# Patient Record
Sex: FEMALE | Race: WHITE | NOT HISPANIC OR LATINO | Employment: OTHER | URBAN - METROPOLITAN AREA
[De-identification: names, ages, dates, MRNs, and addresses within clinical notes are randomized per-mention and may not be internally consistent; named-entity substitution may affect disease eponyms.]

---

## 2023-03-14 LAB
BASOPHILS (10*3/UL) IN BLOOD BY AUTOMATED COUNT: 0.04 X10E9/L (ref 0–0.1)
BASOPHILS/100 LEUKOCYTES IN BLOOD BY AUTOMATED COUNT: 0.7 % (ref 0–2)
EOSINOPHILS (10*3/UL) IN BLOOD BY AUTOMATED COUNT: 0.36 X10E9/L (ref 0–0.7)
EOSINOPHILS/100 LEUKOCYTES IN BLOOD BY AUTOMATED COUNT: 5.9 % (ref 0–6)
ERYTHROCYTE DISTRIBUTION WIDTH (RATIO) BY AUTOMATED COUNT: 13.2 % (ref 11.5–14.5)
ERYTHROCYTE MEAN CORPUSCULAR HEMOGLOBIN CONCENTRATION (G/DL) BY AUTOMATED: 30.3 G/DL (ref 32–36)
ERYTHROCYTE MEAN CORPUSCULAR VOLUME (FL) BY AUTOMATED COUNT: 94 FL (ref 80–100)
ERYTHROCYTES (10*6/UL) IN BLOOD BY AUTOMATED COUNT: 4.37 X10E12/L (ref 4–5.2)
HEMATOCRIT (%) IN BLOOD BY AUTOMATED COUNT: 40.9 % (ref 36–46)
HEMOGLOBIN (G/DL) IN BLOOD: 12.4 G/DL (ref 12–16)
IGA (MG/DL) IN SER/PLAS: 66 MG/DL (ref 70–400)
IGG (MG/DL) IN SER/PLAS: 576 MG/DL (ref 700–1600)
IGM (MG/DL) IN SER/PLAS: <10 MG/DL (ref 40–230)
IMMATURE GRANULOCYTES/100 LEUKOCYTES IN BLOOD BY AUTOMATED COUNT: 0.2 % (ref 0–0.9)
LEUKOCYTES (10*3/UL) IN BLOOD BY AUTOMATED COUNT: 6.1 X10E9/L (ref 4.4–11.3)
LYMPHOCYTES (10*3/UL) IN BLOOD BY AUTOMATED COUNT: 1.56 X10E9/L (ref 1.2–4.8)
LYMPHOCYTES/100 LEUKOCYTES IN BLOOD BY AUTOMATED COUNT: 25.7 % (ref 13–44)
MONOCYTES (10*3/UL) IN BLOOD BY AUTOMATED COUNT: 0.7 X10E9/L (ref 0.1–1)
MONOCYTES/100 LEUKOCYTES IN BLOOD BY AUTOMATED COUNT: 11.5 % (ref 2–10)
NEUTROPHILS (10*3/UL) IN BLOOD BY AUTOMATED COUNT: 3.41 X10E9/L (ref 1.2–7.7)
NEUTROPHILS/100 LEUKOCYTES IN BLOOD BY AUTOMATED COUNT: 56 % (ref 40–80)
NRBC (PER 100 WBCS) BY AUTOMATED COUNT: 0 /100 WBC (ref 0–0)
PLATELETS (10*3/UL) IN BLOOD AUTOMATED COUNT: 371 X10E9/L (ref 150–450)

## 2023-03-15 LAB
CD19%: ABNORMAL % (ref 6–19)
CD45%: 100 %
FMETH: ABNORMAL
FSIT1: ABNORMAL
MARKER INTERPRETATION: ABNORMAL
PV191: NORMAL

## 2023-04-06 LAB
ALANINE AMINOTRANSFERASE (SGPT) (U/L) IN SER/PLAS: 9 U/L (ref 7–45)
ALBUMIN (G/DL) IN SER/PLAS: 3.9 G/DL (ref 3.4–5)
ALKALINE PHOSPHATASE (U/L) IN SER/PLAS: 51 U/L (ref 33–136)
ANION GAP IN SER/PLAS: 13 MMOL/L (ref 10–20)
ASPARTATE AMINOTRANSFERASE (SGOT) (U/L) IN SER/PLAS: 12 U/L (ref 9–39)
BILIRUBIN TOTAL (MG/DL) IN SER/PLAS: 0.5 MG/DL (ref 0–1.2)
CALCIUM (MG/DL) IN SER/PLAS: 9.9 MG/DL (ref 8.6–10.3)
CARBON DIOXIDE, TOTAL (MMOL/L) IN SER/PLAS: 27 MMOL/L (ref 21–32)
CHLORIDE (MMOL/L) IN SER/PLAS: 103 MMOL/L (ref 98–107)
CREATININE (MG/DL) IN SER/PLAS: 0.61 MG/DL (ref 0.5–1.05)
GFR FEMALE: >90 ML/MIN/1.73M2
GLUCOSE (MG/DL) IN SER/PLAS: 105 MG/DL (ref 74–99)
POTASSIUM (MMOL/L) IN SER/PLAS: 4.4 MMOL/L (ref 3.5–5.3)
PROTEIN TOTAL: 6.9 G/DL (ref 6.4–8.2)
SODIUM (MMOL/L) IN SER/PLAS: 139 MMOL/L (ref 136–145)
THYROTROPIN (MIU/L) IN SER/PLAS BY DETECTION LIMIT <= 0.05 MIU/L: 0.11 MIU/L (ref 0.44–3.98)
THYROXINE (T4) FREE (NG/DL) IN SER/PLAS: 1.75 NG/DL (ref 0.61–1.12)
UREA NITROGEN (MG/DL) IN SER/PLAS: 16 MG/DL (ref 6–23)

## 2023-06-19 LAB
ACTIVATED PARTIAL THROMBOPLASTIN TIME IN PPP BY COAGULATION ASSAY: 35 SEC (ref 26–39)
ANION GAP IN SER/PLAS: 14 MMOL/L (ref 10–20)
CALCIUM (MG/DL) IN SER/PLAS: 9.7 MG/DL (ref 8.6–10.3)
CARBON DIOXIDE, TOTAL (MMOL/L) IN SER/PLAS: 25 MMOL/L (ref 21–32)
CHLORIDE (MMOL/L) IN SER/PLAS: 103 MMOL/L (ref 98–107)
CREATININE (MG/DL) IN SER/PLAS: 0.64 MG/DL (ref 0.5–1.05)
ERYTHROCYTE DISTRIBUTION WIDTH (RATIO) BY AUTOMATED COUNT: 13.2 % (ref 11.5–14.5)
ERYTHROCYTE MEAN CORPUSCULAR HEMOGLOBIN CONCENTRATION (G/DL) BY AUTOMATED: 33 G/DL (ref 32–36)
ERYTHROCYTE MEAN CORPUSCULAR VOLUME (FL) BY AUTOMATED COUNT: 88 FL (ref 80–100)
ERYTHROCYTES (10*6/UL) IN BLOOD BY AUTOMATED COUNT: 4.83 X10E12/L (ref 4–5.2)
GFR FEMALE: >90 ML/MIN/1.73M2
GLUCOSE (MG/DL) IN SER/PLAS: 98 MG/DL (ref 74–99)
HEMATOCRIT (%) IN BLOOD BY AUTOMATED COUNT: 42.4 % (ref 36–46)
HEMOGLOBIN (G/DL) IN BLOOD: 14 G/DL (ref 12–16)
INR IN PPP BY COAGULATION ASSAY: 1.1 (ref 0.9–1.1)
LEUKOCYTES (10*3/UL) IN BLOOD BY AUTOMATED COUNT: 5.8 X10E9/L (ref 4.4–11.3)
PLATELETS (10*3/UL) IN BLOOD AUTOMATED COUNT: 289 X10E9/L (ref 150–450)
POTASSIUM (MMOL/L) IN SER/PLAS: 4.2 MMOL/L (ref 3.5–5.3)
PROTHROMBIN TIME (PT) IN PPP BY COAGULATION ASSAY: 12.2 SEC (ref 9.8–13.4)
SODIUM (MMOL/L) IN SER/PLAS: 138 MMOL/L (ref 136–145)
THYROTROPIN (MIU/L) IN SER/PLAS BY DETECTION LIMIT <= 0.05 MIU/L: 0.79 MIU/L (ref 0.44–3.98)
THYROXINE (T4) FREE (NG/DL) IN SER/PLAS: 1.36 NG/DL (ref 0.61–1.12)
UREA NITROGEN (MG/DL) IN SER/PLAS: 15 MG/DL (ref 6–23)

## 2023-06-20 LAB — URINE CULTURE: NORMAL

## 2023-06-28 ENCOUNTER — HOSPITAL ENCOUNTER (OUTPATIENT)
Dept: DATA CONVERSION | Facility: HOSPITAL | Age: 73
End: 2023-06-28
Attending: UROLOGY | Admitting: UROLOGY
Payer: MEDICARE

## 2023-06-28 DIAGNOSIS — Z79.82 LONG TERM (CURRENT) USE OF ASPIRIN: ICD-10-CM

## 2023-06-28 DIAGNOSIS — N32.9 BLADDER DISORDER, UNSPECIFIED: ICD-10-CM

## 2023-06-28 DIAGNOSIS — J45.909 UNSPECIFIED ASTHMA, UNCOMPLICATED (HHS-HCC): ICD-10-CM

## 2023-06-28 DIAGNOSIS — I10 ESSENTIAL (PRIMARY) HYPERTENSION: ICD-10-CM

## 2023-06-28 DIAGNOSIS — E03.9 HYPOTHYROIDISM, UNSPECIFIED: ICD-10-CM

## 2023-07-03 LAB
COMPLETE PATHOLOGY REPORT: NORMAL
CONVERTED CLINICAL DIAGNOSIS-HISTORY: NORMAL
CONVERTED FINAL DIAGNOSIS: NORMAL
CONVERTED FINAL REPORT PDF LINK TO COPY AND PASTE: NORMAL
CONVERTED GROSS DESCRIPTION: NORMAL

## 2023-08-19 PROBLEM — H04.123 BILATERAL DRY EYES: Status: ACTIVE | Noted: 2023-08-19

## 2023-08-19 PROBLEM — I10 BENIGN ESSENTIAL HYPERTENSION: Status: ACTIVE | Noted: 2023-08-19
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
## 2023-08-19 PROBLEM — H25.11 AGE-RELATED NUCLEAR CATARACT OF RIGHT EYE: Status: ACTIVE | Noted: 2023-08-19

## 2023-08-19 PROBLEM — R76.8 HEPATITIS B CORE ANTIBODY POSITIVE: Status: ACTIVE | Noted: 2023-08-19

## 2023-08-19 PROBLEM — B18.2 CHRONIC HEPATITIS C WITHOUT HEPATIC COMA (MULTI): Status: ACTIVE | Noted: 2023-08-19

## 2023-08-19 PROBLEM — H16.9 BILATERAL KERATITIS: Status: ACTIVE | Noted: 2023-08-19

## 2023-08-19 PROBLEM — G36.0 NEUROMYELITIS OPTICA (MULTI): Status: ACTIVE | Noted: 2023-08-19

## 2023-08-19 PROBLEM — M47.812 CERVICAL SPONDYLOSIS: Status: ACTIVE | Noted: 2023-08-19

## 2023-08-19 PROBLEM — Z86.010 HISTORY OF ADENOMATOUS POLYP OF COLON: Status: ACTIVE | Noted: 2023-08-19

## 2023-08-19 PROBLEM — Z86.0101 HISTORY OF ADENOMATOUS POLYP OF COLON: Status: ACTIVE | Noted: 2023-08-19

## 2023-08-19 PROBLEM — K63.5 COLON POLYP: Status: ACTIVE | Noted: 2023-08-19

## 2023-08-19 PROBLEM — H40.043 STEROID RESPONDER, BILATERAL: Status: ACTIVE | Noted: 2023-08-19

## 2023-08-19 PROBLEM — B02.9 SHINGLES: Status: ACTIVE | Noted: 2023-08-19

## 2023-08-19 PROBLEM — H53.411 CENTRAL SCOTOMA, RIGHT EYE: Status: ACTIVE | Noted: 2023-08-19

## 2023-08-19 PROBLEM — M54.9 PAIN, UPPER BACK: Status: ACTIVE | Noted: 2023-08-19

## 2023-08-19 PROBLEM — L30.9 DERMATITIS OF FACE: Status: ACTIVE | Noted: 2023-08-19

## 2023-08-19 PROBLEM — H53.19 PHOTOPSIA OF RIGHT EYE: Status: ACTIVE | Noted: 2023-08-19

## 2023-08-19 PROBLEM — H11.121 CONJUNCTIVAL CONCRETIONS OF RIGHT EYE: Status: ACTIVE | Noted: 2023-08-19

## 2023-08-19 PROBLEM — H40.059 ELEVATED IOP: Status: ACTIVE | Noted: 2023-08-19

## 2023-08-19 PROBLEM — H01.00B BLEPHARITIS OF BOTH UPPER AND LOWER EYELID OF LEFT EYE: Status: ACTIVE | Noted: 2023-08-19

## 2023-08-19 PROBLEM — E05.00 GRAVES DISEASE: Status: ACTIVE | Noted: 2023-08-19

## 2023-08-19 PROBLEM — H43.811 PVD (POSTERIOR VITREOUS DETACHMENT), RIGHT EYE: Status: ACTIVE | Noted: 2023-08-19

## 2023-08-19 PROBLEM — E03.9 HYPOTHYROIDISM: Status: ACTIVE | Noted: 2023-08-19

## 2023-08-19 PROBLEM — Z98.890 STATUS POST BILATERAL BREAST REDUCTION: Status: ACTIVE | Noted: 2023-08-19

## 2023-08-19 PROBLEM — Z86.19 HEPATITIS C VIRUS INFECTION CURED AFTER ANTIVIRAL DRUG THERAPY: Status: ACTIVE | Noted: 2023-08-19

## 2023-08-19 PROBLEM — M25.551 HIP PAIN, RIGHT: Status: ACTIVE | Noted: 2023-08-19

## 2023-08-19 PROBLEM — E55.9 VITAMIN D DEFICIENCY: Status: ACTIVE | Noted: 2023-08-19

## 2023-08-19 PROBLEM — Z79.620 ON RITUXIMAB THERAPY: Status: ACTIVE | Noted: 2023-08-19

## 2023-08-19 RX ORDER — ASPIRIN 325 MG
1 TABLET, DELAYED RELEASE (ENTERIC COATED) ORAL
COMMUNITY
End: 2024-04-22

## 2023-08-19 RX ORDER — NIRMATRELVIR AND RITONAVIR 300-100 MG
KIT ORAL
COMMUNITY
Start: 2022-12-26 | End: 2023-10-10 | Stop reason: ALTCHOICE

## 2023-08-19 RX ORDER — ARTIFICIAL TEARS 1; 2; 3 MG/ML; MG/ML; MG/ML
2 SOLUTION/ DROPS OPHTHALMIC EVERY 4 HOURS
COMMUNITY
Start: 2017-10-14

## 2023-08-19 RX ORDER — KETOCONAZOLE 20 MG/ML
SHAMPOO, SUSPENSION TOPICAL
COMMUNITY
Start: 2023-06-19

## 2023-08-19 RX ORDER — METHOCARBAMOL 500 MG/1
2 TABLET, FILM COATED ORAL 4 TIMES DAILY
COMMUNITY
End: 2023-10-10 | Stop reason: ALTCHOICE

## 2023-08-19 RX ORDER — LEVOTHYROXINE SODIUM 100 UG/1
TABLET ORAL
COMMUNITY
End: 2024-01-10

## 2023-08-19 RX ORDER — ONDANSETRON 4 MG/1
4 TABLET, FILM COATED ORAL DAILY
COMMUNITY
Start: 2023-03-02 | End: 2023-10-10 | Stop reason: ALTCHOICE

## 2023-08-19 RX ORDER — SULFAMETHOXAZOLE AND TRIMETHOPRIM 400; 80 MG/1; MG/1
1 TABLET ORAL EVERY OTHER DAY
COMMUNITY
Start: 2023-01-25 | End: 2023-10-10 | Stop reason: ALTCHOICE

## 2023-08-19 RX ORDER — CARBOXYMETHYLCELLULOSE SODIUM 10 MG/ML
2 GEL OPHTHALMIC 4 TIMES DAILY
COMMUNITY
Start: 2017-10-14 | End: 2024-01-12 | Stop reason: WASHOUT

## 2023-08-19 RX ORDER — ASPIRIN 325 MG
1 TABLET ORAL DAILY PRN
COMMUNITY

## 2023-08-19 RX ORDER — ENTECAVIR 0.5 MG/1
1 TABLET, FILM COATED ORAL
COMMUNITY
Start: 2017-12-20 | End: 2023-10-10 | Stop reason: SDUPTHER

## 2023-08-19 RX ORDER — FLUCONAZOLE 150 MG/1
1 TABLET ORAL DAILY
COMMUNITY
Start: 2023-02-02 | End: 2023-10-10 | Stop reason: ALTCHOICE

## 2023-08-19 RX ORDER — FLUTICASONE FUROATE, UMECLIDINIUM BROMIDE AND VILANTEROL TRIFENATATE 200; 62.5; 25 UG/1; UG/1; UG/1
1 POWDER RESPIRATORY (INHALATION) DAILY
COMMUNITY
Start: 2023-02-02 | End: 2023-10-10 | Stop reason: ALTCHOICE

## 2023-08-19 RX ORDER — BENZONATATE 100 MG/1
1 CAPSULE ORAL 3 TIMES DAILY
COMMUNITY
Start: 2023-03-02 | End: 2023-10-10 | Stop reason: ALTCHOICE

## 2023-08-19 RX ORDER — ALBUTEROL SULFATE 0.83 MG/ML
SOLUTION RESPIRATORY (INHALATION) 4 TIMES DAILY
COMMUNITY
Start: 2023-03-15 | End: 2024-02-09

## 2023-08-19 RX ORDER — VALSARTAN 40 MG/1
0.5 TABLET ORAL DAILY
COMMUNITY
End: 2023-11-03

## 2023-08-19 RX ORDER — PREDNISONE 10 MG/1
TABLET ORAL
COMMUNITY
Start: 2023-01-28 | End: 2023-10-10 | Stop reason: ALTCHOICE

## 2023-08-19 RX ORDER — ALBUTEROL SULFATE 90 UG/1
2 AEROSOL, METERED RESPIRATORY (INHALATION) EVERY 6 HOURS PRN
COMMUNITY

## 2023-09-13 DIAGNOSIS — Z79.899 DRUG-INDUCED IMMUNODEFICIENCY (MULTI): Primary | ICD-10-CM

## 2023-09-13 DIAGNOSIS — D84.821 DRUG-INDUCED IMMUNODEFICIENCY (MULTI): Primary | ICD-10-CM

## 2023-09-13 DIAGNOSIS — G36.0 NEUROMYELITIS OPTICA (MULTI): ICD-10-CM

## 2023-09-13 RX ORDER — DIPHENHYDRAMINE HYDROCHLORIDE 50 MG/ML
50 INJECTION INTRAMUSCULAR; INTRAVENOUS ONCE
Status: CANCELLED | OUTPATIENT
Start: 2023-10-06 | End: 2023-10-06

## 2023-09-13 RX ORDER — ACETAMINOPHEN 325 MG/1
650 TABLET ORAL ONCE
Status: CANCELLED | OUTPATIENT
Start: 2023-10-06 | End: 2023-10-06

## 2023-09-13 RX ORDER — DIPHENHYDRAMINE HYDROCHLORIDE 50 MG/ML
50 INJECTION INTRAMUSCULAR; INTRAVENOUS AS NEEDED
Status: CANCELLED | OUTPATIENT
Start: 2023-10-06

## 2023-09-13 RX ORDER — METHYLPREDNISOLONE SODIUM SUCCINATE 40 MG/ML
40 INJECTION INTRAMUSCULAR; INTRAVENOUS AS NEEDED
Status: CANCELLED | OUTPATIENT
Start: 2023-10-06

## 2023-09-13 RX ORDER — EPINEPHRINE 0.3 MG/.3ML
0.3 INJECTION SUBCUTANEOUS EVERY 5 MIN PRN
Status: CANCELLED | OUTPATIENT
Start: 2023-10-06

## 2023-09-13 RX ORDER — FAMOTIDINE 10 MG/ML
20 INJECTION INTRAVENOUS ONCE AS NEEDED
Status: CANCELLED | OUTPATIENT
Start: 2023-10-06

## 2023-09-13 RX ORDER — METHYLPREDNISOLONE SODIUM SUCCINATE 125 MG/2ML
100 INJECTION INTRAMUSCULAR; INTRAVENOUS ONCE
Status: CANCELLED | OUTPATIENT
Start: 2023-10-06 | End: 2023-10-06

## 2023-09-13 RX ORDER — ALBUTEROL SULFATE 0.83 MG/ML
3 SOLUTION RESPIRATORY (INHALATION) AS NEEDED
Status: CANCELLED | OUTPATIENT
Start: 2023-10-06

## 2023-09-13 NOTE — PROGRESS NOTES
Kristina Lundberg's disease modifying therapy (DMT) orders transcribed from [facility] into EPIC therapy plan for patient's upcoming dose.    Medication: Rituximab (Rituxan) 1000 mg IVPB once every 6 months    Prescriber: Reji Ryan MD    Infusion Location: [facility]    Last Infusion: 3/27/2023    Next Infusion Due:  9/24/2023    Next Infusion Scheduled: 10/6/2023    Insurance Status: Approved through 9/23/2024    Other: Patient had adverse side effects to Ruxience product. Rituxan product should be used.

## 2023-09-16 ENCOUNTER — HOSPITAL ENCOUNTER (OUTPATIENT)
Facility: HOSPITAL | Age: 73
Setting detail: OUTPATIENT SURGERY
End: 2023-09-16
Attending: UROLOGY | Admitting: UROLOGY
Payer: MEDICARE

## 2023-09-19 LAB
ALANINE AMINOTRANSFERASE (SGPT) (U/L) IN SER/PLAS: 10 U/L (ref 7–45)
ALBUMIN (G/DL) IN SER/PLAS: 4.5 G/DL (ref 3.4–5)
ALKALINE PHOSPHATASE (U/L) IN SER/PLAS: 78 U/L (ref 33–136)
ASPARTATE AMINOTRANSFERASE (SGOT) (U/L) IN SER/PLAS: 15 U/L (ref 9–39)
BASOPHILS (10*3/UL) IN BLOOD BY AUTOMATED COUNT: 0.04 X10E9/L (ref 0–0.1)
BASOPHILS/100 LEUKOCYTES IN BLOOD BY AUTOMATED COUNT: 0.8 % (ref 0–2)
BILIRUBIN DIRECT (MG/DL) IN SER/PLAS: 0.1 MG/DL (ref 0–0.3)
BILIRUBIN TOTAL (MG/DL) IN SER/PLAS: 0.4 MG/DL (ref 0–1.2)
EOSINOPHILS (10*3/UL) IN BLOOD BY AUTOMATED COUNT: 0.15 X10E9/L (ref 0–0.7)
EOSINOPHILS/100 LEUKOCYTES IN BLOOD BY AUTOMATED COUNT: 3 % (ref 0–6)
ERYTHROCYTE DISTRIBUTION WIDTH (RATIO) BY AUTOMATED COUNT: 14.2 % (ref 11.5–14.5)
ERYTHROCYTE MEAN CORPUSCULAR HEMOGLOBIN CONCENTRATION (G/DL) BY AUTOMATED: 32.8 G/DL (ref 32–36)
ERYTHROCYTE MEAN CORPUSCULAR VOLUME (FL) BY AUTOMATED COUNT: 92 FL (ref 80–100)
ERYTHROCYTES (10*6/UL) IN BLOOD BY AUTOMATED COUNT: 4.83 X10E12/L (ref 4–5.2)
HEMATOCRIT (%) IN BLOOD BY AUTOMATED COUNT: 44.2 % (ref 36–46)
HEMOGLOBIN (G/DL) IN BLOOD: 14.5 G/DL (ref 12–16)
IMMATURE GRANULOCYTES/100 LEUKOCYTES IN BLOOD BY AUTOMATED COUNT: 0.4 % (ref 0–0.9)
LEUKOCYTES (10*3/UL) IN BLOOD BY AUTOMATED COUNT: 5 X10E9/L (ref 4.4–11.3)
LYMPHOCYTES (10*3/UL) IN BLOOD BY AUTOMATED COUNT: 0.94 X10E9/L (ref 1.2–4.8)
LYMPHOCYTES/100 LEUKOCYTES IN BLOOD BY AUTOMATED COUNT: 18.8 % (ref 13–44)
MONOCYTES (10*3/UL) IN BLOOD BY AUTOMATED COUNT: 0.52 X10E9/L (ref 0.1–1)
MONOCYTES/100 LEUKOCYTES IN BLOOD BY AUTOMATED COUNT: 10.4 % (ref 2–10)
NEUTROPHILS (10*3/UL) IN BLOOD BY AUTOMATED COUNT: 3.32 X10E9/L (ref 1.2–7.7)
NEUTROPHILS/100 LEUKOCYTES IN BLOOD BY AUTOMATED COUNT: 66.6 % (ref 40–80)
PLATELETS (10*3/UL) IN BLOOD AUTOMATED COUNT: 252 X10E9/L (ref 150–450)
PROTEIN TOTAL: 7.1 G/DL (ref 6.4–8.2)
THYROTROPIN (MIU/L) IN SER/PLAS BY DETECTION LIMIT <= 0.05 MIU/L: 2.35 MIU/L (ref 0.44–3.98)
THYROXINE (T4) FREE (NG/DL) IN SER/PLAS: 0.98 NG/DL (ref 0.61–1.12)

## 2023-09-20 LAB
HEPATITIS B VIRUS SURFACE AB (MIU/ML) IN SERUM: 285.3 MIU/ML
HEPATITIS B VIRUS SURFACE AG PRESENCE IN SERUM: NONREACTIVE
IGA (MG/DL) IN SER/PLAS: 61 MG/DL (ref 70–400)
IGG (MG/DL) IN SER/PLAS: 732 MG/DL (ref 700–1600)
IGM (MG/DL) IN SER/PLAS: <10 MG/DL (ref 40–230)
PARATHYRIN INTACT (PG/ML) IN SER/PLAS: 50.2 PG/ML (ref 18.5–88)
TRIIODOTHYRONINE (T3) FREE (PG/ML) IN SER/PLAS: 2.9 PG/ML (ref 2.3–4.2)

## 2023-09-21 LAB
HBV DNA PCR COMMENT: NORMAL
HBV DNA QUANT PCR IU/ML: NOT DETECTED IU/ML
HBV DNA QUANT PCR LOG: NORMAL LOG IU/ML

## 2023-09-30 NOTE — H&P
History & Physical Reviewed:   I have reviewed the History and Physical dated:  08-Jun-2023   History and Physical reviewed and relevant findings noted. Patient examined to review pertinent physical  findings.: No significant changes   Home Medications Reviewed: no changes noted   Allergies Reviewed: no changes noted       ERAS (Enhanced Recovery After Surgery):  ·  ERAS Patient: no     Consent:   COVID-19 Consent:  ·  COVID-19 Risk Consent Surgeon has reviewed key risks related to the risk of todd COVID-19 and if they contract COVID-19 what the risks are.       Electronic Signatures:  Ward Wells)  (Signed 28-Jun-2023 11:47)   Authored: History & Physical Reviewed, ERAS, Consent,  Note Completion      Last Updated: 28-Jun-2023 11:47 by Ward Wells)

## 2023-10-02 ENCOUNTER — OFFICE VISIT (OUTPATIENT)
Dept: NEUROLOGY | Facility: HOSPITAL | Age: 73
End: 2023-10-02
Payer: MEDICARE

## 2023-10-02 ENCOUNTER — DOCUMENTATION (OUTPATIENT)
Dept: RESEARCH | Age: 73
End: 2023-10-02

## 2023-10-02 VITALS
BODY MASS INDEX: 31.89 KG/M2 | DIASTOLIC BLOOD PRESSURE: 81 MMHG | WEIGHT: 180 LBS | HEIGHT: 63 IN | SYSTOLIC BLOOD PRESSURE: 131 MMHG | RESPIRATION RATE: 18 BRPM | TEMPERATURE: 96.6 F | HEART RATE: 93 BPM

## 2023-10-02 DIAGNOSIS — G36.0 NEUROMYELITIS OPTICA (MULTI): Primary | ICD-10-CM

## 2023-10-02 DIAGNOSIS — Z79.899 DRUG-INDUCED IMMUNODEFICIENCY (MULTI): ICD-10-CM

## 2023-10-02 DIAGNOSIS — D84.821 DRUG-INDUCED IMMUNODEFICIENCY (MULTI): ICD-10-CM

## 2023-10-02 PROCEDURE — 1159F MED LIST DOCD IN RCRD: CPT | Performed by: PSYCHIATRY & NEUROLOGY

## 2023-10-02 PROCEDURE — 3079F DIAST BP 80-89 MM HG: CPT | Performed by: PSYCHIATRY & NEUROLOGY

## 2023-10-02 PROCEDURE — 99215 OFFICE O/P EST HI 40 MIN: CPT | Performed by: PSYCHIATRY & NEUROLOGY

## 2023-10-02 PROCEDURE — 1125F AMNT PAIN NOTED PAIN PRSNT: CPT | Performed by: PSYCHIATRY & NEUROLOGY

## 2023-10-02 PROCEDURE — 1036F TOBACCO NON-USER: CPT | Performed by: PSYCHIATRY & NEUROLOGY

## 2023-10-02 PROCEDURE — 3075F SYST BP GE 130 - 139MM HG: CPT | Performed by: PSYCHIATRY & NEUROLOGY

## 2023-10-02 RX ORDER — BUPROPION HYDROCHLORIDE 75 MG/1
75 TABLET ORAL DAILY
Qty: 30 TABLET | Refills: 11 | Status: SHIPPED | OUTPATIENT
Start: 2023-10-02 | End: 2024-10-01

## 2023-10-02 ASSESSMENT — PAIN SCALES - GENERAL: PAINLEVEL: 5

## 2023-10-02 NOTE — PATIENT INSTRUCTIONS
Your immunoglobulin level improved and is now in the normal range, which is great. This is likely because of the reduced rituximab dose. Luckily, your B cells remained fully depleted with that dose.     You only have mild reduction of lymphocyte count, which is not clinically significant.     Please continue the same dose of rituximab and vitamin D.     Please take wellubtrin 75 mg daily for fatigue.     I will refer you to dermatology for hives.     Follow up after six months.     Thank you very much for coming to see me today.     Reji Ryan MD

## 2023-10-02 NOTE — PROGRESS NOTES
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)   · Pneumonitis (486) (J18.9)   · COVID-19 (079.89) (U07.1)     Orders  COVID-19, Neuromyelitis optica, Pneumonitis    · Pulmonary Medicine Referral Evaluation and Treatment  Evaluate & Treat  Status: Hold For  - Scheduling  Requested for: 14Mar2023  Neuromyelitis optica    · B Cell Phenotyping, Extended; Status:Active; Requested for:14Mar2023;    · Complete Blood Count + Differential; Status:Active; Requested for:14Mar2023;    · Immunoglobulins (G,A,M); Status:Active; Requested for:14Mar2023;      Provider Impressions  Assessment:  This is a 67 year old right handed White female patient, Jahova's witness, with PMH significant for 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 improved partially with PLEX. The initial neurological exam was positive for central scotoma and red desaturation in the right eye. I have personally reviewed the MRIs which showed a longitudinally-extensive enhancing lesion in the posterior right optic nerve with chiasmal involvement without demyelinating lesions in the brain. cervical MRI showed a non-enhancing short segment demyelinating plaque at C5 unchanged from the initial spine scan from 2006. CSF showed increased lymphocytes, protein, and IGG index but negative OCBs. OCT/VEP consistent with right optic neuritis. She tested negative for Aqp4 (serum and CSF) and anti MOG antibodies. 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 After consulting with hepatology, she was started on rituximab in January 2018 along with treatment for Hep B and Hep C. She's doing well apart from some fluctuation of residual neurological symptoms.      11/14/2018: stable. some mild spasticity in the legs and new headaches. No relapses. tolerating  rituximab well.  06/05/2019: stable. mild fluctuation of residual symptoms. Right hip pain so we need to r/o avascular necrosis of the right hip.   12/04/2019: some nighttime headache and diffuse body pains. Will check ESR and CRP. no new relapses.   06/03/2020: Worsening fatigue and mood but declined wellbutrin, amantadine, and sleep study. Says she won't want to take the SARS-COV-2 vaccine when it becomes available. Will check Ig, ALC, and B cell phenotypes today.     11/19/2020: No new relapses. she was recently diagnosed with early breast cancer based on biopsy and is planed for lumpectomy with sentinel LN dissection on 12/2 followed by hormonal and radiotherapy. Her labs from June showed normal IGG level and ALC, and zero B cells. She continues to have morning occipital headaches that improve when she stands up and walks but not worsening than before and she has had those for years. I offered her a brain MRI to r/o brain or meningeal mets but she declined given stability and chronicity of her headache which is reasonable. The new diagnosis of breast cancer raises a question about the possibility of paraneoplastic NMOSD so I will send the serum autoimmune encephalopathy panel on her especially to look for anti-CV2. This also raises a question about the relation to (and safety of) rituximab. Studies of rituximab therapy in RA and MS showed no increased risk of breast or other cancers but MS studies with the related ocrelizumab (humanized rituximab) showed some increase in breast cancer cases in patients with PPMS. I will check with her breast oncologist regarding their view of rituximab. The patient feels strongly towards staying on rituximab and is more worried about getting an NMOSD attack if she is to come off of it. Of course, if her CV2 antibody comes back positive then we are clearly dealing with a paraneoplastic NMOSD picture and in that case cancer treatment may lead to remission and there will be no need  for long-term therapy with rituximab any more.      02/04/2021: No new relapses. she underwent partial mastectomy in December and is planned for radiation and hormonal therapy soon. Her ENS1 panel came back negative including negative CV2 antibody. Her ALC and IgG levels remain normal. Her breast oncologist had no objection to rituximab. she was wondering if she should proceed with radiation therapy and covid vaccination. I explained that she should proceed with radiation and hormonal therapy as recommended by the oncologist as these do not have any known implications on her NMOSD or rituximab therapy. I discussed COVID19 vaccine implications.      08/09/2021: After radiation for breast cancer, she started having multiple symptoms including blurring of vision in the right eye, right eye pain, increased headaches, dizziness (leading one time to a fall and inability to recover), and worsening body spasms and bladder symptoms. she is concerned that radiation might have triggered an NMOSD relapse. Her last rituximab dose was in January of 2021 and she competed her second dose of the pfizer covid vaccine exactly four weeks ago. She is scheduled for her next rituximab infusion this Friday. OCT shows stable RNFL thickness OD compared to the last value documented by Dr. Jaime (68 compared to 66). Unlikely to be having optic neuritis but will get a brain MRi to evaluate her dizzy episode given her hx of breast cancer and NMOSD. will check spike antibody.      02/07/2022: MRI brain was unremarkable. No new symptoms but has ongoing fatigue. still thinking about going on hormonal therapy for her breast cancer as recommended by her oncologist. She is worried about her IgG trending down althought it is still in the normal range but because she cannot take blood products including IVIG if she is to develop hypogammglobulinemia, she is wondering if we should consider half dose or extended interval between rituximab doses. all are  reasonable options for her but only if she develops recurrent infection or critical IgG levels. For now will keep the same regimen. she is interested in joining the new NMOSD registry. She received her third dose of the Pfizer COVID vaccine and wants to check the antibody level again to decide whether or not she'll take the booster (fourth dose for her).      09/13/2022: last blood work showed decrease of IGG to below the lower limit of normal range (680). she also had a prolonged shingles infection in the her left breast and back area. this happened in June four months after her rituximab dose requiring prolonged valtrex course. This has since cleared but she is now interested in decreasing rituximab dose, which is reasonable. reports new left lateral thigh numbness (likely meralgia paraesthetica). wants hilaryeld, which is also reasonable.      03/14/2023: she was admitted to the hospital in January with complicated COVID19 requiring oxygen therapy. she was found to have COVID19 PNA and was also found to have pneumonitis thought to be related to her radiation therapy or rituximab. she is much better now but is thought to have long COVID19 with persistent cough. Her repeat Ig level in February went down to 440 but her ALC was normal at 2.2. We held her last rituximab dose in February (originally planned to be half dose). she reports some worsening of pre-existing visual and sensory symptoms during all this especially when taking hot showers. will repeat Ig level and if above 500, will resume rituximab at half dose. If still low, we will have to consider IVIG replacement if she allows it from the Shinto standpoint. she enrolled in [employer].    10/2/2023: IgG levels improved to normal after decreasing rituximab dose without early B cell repletion. No more serious infections (only minor cold). Complains of fatigue and spontaneous hives. Had many other questions that I answered to the best of my ability. Will continue  reduced dose rituximab and will add wellbutrin for fatigue. Will also refer her to dermatology.         Plan:  - Acute relapse management: not indicated      - DMT: continue rituximab at half dose.      - Will check CBC diff, IgG,and B cell phenotypes with each infusion.      - Symptomatic management: wellbutrin to address fatigue and mood.      - Will refer her to dermatology for evaluation     - Vitamin D: Continue 53436 units a week.      - Smoking: not smoking currently      - PT/OT: no needs      - Instructions: keep an active life style and develop exercise routine as tolerated. Recommend a balanced healthy diet. Avoid excessive amounts of salt, carbs, fat, and red meat. Increase intake of fruits, vegetables, and white meat.      - Follow up: in 6 months    Reji Ryan MD       10/2/2023                      Chief Complaint  Double seronegative NMOSD.      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 Jaime right RNFL thinning   CSF: done in 10/2017: W 14, P 162, IGG index high, OCBs negative.   JCV: positive in 11/2017, index 3.35  VZV: positive in 11/2017  HEP panel: Hep C PCR positive, Hep B core antibody positive , Hep B S antibody positive, Hep B surface antigen negative (concurrent hep C and B treatment with rituximab).   NMO: negative in serum and CSF 10/2017 and again negative in 2018  MOG: negative 10/2017 and again negative in 2018        This is a 67 year old right handed White female patient, Jahova's witness, with PMH significant for left eye blindness 2/2 congenital cataract,  transverse myelitis (3/2006), untreated HepC, HTN, Grave's dx/hypothyroidism, and obesity, who presents for follow up regarding double seronegative NMOSD.      Interval hx:  IgG levels improved to normal after decreasing rituximab dose without early B cell repletion. No more serious infections (only minor cold). Complains of fatigue and spontaneous hives.      NMO symptom review:     weakness: yes in 2006, resolved   sensory changes: yes in 2006, persistent in the arms  incoordination: no  falling: no  gait change: yes in 2006, resolved   painful vision loss: yes right posterior optic neuritis in 10/2017. Also reports several episodes of pain and changed light perception in her congenitally blind left eye since 2006.   double vision: no  vertigo: not currently   facial/bulbar weakness: never   Lhermitte's: yes since 2006  bladder/bowel dysfunction: mild urinary urgency since 2006     spasticity: yes in the legs   tonic spasms: yes rare extensor spasms of the RLE  tremors: no  RLS: not asked   dystonia: yes paroxysmal in the right hand.   other movement disorders: no     heat sensitivity: yes severe  fatigue: yes and excessive daytime sleepiness.   depression: no  anxiety: yes  cognitive changes: no     DMT: rituximab since 1/2018  medication side effects: fatigue  last blood work: 4/30/2018     Vitamin D: taking 22930 units weekly   symptomatic meds: has baclofen for spasms but she rarely takes it.                  Review of Systems  All systems reviewed and are negative except as mentioned in the HPI.        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)   · Cervical spondylosis (721.0) (M47.812)   · Chronic hepatitis C without hepatic coma (070.54) (B18.2)   · Class 1 obesity with body mass index (BMI) of 31.0 to 31.9 in adult (278.00,V85.31)  (E66.9,Z68.31)   · Colon polyp (211.3) (K63.5)   · COVID-19 (079.89) (U07.1)   · Elevated IOP (365.00) (H40.059)   · Exposure to medical therapeutic radiation (E873.3) (Y63.3)   · Former smoker (V15.82) (Z87.891)   · Graves disease (242.00) (E05.00)   · Hepatitis B core antibody positive (795.79) (R76.8)   · Hepatitis C virus infection cured after antiviral drug therapy (V12.09) (Z86.19)   · Hip pain, right (719.45) (M25.551)   · History of adenomatous polyp of colon (V12.72) (Z86.010)   · Hypothyroidism (244.9) (E03.9)   · Low back pain (724.2) (M54.50)   · Malignant neoplasm of central portion of left breast in female, estrogen receptor positive  (174.1,V86.0) (C50.112,Z17.0)   · Nausea in adult (787.02) (R11.0)   · 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)   · On rituximab therapy (V58.69) (Z79.620)   · Optic neuritis, right (377.30) (H46.9)   · Other visual distortions and entoptic phenomena (368.15) (H53.19)   · Pain of left lower extremity (729.5) (M79.605)   · Pain, upper back (724.5) (M54.9)   · Photopsia of right eye (368.15) (H53.19)   · PVD (posterior vitreous detachment), right eye (379.21) (H43.811)   · Shingles (053.9) (B02.9)   · Status post bilateral breast reduction (V45.89) (Z98.890)   · Steroid responder, bilateral (365.03) (H40.043)   · Thyroid nodule (241.0) (E04.1)   · Transverse myelitis (323.82) (G37.3)   · Vitamin D deficiency (268.9) (E55.9)   · Vitreous degeneration of right eye (379.21) (H43.811)     Past Medical History  Problems    · History of Abdominal pain (789.00) (R10.9)   · History of Abdominal pain (789.00) (R10.9)   · History of Abdominal pain (789.00) (R10.9)   · History of Abnormal finding on breast imaging (793.89) (R92.8)   · Resolved Date:  21 Mar 2022   · History of Abnormal liver function test (790.6) (R79.89)   · History of Abnormal liver function test (790.6) (R79.89)   · Resolved Date: 21 Mar 2022   · Age-related nuclear cataract of right eye (366.16) (H25.11)   · History of Allergic conjunctivitis of both eyes (372.14) (H10.13)   · History of Cervical spondylosis (721.0) (M47.812)   · History of Cervical spondylosis (721.0) (M47.812)   · Resolved Date: 11 Nov 2021   · History of Chronic hepatitis C without hepatic coma (070.54) (B18.2)   · History of Chronic hepatitis C without hepatic coma (070.54) (B18.2)   · History of Chronic hepatitis C without hepatic coma (070.54) (B18.2)   · History of Chronic hepatitis C without hepatic coma (070.54) (B18.2)   · History of Congenital cataract (743.30) (Q12.0)   · Removed age 5 - Legally blind left eye - eye exam scheduled   · History of Eye pain (379.91) (H57.10)   · Resolved Date: 03 Jul 2014   · History of Hepatitis B core antibody positive (795.79) (R76.8)   · History of Hepatitis B core antibody positive (795.79) (R76.8)   · History of Hepatitis B core antibody positive (795.79) (R76.8)   · History of Hepatitis B core antibody positive (795.79) (R76.8)   · History of abdominal pain (V13.89) (Z87.898)   · Resolved Date: 11 Nov 2021   · History of acute conjunctivitis (V12.49) (Z86.69)   · History of acute conjunctivitis (V12.49) (Z86.69)   · Resolved Date: 11 Nov 2021   · History of cataract (V12.49) (Z86.69)   · Resolved Date: 10 Mar 2014   · h/o congenital cataract left eye, removed age 5. 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)   · Optic neuritis, right (377.30) (H46.9)   · Photopsia of right eye (368.15) (H53.19)   · History of Pupillary abnormality of left eye (364.75) (H21.562)   · History of Relative afferent pupillary defect of right eye (379.49) (H57.09)   · History of Rheumatoid arthritis (714.0) (M06.9)   · History of Transverse myelitis (323.82) (G37.3)   · Resolved Date: 20 Nov 2017   · 4/06 - (C3-4, C4-5)   · History of Vitamin D insufficiency (268.9) (E55.9)   · Resolved Date: 11 Nov 2021     Surgical History  Problems    · History of Ankle Arthrodesis Left   · Triple arthrodesis of the left ankle   · History of Bunion Correction By Garcia Procedure   · Bunionectomy of the left foot   · History of Cataract Surgery   · History of Complete Colonoscopy   · 2/07: 5yr f/u - Dr. Cody   · History of Dilation And Curettage   · History of Endometrial Biopsy By Suction   · 1/07 Dr. Haq   · History of Exploratory Laparoscopy   · History of Knee Replacement   · Left Knee replaced      Right knee replaced 2000   · History of Mastectomy partial   · History of Rotator Cuff Repair   · Right rotator cuff repair 6/11   · History of Total Knee Arthroplasty     Family History  Mother    · Family history of Breast Cancer (V16.3)  Father    · Family history of Acute Myocardial Infarction (V17.3)   · Family history of cardiac disorder (V17.49) (Z82.49)  Maternal Grandmother    · Family history of diabetes mellitus (V18.0) (Z83.3)   · Family history of malignant neoplasm (V16.9) (Z80.9)   · Family history of Type 2 diabetes mellitus with other circulatory complication     Social History  Problems    · Caffeine use (V49.89) (Z78.9)   · 2 cups daily   · Former smoker (V15.82) (Z87.891)   · No illicit drug use   · Spiritism Affiliation Latter day   · Social alcohol use (V49.89) (Z78.9)   ·  (V61.07) (Z63.4)     Allergies  Medication    · Motrin SUSP   Adverse Reaction; Lips and Face  swelling; Swelling; Recorded By: Manny Russell; 12/7/2012 8:11:46 AM   · ibuprofen   Recorded By: Avani Alaniz; 3/26/2021 2:16:50 PM   · Lisinopril TABS   Recorded By: Cate Fuller; 7/3/2014 2:54:07 PM   · Lyrica CAPS   Recorded By: Cate Fuller; 7/3/2014 2:54:07 PM  Denied    · Losartan Potassium TABS   Updated By: Cate Fuller; 8/28/2014 12:40:37 PM

## 2023-10-02 NOTE — OP NOTE
Post Operative Note:     PreOp Diagnosis: Bladder lesion   Post-Procedure Diagnosis: Same   Procedure: 1.  Cystoscopy with bladder biopsy/fulguration   Surgeon: Ward Wells MD   Resident/Fellow/Other Assistant: None   Anesthesia: General   Estimated Blood Loss (mL): Minimal   Specimen: yes   Findings: Small erythematous area posterior wall     Operative Report Dictated:  Dictation: not applicable - note contains Operative  Report   Operative Report:    Indications: 72-year-old white female presented to my partner for pelvic organ prolapse.  CT scan incidentally noted asymmetry of the bladder wall and there was concern  for a possible malignancy.  I recommended cystoscopy and possible biopsy.  Follow-up CT urogram did demonstrate resolution of this area of abnormality.  I still recommended cystoscopy for completion of her evaluation.  Risk, benefits, and alternatives  were discussed with the patient.  Informed consent was obtained.    Procedure: Patient is brought to the operating room and placed in the supine position. General anesthesia is induced. Once she is adequately anesthetized, her legs are placed in the dorsal lithotomy position. Her genitalia prepped and draped in the usual  sterile fashion.  A 22 Maori cystoscope was passed atraumatically per the urethra.  The bladder was inspected with both a 30 and 70 degree lens.  The right and left ureteral orifice were identified and were effluxing clear urine.  The bladder was unremarkable  with the exception of a small erythematous area along the posterior wall.  This area was biopsied with a rigid biopsy forcep.  It was then fulgurated.  The specimen was sent to pathology.  The bladder was reinspected and no other abnormalities were identified.   The bladder was drained and the cystoscope was removed.  Patient was awakened and taken to the PACU in stable condition.      Electronic Signatures:  Ward Wells)  (Signed 28-Jun-2023 13:28)   Authored:  Post Operative Note, Note Completion      Last Updated: 28-Jun-2023 13:28 by Ward Wells)

## 2023-10-05 ENCOUNTER — SPECIALTY PHARMACY (OUTPATIENT)
Dept: PHARMACY | Facility: CLINIC | Age: 73
End: 2023-10-05

## 2023-10-06 ENCOUNTER — INFUSION (OUTPATIENT)
Dept: HEMATOLOGY/ONCOLOGY | Facility: CLINIC | Age: 73
End: 2023-10-06
Payer: MEDICARE

## 2023-10-06 VITALS
WEIGHT: 179.45 LBS | BODY MASS INDEX: 31.79 KG/M2 | OXYGEN SATURATION: 94 % | TEMPERATURE: 97 F | SYSTOLIC BLOOD PRESSURE: 133 MMHG | HEART RATE: 89 BPM | RESPIRATION RATE: 18 BRPM | DIASTOLIC BLOOD PRESSURE: 64 MMHG

## 2023-10-06 DIAGNOSIS — D84.821 DRUG-INDUCED IMMUNODEFICIENCY (MULTI): ICD-10-CM

## 2023-10-06 DIAGNOSIS — Z79.899 DRUG-INDUCED IMMUNODEFICIENCY (MULTI): ICD-10-CM

## 2023-10-06 DIAGNOSIS — G36.0 NEUROMYELITIS OPTICA (MULTI): ICD-10-CM

## 2023-10-06 PROCEDURE — 2500000004 HC RX 250 GENERAL PHARMACY W/ HCPCS (ALT 636 FOR OP/ED): Mod: JZ | Performed by: PSYCHIATRY & NEUROLOGY

## 2023-10-06 PROCEDURE — 2500000001 HC RX 250 WO HCPCS SELF ADMINISTERED DRUGS (ALT 637 FOR MEDICARE OP): Performed by: PSYCHIATRY & NEUROLOGY

## 2023-10-06 PROCEDURE — 96402 CHEMO HORMON ANTINEOPL SQ/IM: CPT

## 2023-10-06 RX ORDER — EPINEPHRINE 0.3 MG/.3ML
0.3 INJECTION SUBCUTANEOUS EVERY 5 MIN PRN
Status: DISCONTINUED | OUTPATIENT
Start: 2023-10-06 | End: 2023-10-06 | Stop reason: HOSPADM

## 2023-10-06 RX ORDER — DIPHENHYDRAMINE HYDROCHLORIDE 50 MG/ML
50 INJECTION INTRAMUSCULAR; INTRAVENOUS ONCE
Status: COMPLETED | OUTPATIENT
Start: 2023-10-06 | End: 2023-10-06

## 2023-10-06 RX ORDER — DIPHENHYDRAMINE HYDROCHLORIDE 50 MG/ML
50 INJECTION INTRAMUSCULAR; INTRAVENOUS AS NEEDED
Status: DISCONTINUED | OUTPATIENT
Start: 2023-10-06 | End: 2023-10-06 | Stop reason: HOSPADM

## 2023-10-06 RX ORDER — FAMOTIDINE 10 MG/ML
20 INJECTION INTRAVENOUS ONCE AS NEEDED
Status: CANCELLED | OUTPATIENT
Start: 2024-04-03

## 2023-10-06 RX ORDER — EPINEPHRINE 0.3 MG/.3ML
0.3 INJECTION SUBCUTANEOUS EVERY 5 MIN PRN
Status: CANCELLED | OUTPATIENT
Start: 2024-04-03

## 2023-10-06 RX ORDER — ALBUTEROL SULFATE 0.83 MG/ML
3 SOLUTION RESPIRATORY (INHALATION) AS NEEDED
Status: CANCELLED | OUTPATIENT
Start: 2024-04-03

## 2023-10-06 RX ORDER — ACETAMINOPHEN 325 MG/1
650 TABLET ORAL ONCE
Status: CANCELLED | OUTPATIENT
Start: 2024-04-03 | End: 2024-04-03

## 2023-10-06 RX ORDER — ALBUTEROL SULFATE 0.83 MG/ML
3 SOLUTION RESPIRATORY (INHALATION) AS NEEDED
Status: DISCONTINUED | OUTPATIENT
Start: 2023-10-06 | End: 2023-10-06 | Stop reason: HOSPADM

## 2023-10-06 RX ORDER — DIPHENHYDRAMINE HYDROCHLORIDE 50 MG/ML
50 INJECTION INTRAMUSCULAR; INTRAVENOUS AS NEEDED
Status: CANCELLED | OUTPATIENT
Start: 2024-04-03

## 2023-10-06 RX ORDER — FAMOTIDINE 10 MG/ML
20 INJECTION INTRAVENOUS ONCE AS NEEDED
Status: DISCONTINUED | OUTPATIENT
Start: 2023-10-06 | End: 2023-10-06 | Stop reason: HOSPADM

## 2023-10-06 RX ORDER — ACETAMINOPHEN 325 MG/1
650 TABLET ORAL ONCE
Status: COMPLETED | OUTPATIENT
Start: 2023-10-06 | End: 2023-10-06

## 2023-10-06 RX ORDER — DIPHENHYDRAMINE HYDROCHLORIDE 50 MG/ML
50 INJECTION INTRAMUSCULAR; INTRAVENOUS ONCE
Status: CANCELLED | OUTPATIENT
Start: 2024-04-03 | End: 2024-04-03

## 2023-10-06 RX ADMIN — RITUXIMAB 1000 MG: 10 INJECTION, SOLUTION INTRAVENOUS at 10:44

## 2023-10-06 RX ADMIN — METHYLPREDNISOLONE SODIUM SUCCINATE 100 MG: 125 INJECTION, POWDER, FOR SOLUTION INTRAMUSCULAR; INTRAVENOUS at 10:13

## 2023-10-06 RX ADMIN — DIPHENHYDRAMINE HYDROCHLORIDE 50 MG: 50 INJECTION, SOLUTION INTRAMUSCULAR; INTRAVENOUS at 10:05

## 2023-10-06 RX ADMIN — ACETAMINOPHEN 650 MG: 325 TABLET, FILM COATED ORAL at 10:01

## 2023-10-06 ASSESSMENT — PATIENT HEALTH QUESTIONNAIRE - PHQ9
1. LITTLE INTEREST OR PLEASURE IN DOING THINGS: NOT AT ALL
1. LITTLE INTEREST OR PLEASURE IN DOING THINGS: NOT AT ALL
5. POOR APPETITE OR OVEREATING: 0
8. MOVING OR SPEAKING SO SLOWLY THAT OTHER PEOPLE COULD HAVE NOTICED. OR THE OPPOSITE, BEING SO FIGETY OR RESTLESS THAT YOU HAVE BEEN MOVING AROUND A LOT MORE THAN USUAL: NOT AT ALL
5. POOR APPETITE OR OVEREATING: NOT AT ALL
SUM OF ALL RESPONSES TO PHQ9 QUESTIONS 1 AND 2: 0
6. FEELING BAD ABOUT YOURSELF - OR THAT YOU ARE A FAILURE OR HAVE LET YOURSELF OR YOUR FAMILY DOWN: 0
2. FEELING DOWN, DEPRESSED, IRRITABLE, OR HOPELESS: 0
7. TROUBLE CONCENTRATING ON THINGS, SUCH AS READING THE NEWSPAPER OR WATCHING TELEVISION: 0
SUM OF ALL RESPONSES TO PHQ QUESTIONS 1-9: 0
2. FEELING DOWN, DEPRESSED, IRRITABLE, OR HOPELESS: NOT AT ALL
1. LITTLE INTEREST OR PLEASURE IN DOING THINGS: 0
10. IF YOU CHECKED OFF ANY PROBLEMS, HOW DIFFICULT HAVE THESE PROBLEMS MADE IT FOR YOU TO DO YOUR WORK, TAKE CARE OF THINGS AT HOME, OR GET ALONG WITH OTHER PEOPLE: NOT DIFFICULT AT ALL
8. MOVING OR SPEAKING SO SLOWLY THAT OTHER PEOPLE COULD HAVE NOTICED. OR THE OPPOSITE, BEING SO FIGETY OR RESTLESS THAT YOU HAVE BEEN MOVING AROUND A LOT MORE THAN USUAL: 0
2. FEELING DOWN, DEPRESSED OR HOPELESS: NOT AT ALL
3. TROUBLE FALLING OR STAYING ASLEEP OR SLEEPING TOO MUCH: NOT AT ALL
3. TROUBLE FALLING OR STAYING ASLEEP OR SLEEPING TOO MUCH: NOT AT ALL
10. IF YOU CHECKED OFF ANY PROBLEMS, HOW DIFFICULT HAVE THESE PROBLEMS MADE IT FOR YOU TO DO YOUR WORK, TAKE CARE OF THINGS AT HOME, OR GET ALONG WITH OTHER PEOPLE: NOT DIFFICULT AT ALL
4. FEELING TIRED OR HAVING LITTLE ENERGY: NOT AT ALL
8. MOVING OR SPEAKING SO SLOWLY THAT OTHER PEOPLE COULD HAVE NOTICED. OR THE OPPOSITE, BEING SO FIGETY OR RESTLESS THAT YOU HAVE BEEN MOVING AROUND A LOT MORE THAN USUAL: NOT AT ALL
9. THOUGHTS THAT YOU WOULD BE BETTER OFF DEAD, OR OF HURTING YOURSELF: NOT AT ALL
3. TROUBLE FALLING OR STAYING ASLEEP OR SLEEPING TOO MUCH: 0
9. THOUGHTS THAT YOU WOULD BE BETTER OFF DEAD, OR OF HURTING YOURSELF: NOT AT ALL
5. POOR APPETITE OR OVEREATING: NOT AT ALL
7. TROUBLE CONCENTRATING ON THINGS, SUCH AS READING THE NEWSPAPER OR WATCHING TELEVISION: NOT AT ALL
SUM OF ALL RESPONSES TO PHQ QUESTIONS 1-9: 0
2. FEELING DOWN, DEPRESSED OR HOPELESS: NOT AT ALL
9. THOUGHTS THAT YOU WOULD BE BETTER OFF DEAD, OR OF HURTING YOURSELF: 0
6. FEELING BAD ABOUT YOURSELF - OR THAT YOU ARE A FAILURE OR HAVE LET YOURSELF OR YOUR FAMILY DOWN: NOT AT ALL
4. FEELING TIRED OR HAVING LITTLE ENERGY: 0
1. LITTLE INTEREST OR PLEASURE IN DOING THINGS: NOT AT ALL
7. TROUBLE CONCENTRATING ON THINGS, SUCH AS READING THE NEWSPAPER OR WATCHING TELEVISION: NOT AT ALL
4. FEELING TIRED OR HAVING LITTLE ENERGY: NOT AT ALL
6. FEELING BAD ABOUT YOURSELF - OR THAT YOU ARE A FAILURE OR HAVE LET YOURSELF OR YOUR FAMILY DOWN: NOT AT ALL

## 2023-10-06 ASSESSMENT — PAIN SCALES - GENERAL: PAINLEVEL: 0-NO PAIN

## 2023-10-06 ASSESSMENT — COLUMBIA-SUICIDE SEVERITY RATING SCALE - C-SSRS
2. HAVE YOU ACTUALLY HAD ANY THOUGHTS OF KILLING YOURSELF?: NO
6. HAVE YOU EVER DONE ANYTHING, STARTED TO DO ANYTHING, OR PREPARED TO DO ANYTHING TO END YOUR LIFE?: NO

## 2023-10-06 NOTE — PROGRESS NOTES
1047 IV Rituxan hung & started at 17.5 ml/hr x30 min, datascope on, VSS, call light w/in reach, reviewed s/s reaction.  1120 IV Rituxan inf well w/out reaction noted, VSS, rate up to 35 ml/hr x30 min, pt dozing.  1150 IV Rituxan inf well w/out reaction noted, VSS, rate up to 52.5 ml/hr x30 min.  1220 IV Rituxan inf well w/out reaction noted, VSS, rate up to 70 ml/hr x30 min.  1250 IV Rituxan inf well w/out reaction noted, VSS, rate up to 87.5 ml/hr x30 min..  1320 IV Rituxan inf well w/out reaction noted, VSS, rate up to 105 ml/hr x30 min.  1350 IV Rituxan inf well w/out reaction noted, VSS, rate up to 122 ml/hr x30 min.  1420 IV Rituxan inf well w/out reaction noted, VSS, rate up to 140 ml/hr maximum rate.  1510 IV Rituxan infusion completed, NS flushing IV line, site clear, VSS.  1520 Pt disch amb, being picked up by her daughter.

## 2023-10-10 ENCOUNTER — OFFICE VISIT (OUTPATIENT)
Dept: DERMATOLOGY | Facility: CLINIC | Age: 73
End: 2023-10-10
Payer: MEDICARE

## 2023-10-10 DIAGNOSIS — L50.9 URTICARIA: Primary | ICD-10-CM

## 2023-10-10 PROCEDURE — 99213 OFFICE O/P EST LOW 20 MIN: CPT | Performed by: DERMATOLOGY

## 2023-10-10 PROCEDURE — 1036F TOBACCO NON-USER: CPT | Performed by: DERMATOLOGY

## 2023-10-10 PROCEDURE — 1126F AMNT PAIN NOTED NONE PRSNT: CPT | Performed by: DERMATOLOGY

## 2023-10-10 PROCEDURE — 1159F MED LIST DOCD IN RCRD: CPT | Performed by: DERMATOLOGY

## 2023-10-10 RX ORDER — TRIAMCINOLONE ACETONIDE 1 MG/G
CREAM TOPICAL
Qty: 454 G | Refills: 1 | Status: SHIPPED | OUTPATIENT
Start: 2023-10-10

## 2023-10-10 ASSESSMENT — DERMATOLOGY PATIENT ASSESSMENT
DO YOU HAVE ANY NEW OR CHANGING LESIONS: NO
ARE YOU AN ORGAN TRANSPLANT RECIPIENT: NO
FOR PATIENTS COMING IN FOR A FOLLOW-UP VISIT - HAVE THERE BEEN ANY CHANGES IN YOUR HEALTH SINCE YOUR LAST VISIT: NO
HAVE YOU HAD OR DO YOU HAVE A STAPH INFECTION: NO
DO YOU USE A TANNING BED: NO

## 2023-10-10 ASSESSMENT — DERMATOLOGY QUALITY OF LIFE (QOL) ASSESSMENT
ARE THERE EXCLUSIONS OR EXCEPTIONS FOR THE QUALITY OF LIFE ASSESSMENT: NO
RATE HOW BOTHERED YOU ARE BY EFFECTS OF YOUR SKIN PROBLEMS ON YOUR ACTIVITIES (EG, GOING OUT, ACCOMPLISHING WHAT YOU WANT, WORK ACTIVITIES OR YOUR RELATIONSHIPS WITH OTHERS): 2
RATE HOW BOTHERED YOU ARE BY SYMPTOMS OF YOUR SKIN PROBLEM (EG, ITCHING, STINGING BURNING, HURTING OR SKIN IRRITATION): 3
WHAT SINGLE SKIN CONDITION LISTED BELOW IS THE PATIENT ANSWERING THE QUALITY-OF-LIFE ASSESSMENT QUESTIONS ABOUT: URTICARIA
RATE HOW EMOTIONALLY BOTHERED YOU ARE BY YOUR SKIN PROBLEM (FOR EXAMPLE, WORRY, EMBARRASSMENT, FRUSTRATION): 2

## 2023-10-10 ASSESSMENT — PATIENT GLOBAL ASSESSMENT (PGA): PATIENT GLOBAL ASSESSMENT: PATIENT GLOBAL ASSESSMENT:  2 - MILD

## 2023-10-10 ASSESSMENT — ITCH NUMERIC RATING SCALE: HOW SEVERE IS YOUR ITCHING?: 10

## 2023-10-10 NOTE — PROGRESS NOTES
Subjective     Kristina Lundberg is a 72 y.o. female who presents for the following: Rash (Pt reports rash(hives) come and go, happens all over different areas of body. Happening for about 1 month. No specific time of day when flares. Has tried taking Zyrtec. Very itchy. Pt reports using new laundry soap. Used one time and then went back to old soap and rewashed laundry.//She is on Rituxan for Autoimmune neurologic disease - infusion was on Friday 10/6/23 and was given prophylactic meds of Benadryl and IV Solumedrol so still itching but not getting active areas currently.).     Review of Systems:  No other skin or systemic complaints other than what is documented elsewhere in the note.    The following portions of the chart were reviewed this encounter and updated as appropriate:          Skin Cancer History  No skin cancer on file.      Specialty Problems          Dermatology Problems    Dermatitis of face        Objective   Well appearing patient in no apparent distress; mood and affect are within normal limits.    A focused skin examination was performed- face, upper, lower extremities, breasts, buttocks. All findings within normal limits unless otherwise noted below.    Assessment/Plan   1. Urticaria  Edematous erythematous plaque with pallor    Hives are a histamine mediated process that can be acute (last < 6 weeks) and in some cases chronic (last > 6 weeks).    There are many causes of hives, however in chronic cases the most common cause is idiopathic.    Discussed treatment options including oral antihistamines, which unfortunately can may you feel tired or groggy. 2nd generation antihistamines such as Zyrtec or Claritin may be less likely to due so and can start taking either 10mg once daily.    Can also consider montelukast (Singulair).    For itching can start topical triamcinolone 0.1% cream and/or OTC Cerave Anti-itch creams or lotions. (Refridgerating creams can distract itch signal)    Triamcinolone  0.1% cream - Patient to apply 2x daily x 2 wks then decrease to 2x/day 2 days per week. Can repeat full 2 week course as often as every 6-8 weeks as needed for flaring. Side effects of topical steroids includes, but is not limited to skin atrophy and dyspigmentation.    Referral for allergy placed to consider prick or Nick testing, pt unsure if she would like to pursue.    No evidence of scabies to perform scabies prep today, reassured that despite hand itching there is no evidence of scabies or areas to due a mineral oil prep on today.      triamcinolone (Kenalog) 0.1 % cream  Apply to itchy areas (avoid face, skin folds) 2x daily for up to 2 weeks    Referral to Allergy        Follow up if worsening or for any other skin condition.

## 2023-10-12 ENCOUNTER — HOSPITAL ENCOUNTER (OUTPATIENT)
Dept: RADIOLOGY | Facility: HOSPITAL | Age: 73
Discharge: HOME | End: 2023-10-12
Payer: MEDICARE

## 2023-10-12 DIAGNOSIS — E04.1 NONTOXIC SINGLE THYROID NODULE: ICD-10-CM

## 2023-10-12 PROCEDURE — 76536 US EXAM OF HEAD AND NECK: CPT | Performed by: RADIOLOGY

## 2023-10-12 PROCEDURE — 76536 US EXAM OF HEAD AND NECK: CPT

## 2023-10-13 ENCOUNTER — TELEPHONE (OUTPATIENT)
Dept: PRIMARY CARE | Facility: CLINIC | Age: 73
End: 2023-10-13
Payer: MEDICARE

## 2023-10-13 NOTE — TELEPHONE ENCOUNTER
MD Faby Pierson MA  Ultrasound report reviewed.  1 nodule increased slightly, radiology recommending continued follow-up.  Recommend ultrasound 6 month    Informed pt

## 2023-10-17 NOTE — PROGRESS NOTES
Kristina Lundberg female   1950 72 y.o.   [id number]      Chief Complaint  Annual mammogram and exam, history of left DCIS    History Of Present Illness  Kristina Lundberg is a very pleasant 72 y.o.  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 2020 Megan Will and Manny Luis performed left breast bracketed Magseed partial mastectomy and bilateral oncoplastic reduction. Final pathology revealed 3.2 cm DCIS, margins widely negative. She completed whole breast post-operative radiation on 3/15/2021. She saw medical oncology for discussion of endocrine therapy, declined. She presents today for annual mammogram and exam. She denies any new masses or lumps. She does report intermittent left breast pain. She also reports she is considering a TH-BSO for prolapse.     BREAST IMAGING: 10/20/2022 Bilateral diagnostic mammogram, indicates BI-RADS Category 2.     REPRODUCTIVE HISTORY: menarche age 14 , first birth age 34,  x 3 years, OCP's x 8 months, natural menopause age 49, no HRT, scattered fibroglandular tissue    FAMILY CANCER HISTORY:   Mother: Breast cancer, age 43     Surgical History  She has a past surgical history that includes Dilation and curettage of uterus (2012); Laparoscopy diagnostic / biopsy / aspiration / lysis (2012); Total knee arthroplasty (2012); Other surgical history (2012); Other surgical history (2012); Rotator cuff repair (2012); Other surgical history (2012); Colonoscopy (2012); Total knee arthroplasty (2014); Cataract extraction (10/24/2017); IR CVC tunneled (10/25/2017); Breast lumpectomy (Left, 2020); Breast biopsy (Right, ); and Other surgical history (Bilateral, 2020).     Social History  She reports that she has never smoked. She has never used smokeless tobacco. She reports that she does not drink alcohol and does not use  drugs.    Family History  Family History   Problem Relation Name Age of Onset    Breast cancer Mother  43    Other (acute myocardial infarction) Father      Other (cardiac arrest) Father      Diabetes type II Maternal Grandfather      Other (malignant neoplasm) Maternal Grandfather          Allergies  Ibuprofen, Lisinopril, and Pregabalin    Medications  Current Outpatient Medications   Medication Instructions    albuterol 2.5 mg /3 mL (0.083 %) nebulizer solution nebulization, 4 times daily    albuterol 90 mcg/actuation inhaler 2 puffs, inhalation, Every 6 hours PRN    artificial tears, dextran-hypomel-glycerin, 0.1-0.3-0.2 % ophthalmic solution 2 drops, Left Eye, Every 4 hours    aspirin 325 mg tablet 1 tablet, oral, Daily PRN    buPROPion (WELLBUTRIN) 75 mg, oral, Daily    calcium carb/vitamin D3/vit K1 (VIACTIV ORAL) oral, Daily    carboxymethylcellulose (Refresh Celluvisc) 1 % ophthalmic solution dropperette 2 drops, Left Eye, 4 times daily    cholecalciferol (Vitamin D-3) 1,250 mcg (50,000 unit) capsule 1 capsule, oral, Weekly    entecavir (Baraclude) 0.5 mg tablet TAKE ONE (1) TABLET BY MOUTH DAILY 1 OR 2 HOURS BEFORE A MEAL.    ketoconazole (NIZOral) 2 % shampoo Topical, ply 1 Application daily topically. Lather on to scalp, let sit for 2-5 minutes. Rinse thoroughly. Use 2-3 times per week.<BR>    riTUXimab (RITUXAN) 1,000 mg, intravenous, Every 6 months    Synthroid 100 mcg tablet Take 1 Tablet for 6 Days A Week And 1 Day Off    triamcinolone (Kenalog) 0.1 % cream Apply to itchy areas (avoid face, skin folds) 2x daily for up to 2 weeks    valsartan (Diovan) 40 mg tablet oral, Daily         REVIEW OF SYSTEMS    Constitutional:  Negative for appetite change, fatigue, fever and unexpected weight change.   HENT:  Negative for ear pain, hearing loss, nosebleeds, sore throat and trouble swallowing.    Eyes:  Negative for discharge, itching and visual disturbance.   Respiratory:  Negative for cough, chest  tightness and shortness of breath.    Cardiovascular:  Negative for chest pain, palpitations and leg swelling.   Breast: as indicated in HPI  Gastrointestinal:  Negative for abdominal pain, constipation, diarrhea and nausea.   Endocrine: Negative for cold intolerance and heat intolerance.   Genitourinary:  Negative for dysuria, frequency, hematuria, pelvic pain and vaginal bleeding.   Musculoskeletal:  Negative for arthralgias, back pain, gait problem, joint swelling and myalgias.   Skin:  Negative for color change and rash.   Allergic/Immunologic: Negative for environmental allergies and food allergies.   Neurological:  Negative for dizziness, tremors, speech difficulty, weakness, numbness and headaches.   Hematological:  Does not bruise/bleed easily.   Psychiatric/Behavioral:  Negative for agitation, dysphoric mood and sleep disturbance. The patient is not nervous/anxious.         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), Ocular pain, unspecified eye (03/10/2014), Other abnormal and inconclusive findings on diagnostic imaging of breast (11/03/2020), Other anomalies of pupillary function (06/21/2019), Other microscopic hematuria, Other specified abnormal findings of blood chemistry, Other specified abnormal findings of blood chemistry (07/17/2018), Other specified abnormal immunological findings in serum (02/23/2018), Other specified abnormal immunological findings in serum (04/20/2018), Other  specified abnormal immunological findings in serum (05/25/2018), Other specified abnormal immunological findings in serum (07/17/2018), Other subjective visual disturbances (12/20/2022), Personal history of irradiation (2021), Personal history of other diseases of the circulatory system, Personal history of other diseases of the nervous system and sense organs (05/09/2018), Personal history of other diseases of the nervous system and sense organs (12/20/2022), Personal history of other diseases of the nervous system and sense organs (02/22/2013), Personal history of other endocrine, nutritional and metabolic disease, Personal history of other infectious and parasitic diseases (11/13/2018), Personal history of other specified conditions, Personal history of other specified conditions (12/04/2019), Pupillary abnormality, left eye (10/03/2017), Rheumatoid arthritis, unspecified (CMS/[facility]), Spondylosis without myelopathy or radiculopathy, cervical region, Spondylosis without myelopathy or radiculopathy, cervical region (07/03/2014), Unspecified abdominal pain (04/20/2018), Unspecified abdominal pain (05/25/2018), Unspecified abdominal pain (07/17/2018), Unspecified optic neuritis (12/20/2022), and Vitamin D deficiency, unspecified (07/17/2018).     Physical Exam  Patient is alert and oriented x3 and in a relaxed and appropriate mood. Her gait is steady and hand grasps are equal. Sclera is clear. The breasts are nearly symmetrical. Both breasts have well-healed pedicle method incisions. The left breast has thickened tissue inferior lateral quadrant and palpable scar tissue. The left breast has a tissue divot at midline of inframammary incision with mild hyperpigmentation. The remaining tissue of both breasts is soft without palpable abnormalities, discrete nodules or masses. The skin and nipples appear normal. There is no cervical, supraclavicular or axillary lymphadenopathy. Heart rate and rhythm normal, S1 and S2  appreciated. The lungs are clear to auscultation bilaterally. Abdomen is soft and non-tender.       Physical Exam  Chest:              Last Recorded Vitals  Vitals:    10/24/23 1517   BP: 133/68   Pulse: 85       Relevant Results   Time was spent discussing digital images of the radiology testing with the patient. I explained the results in depth, along with suggested explanation for follow up recommendations based on the testing results. BI-RADS Category 2    Imaging    Narrative & Impression   Interpreted By:  Ondina Yost,   STUDY:  BI MAMMO BILATERAL DIAGNOSTIC TOMOSYNTHESIS;  10/24/2023 3:15 pm      ACCESSION NUMBER(S):  [id number]      ORDERING CLINICIAN:  ALCON TAVARES      INDICATION:  History of a left lumpectomy with radiation therapy. History of a  bilateral breast reduction. Right excisional benign biopsy. Family  history of breast cancer with her mother diagnosed at age 43.      COMPARISON:  10/20/2022, 10/15/2021, 10/01/2020      FINDINGS:  2D and tomosynthesis images were reviewed at 1 mm slice thickness.      Density:  There are areas of scattered fibroglandular tissue.      In the central lateral aspect of the left breast there are surgical  clips and scarring at the lumpectomy site. The left lumpectomy site  is stable. Bilateral postreduction changes are present. The  parenchymal pattern is stable. No suspicious masses or calcifications  are identified.      IMPRESSION:  No mammographic evidence of malignancy.      BI-RADS CATEGORY:      BI-RADS Category:  2 Benign.  Recommendation:  Routine Screening Mammogram in 1 Year.  Recommended Date:  1 Year.  Laterality:  Bilateral.     Assessment/Plan   Normal clinical exam and imaging, history of left breast DCIS, family history of breast cancer, declined endocrine therapy, scattered fibroglandular tissue    Plan: Return in one year for bilateral screening mammogram and office visit. Provided GYN referrals per patient request.     Patient  Discussion/Summary  Your clinical examination and imaging are normal. Please return in one year for bilateral screening mammogram and office visit or sooner if you have any problems or concerns.     You can see your health information, review clinical summaries from office visits & test results online when you follow your health with MY  Chart, a personal health record. To sign up go to [email or web address]. If you need assistance with signing up or trouble getting into your account call [facility] Patient Line 24/7 at [phone].    My office phone number is [phone] if you need to get in touch with me or have additional questions or concerns. Thank you for choosing [facility] and trusting me as your healthcare provider. I look forward to seeing you again at your next office visit. I am honored to be a provider on your health care team and I remain dedicated to helping you achieve your health goals.      Sarah Beth Dennis, APRN-CNP

## 2023-10-24 ENCOUNTER — HOSPITAL ENCOUNTER (OUTPATIENT)
Dept: RADIOLOGY | Facility: HOSPITAL | Age: 73
Discharge: HOME | End: 2023-10-24
Payer: MEDICARE

## 2023-10-24 ENCOUNTER — SPECIALTY PHARMACY (OUTPATIENT)
Dept: PHARMACY | Facility: CLINIC | Age: 73
End: 2023-10-24

## 2023-10-24 ENCOUNTER — OFFICE VISIT (OUTPATIENT)
Dept: SURGICAL ONCOLOGY | Facility: HOSPITAL | Age: 73
End: 2023-10-24
Payer: MEDICARE

## 2023-10-24 VITALS
WEIGHT: 178 LBS | BODY MASS INDEX: 31.54 KG/M2 | HEART RATE: 85 BPM | DIASTOLIC BLOOD PRESSURE: 68 MMHG | SYSTOLIC BLOOD PRESSURE: 133 MMHG | HEIGHT: 63 IN

## 2023-10-24 VITALS — WEIGHT: 178 LBS | HEIGHT: 63 IN | BODY MASS INDEX: 31.54 KG/M2

## 2023-10-24 DIAGNOSIS — C50.112 MALIGNANT NEOPLASM OF CENTRAL PORTION OF LEFT FEMALE BREAST (MULTI): ICD-10-CM

## 2023-10-24 DIAGNOSIS — Z85.3 ENCOUNTER FOR FOLLOW-UP SURVEILLANCE OF BREAST CANCER: Primary | ICD-10-CM

## 2023-10-24 DIAGNOSIS — Z17.0 ESTROGEN RECEPTOR POSITIVE STATUS (ER+): ICD-10-CM

## 2023-10-24 DIAGNOSIS — Z08 ENCOUNTER FOR FOLLOW-UP SURVEILLANCE OF BREAST CANCER: Primary | ICD-10-CM

## 2023-10-24 PROCEDURE — G0279 TOMOSYNTHESIS, MAMMO: HCPCS | Mod: BILATERAL PROCEDURE | Performed by: RADIOLOGY

## 2023-10-24 PROCEDURE — 99213 OFFICE O/P EST LOW 20 MIN: CPT | Performed by: NURSE PRACTITIONER

## 2023-10-24 PROCEDURE — 1159F MED LIST DOCD IN RCRD: CPT | Performed by: NURSE PRACTITIONER

## 2023-10-24 PROCEDURE — 1036F TOBACCO NON-USER: CPT | Performed by: NURSE PRACTITIONER

## 2023-10-24 PROCEDURE — 77062 BREAST TOMOSYNTHESIS BI: CPT | Mod: 50

## 2023-10-24 PROCEDURE — 1126F AMNT PAIN NOTED NONE PRSNT: CPT | Performed by: NURSE PRACTITIONER

## 2023-10-24 PROCEDURE — 3078F DIAST BP <80 MM HG: CPT | Performed by: NURSE PRACTITIONER

## 2023-10-24 PROCEDURE — 3075F SYST BP GE 130 - 139MM HG: CPT | Performed by: NURSE PRACTITIONER

## 2023-10-24 PROCEDURE — 77066 DX MAMMO INCL CAD BI: CPT | Mod: BILATERAL PROCEDURE | Performed by: RADIOLOGY

## 2023-10-24 NOTE — PATIENT INSTRUCTIONS
Your clinical examination and imaging are normal. Please return in one year for bilateral screening mammogram and office visit or sooner if you have any problems or concerns.     You can see your health information, review clinical summaries from office visits & test results online when you follow your health with MY  Chart, a personal health record. To sign up go to [email or web address]. If you need assistance with signing up or trouble getting into your account call [facility] Patient Line 24/7 at [phone].    My office phone number is [phone] if you need to get in touch with me or have additional questions or concerns. Thank you for choosing [facility] and trusting me as your healthcare provider. I look forward to seeing you again at your next office visit. I am honored to be a provider on your health care team and I remain dedicated to helping you achieve your health goals.

## 2023-10-25 ENCOUNTER — PHARMACY VISIT (OUTPATIENT)
Dept: PHARMACY | Facility: CLINIC | Age: 73
End: 2023-10-25
Payer: COMMERCIAL

## 2023-10-25 ENCOUNTER — OFFICE VISIT (OUTPATIENT)
Dept: PRIMARY CARE | Facility: CLINIC | Age: 73
End: 2023-10-25
Payer: MEDICARE

## 2023-10-25 VITALS
HEIGHT: 63 IN | SYSTOLIC BLOOD PRESSURE: 104 MMHG | WEIGHT: 179.6 LBS | OXYGEN SATURATION: 96 % | HEART RATE: 84 BPM | DIASTOLIC BLOOD PRESSURE: 66 MMHG | BODY MASS INDEX: 31.82 KG/M2

## 2023-10-25 DIAGNOSIS — H10.32 ACUTE BACTERIAL CONJUNCTIVITIS OF LEFT EYE: Primary | ICD-10-CM

## 2023-10-25 PROCEDURE — 1126F AMNT PAIN NOTED NONE PRSNT: CPT | Performed by: STUDENT IN AN ORGANIZED HEALTH CARE EDUCATION/TRAINING PROGRAM

## 2023-10-25 PROCEDURE — 1159F MED LIST DOCD IN RCRD: CPT | Performed by: STUDENT IN AN ORGANIZED HEALTH CARE EDUCATION/TRAINING PROGRAM

## 2023-10-25 PROCEDURE — 99213 OFFICE O/P EST LOW 20 MIN: CPT | Performed by: STUDENT IN AN ORGANIZED HEALTH CARE EDUCATION/TRAINING PROGRAM

## 2023-10-25 PROCEDURE — 3074F SYST BP LT 130 MM HG: CPT | Performed by: STUDENT IN AN ORGANIZED HEALTH CARE EDUCATION/TRAINING PROGRAM

## 2023-10-25 PROCEDURE — 3078F DIAST BP <80 MM HG: CPT | Performed by: STUDENT IN AN ORGANIZED HEALTH CARE EDUCATION/TRAINING PROGRAM

## 2023-10-25 PROCEDURE — RXMED WILLOW AMBULATORY MEDICATION CHARGE

## 2023-10-25 PROCEDURE — 1036F TOBACCO NON-USER: CPT | Performed by: STUDENT IN AN ORGANIZED HEALTH CARE EDUCATION/TRAINING PROGRAM

## 2023-10-25 RX ORDER — ERYTHROMYCIN 5 MG/G
OINTMENT OPHTHALMIC 4 TIMES DAILY
Qty: 3.5 G | Refills: 0 | Status: SHIPPED | OUTPATIENT
Start: 2023-10-25 | End: 2023-10-26 | Stop reason: SDUPTHER

## 2023-10-25 RX ORDER — AMOXICILLIN AND CLAVULANATE POTASSIUM 875; 125 MG/1; MG/1
875 TABLET, FILM COATED ORAL 2 TIMES DAILY
Qty: 20 TABLET | Refills: 0 | Status: SHIPPED | OUTPATIENT
Start: 2023-10-25 | End: 2023-11-04

## 2023-10-25 ASSESSMENT — ENCOUNTER SYMPTOMS
EYE PAIN: 0
APPETITE CHANGE: 0
MYALGIAS: 0
COUGH: 0
EYE DISCHARGE: 1
HEMATURIA: 0
PALPITATIONS: 0
POLYDIPSIA: 0
FEVER: 0
EYE ITCHING: 1
SORE THROAT: 0
HEADACHES: 0
ABDOMINAL PAIN: 0
VOMITING: 0
FREQUENCY: 0
DIARRHEA: 0
SHORTNESS OF BREATH: 0
DYSURIA: 0
WHEEZING: 0
FATIGUE: 0
HALLUCINATIONS: 0
NAUSEA: 0
CONSTIPATION: 0

## 2023-10-25 NOTE — PROGRESS NOTES
"Subjective   Patient ID: Kristina Lundberg is a 72 y.o. female who presents for Facial Swelling and Eye Drainage.    HPI   Patient here for evaluation of bilateral eye burning and irritation.  Patient has complex eye history for which she sees neuro-ophthalmology.  Recently saw them however symptoms started shortly after.  Also has some associated right ear discomfort.  A lot of pressure in her right ear.  Was told in the past she has eustachian tube dysfunction.  Patient is legally blind out of her left eye.  Has not noticed any acuity changes from either eyes.  Noticed some mild swelling especially over the left eye.  Denies any direct trauma to the area. Afebrile course.  Has used refresh eyedrops as well as eye washes which does not seem to be helping her symptoms.    Review of Systems   Constitutional:  Negative for appetite change, fatigue and fever.   HENT:  Positive for ear pain. Negative for congestion, ear discharge, hearing loss and sore throat.    Eyes:  Positive for discharge and itching. Negative for pain.   Respiratory:  Negative for cough, shortness of breath and wheezing.    Cardiovascular:  Negative for chest pain, palpitations and leg swelling.   Gastrointestinal:  Negative for abdominal pain, constipation, diarrhea, nausea and vomiting.   Endocrine: Negative for cold intolerance, heat intolerance and polydipsia.   Genitourinary:  Negative for dysuria, frequency and hematuria.   Musculoskeletal:  Negative for gait problem and myalgias.   Skin:  Negative for rash.   Neurological:  Negative for syncope and headaches.   Psychiatric/Behavioral:  Negative for hallucinations and suicidal ideas.        Objective   /66   Pulse 84   Ht 1.6 m (5' 3\")   Wt 81.5 kg (179 lb 9.6 oz)   SpO2 96%   BMI 31.81 kg/m²     Physical Exam  Constitutional:       Appearance: Normal appearance.   HENT:      Head: Normocephalic and atraumatic.      Right Ear: External ear normal.      Left Ear: External ear normal. "      Ears:      Comments: Bulging right TM with erythematous effusion in the right ear.  Left TM within the acceptable limits     Nose: Nose normal.      Mouth/Throat:      Mouth: Mucous membranes are moist.   Eyes:      Comments: Conjunctive a slightly erythematous bilaterally.  Seems to be some edema on the upper and lower lids especially on the left eye.   Cardiovascular:      Rate and Rhythm: Normal rate and regular rhythm.      Pulses: Normal pulses.      Heart sounds: Normal heart sounds.   Pulmonary:      Effort: Pulmonary effort is normal.      Breath sounds: Normal breath sounds.   Abdominal:      General: Abdomen is flat.      Palpations: Abdomen is soft.   Neurological:      General: No focal deficit present.      Mental Status: She is alert and oriented to person, place, and time.   Psychiatric:         Mood and Affect: Mood normal.         Behavior: Behavior normal.         Assessment/Plan   Start the patient on topical and oral antibiotics.  If not better advised patient to please see her ophthalmologist for further evaluation.

## 2023-10-26 ENCOUNTER — TELEPHONE (OUTPATIENT)
Dept: PRIMARY CARE | Facility: CLINIC | Age: 73
End: 2023-10-26
Payer: MEDICARE

## 2023-10-26 DIAGNOSIS — H10.32 ACUTE BACTERIAL CONJUNCTIVITIS OF LEFT EYE: ICD-10-CM

## 2023-10-26 RX ORDER — ERYTHROMYCIN 5 MG/G
OINTMENT OPHTHALMIC 4 TIMES DAILY
Qty: 7 G | Refills: 0 | Status: SHIPPED | OUTPATIENT
Start: 2023-10-26 | End: 2023-11-02

## 2023-10-27 ENCOUNTER — APPOINTMENT (OUTPATIENT)
Dept: PREADMISSION TESTING | Facility: HOSPITAL | Age: 73
End: 2023-10-27
Payer: MEDICARE

## 2023-10-29 DIAGNOSIS — H10.30 ACUTE BACTERIAL CONJUNCTIVITIS, UNSPECIFIED LATERALITY: Primary | ICD-10-CM

## 2023-10-29 RX ORDER — CIPROFLOXACIN HYDROCHLORIDE 3 MG/ML
1 SOLUTION/ DROPS OPHTHALMIC
Qty: 6 ML | Refills: 0 | Status: SHIPPED | OUTPATIENT
Start: 2023-10-29 | End: 2023-11-15 | Stop reason: ALTCHOICE

## 2023-10-31 ENCOUNTER — TELEMEDICINE (OUTPATIENT)
Dept: UROLOGY | Facility: CLINIC | Age: 73
End: 2023-10-31
Payer: MEDICARE

## 2023-10-31 DIAGNOSIS — N81.4 UTEROVAGINAL PROLAPSE: Primary | ICD-10-CM

## 2023-10-31 PROCEDURE — 99214 OFFICE O/P EST MOD 30 MIN: CPT | Performed by: UROLOGY

## 2023-10-31 NOTE — PROGRESS NOTES
ASSESSMENT AND PLAN:   1. Bilateral hydronephrosis   2. Pelvic organ prolapse    2a. POP-Q 6/8/23: Aa: +3. Ba: +3 C: -1.5 Gh: 4.5. Pb: 4.5 TVL: 8 Ap: -1.5. Bp: -1.5. D: -4.   3. CT 4/19/23 suspicious for right sided bladder mass - neg bx WITH MARY JO  4. Neuromyelitis opitica disease (NMO), with a history of transverse myelitis   4a. followed by Reji Ryan   4b. rituximab   5. Estrogen receptor positive left-sided breast cancer s/p resection  6. Graves disease   7. Chronic hep B on Entecavir   8. History of ovarian cysts   9. [place], has DPA  10. 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 Hx endometrial hyperplasia and underwent an ablation- Will order TVUS   12. low flow and high pressure bladder outlet obstruction on UDS 9/7/23    12a. ?secondary to prolapse, improved emptying with reduction of prolapse    PLAN:  As detailed in the history of present illness.  We discussed the surgical options for the management of uterovaginal prolapse.  We will revisit these during her follow-up with me in December.  Meanwhile she want to try a pessary fitting.  I did send a referral for her to see Beverly Peters for that.  In planning for surgery and because of her history of postmenopausal bleeding in the past we will do a transvaginal ultrasound and we will review that during our follow-up visit 2.  All her questions were answered..       Established    HISTORY OF PRESENT ILLNESS:   This is a telehealth call to review the patient's history and surgical options.  Patient was advised about her surgical options during the last visit.  Patient had multiple questions that were detailed in her message to me.  We went over her questions today.  She she was concerned about the namely we discussed the risk of pelvic organ prolapse recurrence  risks from a prolapse repair, risk of urinary incontinence, risk of injury to the ureter as well as risk of fistula formation.  These  are topics that we reviewed.  We also reviewed the options for repair of uterovaginal prolapse we discussed vaginal hysterectomy with uterosacral suspension as well as a supracervical hysterectomy with sacrocolpopexy.  I answered patient's questions about uterine sparing procedures to we talked about the importance of this supporting the apex of the vagina during the repair of UVP .          Record Review: extract from my last note  1. Bilateral hydronephrosis   2. Pelvic organ prolapse    2a. POP-Q 6/8/23: Aa: +3. Ba: +3 C: -1.5 Gh: 4.5. Pb: 4.5 TVL: 8 Ap: -1.5. Bp: -1.5. D: -4.   3. CT 4/19/23 suspicious for right sided bladder mass - neg bx  4. Neuromyelitis opitica disease (NMO), with a history of transverse myelitis   4a. followed by Reji Ryan   4b. rituximab   5. Estrogen receptor positive left-sided breast cancer s/p resection  6. Graves disease   7. Chronic hep B on Entecavir   8. History of ovarian cysts   9. [place], has DPA  10. 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 Hx endometrial hyperplasia and underwent an ablation  12. low flow and high pressure bladder outlet obstruction on UDS 9/7/23    12a. ?secondary to prolapse, improved emptying with reduction of prolapse     Today's Visit:   Last seen by myself 8/3/23. 73 y/o female presents for review of UDS and discussion of symptoms. She had PMB, but cannot recall year, and Hx hyperplasia in the uterus. She had a biopsy and ablation around 2014 -2017 and no bleeding since this. She reports 2016 procedure with Dr. Hqa. She is [place], and she is willing to discuss fractions but no blood products. Patient has an allergy or adverse reaction to Motrin SUSP (adverse reaction: lips and face swelling; swelling), ibuprofen, lisinopril TABS, Lyrica CAPS. Denied: losartan potassium TABS. Patient is a former smoker.      Plan:   UA was clear.      I reviewed results of UDS 9/7/23 with the  patient. Review of urodynamic testing revealed on uroflow the patient voided 31 cc with a peak flow of 5 cc/sec. Patient left 125 cc residual. On CMG, the patient had the first desire to void at 48 cc and strong desire at 184 cc. There was no evidence of detrusor overactivity. There was no evidence of stress urinary incontinence with reduction of prolapse. Patient had a bladder capacity of 243 cc. On pressure flow study, the patient was given permission to void and voided a total of 174 cc in two streams that peaked at 5 cc/sec. Detrusor pressure at maximum flow at 70 cm of H2O. Patient left 69 cc residual. EMG activity during flow was synergetic. Pressure flow is suggestive of low flow and high pressure bladder outlet obstruction that could be secondary to prolapse, improved emptying with reduction of prolapse.      She has an anteroapical prolapse and small rectocele. We discussed pessary and surgical options. She is interested in hysterectomy due to her breast CA and concern about pelvic CA, and we discussed that we could evaluate her concerns about pelvic CA with TVUS. We discussed surgical procedures (including rates of recurrence and post-operative care instructions) in great detail including vaginal hysterectomy, USL suspension, anterior repair, and possible perineoplasty. Since she has concerns about Hx estrogen-receptor positive breast CA, we could complete bilateral salpingo-oophorectomy. We discussed that a laparoscopic approach could be necessary to remove ovaries during surgery. We discussed robotic sacrocolpopexy in great detail in the context of her Hx endometrial hyperplasia. We discussed that the rate of recurrence may be less with this procedure, but would retain cervix, which is concerning given her endometrial hyperplasia. She will proceed with vaginal hysterectomy, USLS, bilateral salpingo-oophorectomy, and possible perineoplasty. She does Rituxan every 6 months and would like to proceed with  this procedure in October. She will call to schedule surgery, and we discussed that she could try pessary in the interim before surgery, but she does not want to proceed with pessary at this time. She is [employer], and she is willing to discuss fractions but no blood products. My office will provide literature and instructions.      All questions and concerns were addressed. Patient verbalizes understanding and has no other questions at this time.    Past Medical History:       Past Medical History:   Diagnosis Date    Acute atopic conjunctivitis, bilateral 05/09/2018    Allergic conjunctivitis of both eyes    Acute transverse myelitis in demyelinating disease of central nervous system (CMS/HCC) 11/20/2017    Transverse myelitis    Age-related nuclear cataract, right eye 12/20/2022    Age-related nuclear cataract of right eye    Breast cancer (CMS/HCC) 2021    Left    Chronic viral hepatitis C (CMS/HCC) 02/23/2018    Chronic hepatitis C without hepatic coma    Chronic viral hepatitis C (CMS/HCC) 04/20/2018    Chronic hepatitis C without hepatic coma    Chronic viral hepatitis C (CMS/HCC) 05/25/2018    Chronic hepatitis C without hepatic coma    Chronic viral hepatitis C (CMS/HCC) 07/17/2018    Chronic hepatitis C without hepatic coma    Congenital cataract     Congenital cataract    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    Neuromyelitis optica    Ocular pain, unspecified eye 03/10/2014    Eye pain    Other abnormal and inconclusive findings on diagnostic imaging of breast 11/03/2020    Abnormal finding on breast imaging    Other anomalies of pupillary function 06/21/2019    Relative afferent pupillary defect of right eye    Other microscopic hematuria     Microscopic hematuria    Other specified abnormal findings of blood chemistry     Abnormal liver function test    Other specified abnormal findings of blood chemistry 07/17/2018    Abnormal liver function test    Other specified abnormal  immunological findings in serum 02/23/2018    Hepatitis B core antibody positive    Other specified abnormal immunological findings in serum 04/20/2018    Hepatitis B core antibody positive    Other specified abnormal immunological findings in serum 05/25/2018    Hepatitis B core antibody positive    Other specified abnormal immunological findings in serum 07/17/2018    Hepatitis B core antibody positive    Other subjective visual disturbances 12/20/2022    Photopsia of right eye    Personal history of irradiation 2021    Left breast    Personal history of other diseases of the circulatory system     History of hypertension    Personal history of other diseases of the nervous system and sense organs 05/09/2018    History of acute conjunctivitis    Personal history of other diseases of the nervous system and sense organs 12/20/2022    History of acute conjunctivitis    Personal history of other diseases of the nervous system and sense organs 02/22/2013    History of cataract    Personal history of other endocrine, nutritional and metabolic disease     History of Graves' disease    Personal history of other infectious and parasitic diseases 11/13/2018    History of hepatitis C virus infection    Personal history of other specified conditions     History of abdominal pain    Personal history of other specified conditions 12/04/2019    History of headache    Pupillary abnormality, left eye 10/03/2017    Pupillary abnormality of left eye    Rheumatoid arthritis, unspecified (CMS/HCC)     Rheumatoid arthritis    Spondylosis without myelopathy or radiculopathy, cervical region     Cervical spondylosis    Spondylosis without myelopathy or radiculopathy, cervical region 07/03/2014    Cervical spondylosis    Unspecified abdominal pain 04/20/2018    Abdominal pain    Unspecified abdominal pain 05/25/2018    Abdominal pain    Unspecified abdominal pain 07/17/2018    Abdominal pain    Unspecified optic neuritis 12/20/2022     Optic neuritis, right    Vitamin D deficiency, unspecified 07/17/2018    Vitamin D insufficiency          Past Surgical History:       Past Surgical History:   Procedure Laterality Date    BREAST BIOPSY Right 2019    ex bx benign    BREAST LUMPECTOMY Left 12/2020    CATARACT EXTRACTION  10/24/2017    Cataract Surgery    COLONOSCOPY  12/07/2012    Complete Colonoscopy    DILATION AND CURETTAGE OF UTERUS  12/07/2012    Dilation And Curettage    IR CVC TUNNELED  10/25/2017    IR CVC TUNNELED 10/25/2017 [facility] [place] INPATIENT LEGAC[facility]    LAPAROSCOPY DIAGNOSTIC / BIOPSY / ASPIRATION / LYSIS  12/07/2012    Exploratory Laparoscopy    OTHER SURGICAL HISTORY  12/07/2012    Bunion Correction By Garcia Procedure    OTHER SURGICAL HISTORY  12/07/2012    Endometrial Biopsy By Suction    OTHER SURGICAL HISTORY  12/07/2012    Ankle Arthrodesis Left    OTHER SURGICAL HISTORY Bilateral 12/2020    Bilateral breast reduction    ROTATOR CUFF REPAIR  12/07/2012    Rotator Cuff Repair    TOTAL KNEE ARTHROPLASTY  12/07/2012    Knee Replacement    TOTAL KNEE ARTHROPLASTY  07/03/2014    Total Knee Arthroplasty         Medications:       Prior to Admission medications    Medication Sig Start Date End Date Taking? Authorizing Provider   albuterol 2.5 mg /3 mL (0.083 %) nebulizer solution Take by nebulization 4 times a day. 3/15/23   Historical Provider, MD   albuterol 90 mcg/actuation inhaler Inhale 2 puffs every 6 hours if needed.    Historical Provider, MD   amoxicillin-pot clavulanate (Augmentin) 875-125 mg tablet Take 1 tablet (875 mg) by mouth 2 times a day for 10 days. 10/25/23 11/4/23  Jessy Bowden, DO   artificial tears, dextran-hypomel-glycerin, 0.1-0.3-0.2 % ophthalmic solution Administer 2 drops into the left eye every 4 hours. 10/14/17   Historical Provider, MD   aspirin 325 mg tablet Take 1 tablet (325 mg) by mouth once daily as needed for headaches or moderate pain (4 - 6).    Historical Provider, MD   buPROPion (Wellbutrin) 75 mg tablet  Take 1 tablet (75 mg) by mouth early in the morning..  Patient not taking: Reported on 10/25/2023 10/2/23 10/1/24  Reji Ryan MD   calcium carb/vitamin D3/vit K1 (VIACTIV ORAL) Take by mouth once daily.    Historical Provider, MD   carboxymethylcellulose (Refresh Celluvisc) 1 % ophthalmic solution dropperette Administer 2 drops into the left eye 4 times a day. 10/14/17   Historical Provider, MD   cholecalciferol (Vitamin D-3) 1,250 mcg (50,000 unit) capsule Take 1 capsule (50,000 Units) by mouth 1 (one) time per week.    Historical Provider, MD   ciprofloxacin (Ciloxan) 0.3 % ophthalmic solution Administer 1 drop into both eyes every 2 hours for 10 days. 10/29/23 11/8/23  Jessy Bowden DO   entecavir (Baraclude) 0.5 mg tablet TAKE ONE (1) TABLET BY MOUTH DAILY 1 OR 2 HOURS BEFORE A MEAL. 7/25/23 7/24/24  Magnus Kim MD   erythromycin (Romycin) 5 mg/gram (0.5 %) ophthalmic ointment Apply to affected eye(s) 4 times a day for 7 days. Apply Amount per Dose: 0.5 inch (~1 cm) per dose. 10/26/23 11/2/23  Jessy Bowden DO   ketoconazole (NIZOral) 2 % shampoo Apply topically. ply 1 Application daily topically. Lather on to scalp, let sit for 2-5 minutes. Rinse thoroughly. Use 2-3 times per week. 6/19/23   Historical Provider, MD   riTUXimab (Rituxan) 10 mg/mL injection Infuse 100 mL (1,000 mg total) into a venous catheter every 6 months. 8/26/19   Reji Ryan MD   Synthroid 100 mcg tablet Take 1 Tablet for 6 Days A Week And 1 Day Off    Historical Provider, MD   triamcinolone (Kenalog) 0.1 % cream Apply to itchy areas (avoid face, skin folds) 2x daily for up to 2 weeks 10/10/23   Kerri Pacheco DO   valsartan (Diovan) 40 mg tablet Take 0.5 tablets (20 mg) by mouth once daily.    Historical Provider, MD   erythromycin (Romycin) 5 mg/gram (0.5 %) ophthalmic ointment Apply to affected eye(s) 4 times a day for 7 days. Apply Amount per Dose: 0.5 inch (~1 cm) per dose. 10/25/23 10/26/23  Jessy Bowden DO         Data and DIAGNOSTIC STUDIES REVIEWED   BI mammo bilateral diagnostic tomosynthesis    Result Date: 10/24/2023  Interpreted By:  Ondina Yost, STUDY: BI MAMMO BILATERAL DIAGNOSTIC TOMOSYNTHESIS;  10/24/2023 3:15 pm   ACCESSION NUMBER(S): [id number]   ORDERING CLINICIAN: ALCON TAVARES   INDICATION: History of a left lumpectomy with radiation therapy. History of a bilateral breast reduction. Right excisional benign biopsy. Family history of breast cancer with her mother diagnosed at age 43.   COMPARISON: 10/20/2022, 10/15/2021, 10/01/2020   FINDINGS: 2D and tomosynthesis images were reviewed at 1 mm slice thickness.   Density:  There are areas of scattered fibroglandular tissue.   In the central lateral aspect of the left breast there are surgical clips and scarring at the lumpectomy site. The left lumpectomy site is stable. Bilateral postreduction changes are present. The parenchymal pattern is stable. No suspicious masses or calcifications are identified.       No mammographic evidence of malignancy.   BI-RADS CATEGORY:   BI-RADS Category:  2 Benign. Recommendation:  Routine Screening Mammogram in 1 Year. Recommended Date:  1 Year. Laterality:  Bilateral.   For any future breast imaging appointments, please call [phone] ([phone]).     MACRO: None   Signed by: Ondina Yost 10/24/2023 3:19 PM Dictation workstation:   [id number]    US thyroid    Result Date: 10/13/2023  Interpreted By:  Asa Modi, STUDY: US THYROID;  10/12/2023 1:00 pm   INDICATION: Signs/Symptoms:e04.1.   COMPARISON: 06/09/2022   ACCESSION NUMBER(S): [id number]   ORDERING CLINICIAN: ADITHYA FERNANDO   TECHNIQUE: Multiple ultrasonographic images of the thyroid gland were obtained.   FINDINGS: Heterogeneous thyroid gland.   RIGHT LOBE: 4.2 x 1.4 x 1.4 cm. Mid zone solid hypoechoic TR 4 nodule measuring 1.4 x 0.8 x 0.9 cm, previously 1.1 x 1 x 0.7 cm.   LEFT LOBE: 3.1 x 1.1 x 1.4 cm.   ISTHMUS: 0.1 cm.       TR 4 nodule in the  right lobe has slightly increased in size. Based on its size recommend continued follow-up.   Signed by: Asa Modi 10/13/2023 2:38 PM Dictation workstation:   [id number]     Lab Results   Component Value Date    URINECULTURE NO SIGNIFICANT GROWTH. 06/19/2023      Lab Results   Component Value Date    GLUCOSE 98 06/19/2023    CALCIUM 9.7 06/19/2023     06/19/2023    K 4.2 06/19/2023    CO2 25 06/19/2023     06/19/2023    BUN 15 06/19/2023    CREATININE 0.64 06/19/2023     Lab Results   Component Value Date    WBC 5.0 09/19/2023    HGB 14.5 09/19/2023    HCT 44.2 09/19/2023    MCV 92 09/19/2023     09/19/2023

## 2023-11-01 ENCOUNTER — OFFICE VISIT (OUTPATIENT)
Dept: OPHTHALMOLOGY | Facility: CLINIC | Age: 73
End: 2023-11-01
Payer: MEDICARE

## 2023-11-01 DIAGNOSIS — H04.123 BILATERAL DRY EYES: ICD-10-CM

## 2023-11-01 DIAGNOSIS — H10.32 ACUTE BACTERIAL CONJUNCTIVITIS OF LEFT EYE: ICD-10-CM

## 2023-11-01 DIAGNOSIS — H53.411 CENTRAL SCOTOMA, RIGHT EYE: ICD-10-CM

## 2023-11-01 DIAGNOSIS — H35.89 OTHER SPECIFIED RETINAL DISORDERS: ICD-10-CM

## 2023-11-01 DIAGNOSIS — H16.9 BILATERAL KERATITIS: Primary | ICD-10-CM

## 2023-11-01 PROCEDURE — 99213 OFFICE O/P EST LOW 20 MIN: CPT | Performed by: OPHTHALMOLOGY

## 2023-11-01 RX ORDER — POLYMYXIN B SULFATE AND TRIMETHOPRIM 1; 10000 MG/ML; [USP'U]/ML
1 SOLUTION OPHTHALMIC EVERY 4 HOURS
Qty: 3 ML | Refills: 0 | Status: SHIPPED | OUTPATIENT
Start: 2023-11-01 | End: 2023-11-11

## 2023-11-01 ASSESSMENT — VISUAL ACUITY
METHOD: SNELLEN - LINEAR
OS_CC: HM
OD_CC: 20/25-2
CORRECTION_TYPE: GLASSES

## 2023-11-01 ASSESSMENT — CUP TO DISC RATIO: OD_RATIO: 0.4

## 2023-11-01 ASSESSMENT — EXTERNAL EXAM - LEFT EYE: OS_EXAM: NORMAL

## 2023-11-01 ASSESSMENT — SLIT LAMP EXAM - LIDS
COMMENTS: NORMAL
COMMENTS: NORMAL

## 2023-11-01 ASSESSMENT — EXTERNAL EXAM - RIGHT EYE: OD_EXAM: NORMAL

## 2023-11-01 NOTE — PROGRESS NOTES
Assessment/Plan   Diagnoses and all orders for this visit:  Other specified retinal disorders  Acute bacterial conjunctivitis of left eye  -     Referral to Ophthalmology  Bilateral keratitis  Central scotoma, right eye  Bilateral dry eyes          She Is complaining  for bilateral pain and red eyes  I do not see evidence of scleritis or episcleritis    I would recommend continue erythromycin both eyes (OU)      No OCT one today as she has red eyes    Recommend see Dr Morillo

## 2023-11-13 ENCOUNTER — OFFICE VISIT (OUTPATIENT)
Dept: UROLOGY | Facility: CLINIC | Age: 73
End: 2023-11-13
Payer: MEDICARE

## 2023-11-13 VITALS
DIASTOLIC BLOOD PRESSURE: 79 MMHG | TEMPERATURE: 96.9 F | WEIGHT: 180 LBS | SYSTOLIC BLOOD PRESSURE: 138 MMHG | HEIGHT: 63 IN | HEART RATE: 90 BPM | BODY MASS INDEX: 31.89 KG/M2

## 2023-11-13 DIAGNOSIS — N95.8 GENITOURINARY SYNDROME OF MENOPAUSE: ICD-10-CM

## 2023-11-13 DIAGNOSIS — N81.4 UTEROVAGINAL PROLAPSE: Primary | ICD-10-CM

## 2023-11-13 PROCEDURE — 99212 OFFICE O/P EST SF 10 MIN: CPT | Performed by: NURSE PRACTITIONER

## 2023-11-13 PROCEDURE — 1126F AMNT PAIN NOTED NONE PRSNT: CPT | Performed by: NURSE PRACTITIONER

## 2023-11-13 PROCEDURE — 57160 INSERT PESSARY/OTHER DEVICE: CPT | Performed by: NURSE PRACTITIONER

## 2023-11-13 PROCEDURE — 1036F TOBACCO NON-USER: CPT | Performed by: NURSE PRACTITIONER

## 2023-11-13 PROCEDURE — 3078F DIAST BP <80 MM HG: CPT | Performed by: NURSE PRACTITIONER

## 2023-11-13 PROCEDURE — 3075F SYST BP GE 130 - 139MM HG: CPT | Performed by: NURSE PRACTITIONER

## 2023-11-13 PROCEDURE — 1159F MED LIST DOCD IN RCRD: CPT | Performed by: NURSE PRACTITIONER

## 2023-11-13 NOTE — PROGRESS NOTES
"11/13/23   [id number]    Pessary fitting     Subjective      HPI Kristina Lundberg is a 73 y.o. female who presents for pessary fitting, planning surgery for uterovaginal prolapse w Dr. Berg, here today for pessary fitting to reduce prolapse until surgery; no hydro on 6/2023 CT;     Imported from Dr. Berg 10/31/23 last notes for extensive history;   1. Bilateral hydronephrosis   2. Pelvic organ prolapse    2a. POP-Q 6/8/23: Aa: +3. Ba: +3 C: -1.5 Gh: 4.5. Pb: 4.5 TVL: 8 Ap: -1.5. Bp: -1.5. D: -4.   3. CT 4/19/23 suspicious for right sided bladder mass - neg bx  4. Neuromyelitis opitica disease (NMO), with a history of transverse myelitis   4a. followed by Reji Ryan   4b. rituximab   5. Estrogen receptor positive left-sided breast cancer s/p resection  6. Graves disease   7. Chronic hep B on Entecavir   8. History of ovarian cysts   9. [place], has DPA  10. 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 Hx endometrial hyperplasia and underwent an ablation  12. low flow and high pressure bladder outlet obstruction on UDS 9/7/23    12a. ?secondary to prolapse, improved emptying with reduction of prolapse      Objective     /79 (BP Location: Right arm, Patient Position: Sitting, BP Cuff Size: Large adult)   Pulse 90   Temp 36.1 °C (96.9 °F) (Temporal)   Ht 1.6 m (5' 3\")   Wt 81.6 kg (180 lb)   BMI 31.89 kg/m²    Physical Exam  General: Appears comfortable and in no apparent distress, well nourished  Head: Normocephalic, atraumatic  Neck: trachea midline  Respiratory: respirations unlabored, no wheezes, and no use of accessory muscles  Cardiovascular: at rest no dyspnea, well perfused  Skin: no visible rashes or lesions  Neurologic: grossly intact, oriented to person, place, and time  Psychiatric: mood and affect appropriate  Musculoskeletal: in chair for appt. no difficulty w upper body movement      Assessment/Plan   Problem List Items Addressed This " Visit    None  Visit Diagnoses       Uterovaginal prolapse    -  Primary    Genitourinary syndrome of menopause              No orders of the defined types were placed in this encounter.     Gelhorn size 3 inserted today in office  Trimosan ([facility]) or Replense until she can get the trimosan  Follow up otoniel Paul or Dr. Berg in 1-2 weeks  Nurse line [phone]      Procedures  Failed both size 4 and 5 ring pessary, donut uncomfortable to insert, size 3 gelhorn stayed in place and comfortable; voided and valsava without difficulty;        Beverly Peters, APRN-CNP  Lab Results   Component Value Date    GLUCOSE 98 06/19/2023    CALCIUM 9.7 06/19/2023     06/19/2023    K 4.2 06/19/2023    CO2 25 06/19/2023     06/19/2023    BUN 15 06/19/2023    CREATININE 0.64 06/19/2023

## 2023-11-13 NOTE — PATIENT INSTRUCTIONS
Gelhorn size 3 inserted today in office  Trimosan ([employer]) or Replense until she can get the trimosan  Follow up otoniel Paul or Dr. Berg in 1-2 weeks  Nurse line [phone]

## 2023-11-14 ENCOUNTER — OFFICE VISIT (OUTPATIENT)
Dept: OPHTHALMOLOGY | Facility: CLINIC | Age: 73
End: 2023-11-14
Payer: MEDICARE

## 2023-11-14 ENCOUNTER — TELEPHONE (OUTPATIENT)
Dept: UROLOGY | Facility: CLINIC | Age: 73
End: 2023-11-14

## 2023-11-14 DIAGNOSIS — E05.00 GRAVES DISEASE: ICD-10-CM

## 2023-11-14 DIAGNOSIS — H43.811 PVD (POSTERIOR VITREOUS DETACHMENT), RIGHT EYE: ICD-10-CM

## 2023-11-14 DIAGNOSIS — H04.123 BILATERAL DRY EYES: ICD-10-CM

## 2023-11-14 DIAGNOSIS — H25.11 AGE-RELATED NUCLEAR CATARACT OF RIGHT EYE: Primary | ICD-10-CM

## 2023-11-14 DIAGNOSIS — H16.9 BILATERAL KERATITIS: ICD-10-CM

## 2023-11-14 DIAGNOSIS — H40.053 RAISED INTRAOCULAR PRESSURE OF BOTH EYES: ICD-10-CM

## 2023-11-14 DIAGNOSIS — G36.0 NEUROMYELITIS OPTICA (MULTI): ICD-10-CM

## 2023-11-14 DIAGNOSIS — H01.00B BLEPHARITIS OF BOTH UPPER AND LOWER EYELID OF LEFT EYE, UNSPECIFIED TYPE: ICD-10-CM

## 2023-11-14 PROCEDURE — 83516 IMMUNOASSAY NONANTIBODY: CPT | Performed by: OPTOMETRIST

## 2023-11-14 PROCEDURE — 92012 INTRM OPH EXAM EST PATIENT: CPT | Performed by: OPTOMETRIST

## 2023-11-14 RX ORDER — AZITHROMYCIN 250 MG/1
250 TABLET, FILM COATED ORAL DAILY
Qty: 6 TABLET | Refills: 0 | Status: SHIPPED | OUTPATIENT
Start: 2023-11-14 | End: 2023-11-20

## 2023-11-14 ASSESSMENT — VISUAL ACUITY
CORRECTION_TYPE: GLASSES
OS_CC: LP
METHOD: SNELLEN - LINEAR
OD_CC: 20/20
OD_CC+: -2

## 2023-11-14 ASSESSMENT — ENCOUNTER SYMPTOMS
EYES NEGATIVE: 1
ALLERGIC/IMMUNOLOGIC NEGATIVE: 0
HEMATOLOGIC/LYMPHATIC NEGATIVE: 0
CONSTITUTIONAL NEGATIVE: 0
GASTROINTESTINAL NEGATIVE: 0
ENDOCRINE NEGATIVE: 0
NEUROLOGICAL NEGATIVE: 0
CARDIOVASCULAR NEGATIVE: 0
RESPIRATORY NEGATIVE: 0
MUSCULOSKELETAL NEGATIVE: 0
PSYCHIATRIC NEGATIVE: 0

## 2023-11-14 ASSESSMENT — SCHIRMERS TEST
OD_SCHIRMERS: 21
OS_SCHIRMERS: 10

## 2023-11-14 ASSESSMENT — SLIT LAMP EXAM - LIDS
COMMENTS: NORMAL
COMMENTS: NORMAL

## 2023-11-14 ASSESSMENT — CUP TO DISC RATIO: OD_RATIO: 0.4

## 2023-11-14 ASSESSMENT — EXTERNAL EXAM - RIGHT EYE: OD_EXAM: NORMAL

## 2023-11-14 ASSESSMENT — EXTERNAL EXAM - LEFT EYE: OS_EXAM: NORMAL

## 2023-11-14 NOTE — TELEPHONE ENCOUNTER
Pt left message stating her pessary fell out today. She cancelled her appt for tomorrow to have it taken out for her to get the TVUS however she is asking if she should even bother coming to next weeks follow up appt because it didn't work and stay in. Should the pt come next week to her pessary follow up appt?

## 2023-11-14 NOTE — PROGRESS NOTES
GD and significicant saponification of tear film.  MGD all eyelids.  Exposed conncretions right upper tarsus and was removed with cotton tip applicator after application of anesthetic.  Started FML BID OU and DC`d after 1 week.  Start Brudermask WC 5-10 minutes BID Soothe (PF) or refresh (PF) drops Bid.    Testing to evaluate the presence of dry eye disease (DED) was performed today and provided the following results:  The ocular surface disease index questionnaire (OSDI) score was  (scale: 0-12 = normal, 13-22 = mild, 23-32 = moderate, >33 = severe): 72 was 39  TearLab, a test for tear film osmolarity revealed (normal <300 mOsm/L, mild 300-320, moderate 320-340, severe >340 mOsm/L, inter eye difference of >8 mOsm/L is considered abnormal as well)  OD: 281 was 280 mOsm/L  OS: 286 was 281 mOsm/L  Inflammadry, a test for the DED specific MMP-9 inhibitor:  OD:  mild positive was unable  OS:  Mild positive was unable  Schirmer`s test with anesthetic, testing basal tear production (0-5 = severe, 6-10 = moderate, 11-15 = mild):  OD: 21 was 10 mm between 1-6 minutes  OS:  10 was 10 mm between 1-6 minutes  Tear break up time (TBUT), a measure of tear stability (0-5 = severe, 6-10 = moderate, 11-15 = mild)  OD:   2 seconds  OS:   2 seconds  Corneal punctate epithelial erosions (PEE) staining with sodium fluorescein score (5 zones, each PEE level 0-3, maximum score = 15)  OD: NIH PEE score: 0  Central PEE absent  OS: NIH PEE score:  +1 Superior 0 Central PEE absent  Pt feels that she is doing better.  Recommend Theratears hypochlorous acid spray as well QD, continue AT`s as before BID at least and WC Brudermask BID.  RTc 6 months for all dry eye tests.  Pt has NMO and is very worried about anything affectiung her eyes.  Fluctuation more due to DED and cataract than NMO.  A spectacle prescription was dispensed to be used as needed.  Trivex due to NLP from NMO OS.  Pt feels VA should be better, limit today was 20/30 VA OD.  RTC  for dry eye testing.  Provided instructions on how to use Sterilid hypochlorous acid as well today.   MIld change in cylinder OD improved VA from 20/40 to 20/25.  A spectacle prescription was dispensed to be used as needed.  Pt advised that this may not appear to be much of an improvement.  VA has fluctuated between 20/25 and 20/50 back and forth over the past 2-3 years. 20/25 today.  A spectacle prescription was dispensed to be used as needed.  Trivex. Another Rx for computer and near.  Recommenf flat top bifocals as PAL is interfering with good acuity. Careful refraction today.    Dry eyes and stopped doing Brudermask BID. Resume.   Start using iVizia at least BID.  Recent Hx of preseptal cellulitis left upper eyelid (ASTON).   Azithromycin course 250 mg x 5 days.  Has tried wipes and made eyes feel worse.  Left eye (OS) has staining at bleb.   RTC 3 months for all dry eye tests and meibography at [facility].

## 2023-11-15 ENCOUNTER — OFFICE VISIT (OUTPATIENT)
Dept: PRIMARY CARE | Facility: CLINIC | Age: 73
End: 2023-11-15
Payer: MEDICARE

## 2023-11-15 ENCOUNTER — APPOINTMENT (OUTPATIENT)
Dept: UROLOGY | Facility: CLINIC | Age: 73
End: 2023-11-15
Payer: MEDICARE

## 2023-11-15 VITALS
DIASTOLIC BLOOD PRESSURE: 77 MMHG | HEART RATE: 80 BPM | SYSTOLIC BLOOD PRESSURE: 116 MMHG | BODY MASS INDEX: 32.07 KG/M2 | HEIGHT: 63 IN | TEMPERATURE: 98 F | WEIGHT: 181 LBS

## 2023-11-15 DIAGNOSIS — E66.09 CLASS 1 OBESITY DUE TO EXCESS CALORIES WITH SERIOUS COMORBIDITY AND BODY MASS INDEX (BMI) OF 32.0 TO 32.9 IN ADULT: ICD-10-CM

## 2023-11-15 DIAGNOSIS — H92.01 DISCOMFORT OF RIGHT EAR: Primary | ICD-10-CM

## 2023-11-15 PROBLEM — E66.811 CLASS 1 OBESITY DUE TO EXCESS CALORIES WITH SERIOUS COMORBIDITY AND BODY MASS INDEX (BMI) OF 32.0 TO 32.9 IN ADULT: Status: ACTIVE | Noted: 2023-11-15

## 2023-11-15 PROBLEM — L65.0 TELOGEN EFFLUVIUM: Status: ACTIVE | Noted: 2023-05-15

## 2023-11-15 PROBLEM — G36.9: Status: ACTIVE | Noted: 2023-01-17

## 2023-11-15 PROCEDURE — 1036F TOBACCO NON-USER: CPT | Performed by: INTERNAL MEDICINE

## 2023-11-15 PROCEDURE — 1160F RVW MEDS BY RX/DR IN RCRD: CPT | Performed by: INTERNAL MEDICINE

## 2023-11-15 PROCEDURE — 1126F AMNT PAIN NOTED NONE PRSNT: CPT | Performed by: INTERNAL MEDICINE

## 2023-11-15 PROCEDURE — 3078F DIAST BP <80 MM HG: CPT | Performed by: INTERNAL MEDICINE

## 2023-11-15 PROCEDURE — 1159F MED LIST DOCD IN RCRD: CPT | Performed by: INTERNAL MEDICINE

## 2023-11-15 PROCEDURE — 3008F BODY MASS INDEX DOCD: CPT | Performed by: INTERNAL MEDICINE

## 2023-11-15 PROCEDURE — 99213 OFFICE O/P EST LOW 20 MIN: CPT | Performed by: INTERNAL MEDICINE

## 2023-11-15 PROCEDURE — 3074F SYST BP LT 130 MM HG: CPT | Performed by: INTERNAL MEDICINE

## 2023-11-15 RX ORDER — VALSARTAN 40 MG/1
TABLET ORAL
COMMUNITY
Start: 2023-01-27 | End: 2024-01-12 | Stop reason: WASHOUT

## 2023-11-15 ASSESSMENT — PATIENT HEALTH QUESTIONNAIRE - PHQ9
SUM OF ALL RESPONSES TO PHQ9 QUESTIONS 1 AND 2: 0
2. FEELING DOWN, DEPRESSED OR HOPELESS: NOT AT ALL
1. LITTLE INTEREST OR PLEASURE IN DOING THINGS: NOT AT ALL

## 2023-11-15 ASSESSMENT — ENCOUNTER SYMPTOMS
DYSURIA: 0
SHORTNESS OF BREATH: 0
EYE PAIN: 0
CHILLS: 0
COUGH: 0
VOMITING: 0
NAUSEA: 0
FEVER: 0
SORE THROAT: 0
TROUBLE SWALLOWING: 0

## 2023-11-15 NOTE — TELEPHONE ENCOUNTER
Please be advised, we do have a size 4 gellhorn at [facility] and I have put it in my bag. How would you like me to proceed? Thanks!

## 2023-11-15 NOTE — PROGRESS NOTES
"Subjective   Patient ID: Kristina Lundberg is a 73 y.o. female who presents for Earache.    Earache   Pertinent negatives include no coughing, ear discharge, hearing loss, rash, sore throat or vomiting.    Patient presents for right earache.  She states \"both ears aren't great\".  She complains of chronic nasal congestion.  \"The other day in the evening-I felt this pain that went down my ear inside my mouth, a burning pain. She states her right jaw swollen up. When she brushes her teeth she feels this pain as well and points to the preauricular region.     Review of Systems   Constitutional:  Negative for chills and fever.   HENT:  Positive for congestion and ear pain. Negative for ear discharge, hearing loss, sore throat, tinnitus and trouble swallowing.    Eyes:  Negative for pain and visual disturbance.   Respiratory:  Negative for cough and shortness of breath.    Cardiovascular:  Negative for chest pain.   Gastrointestinal:  Negative for nausea and vomiting.   Genitourinary:  Negative for dysuria.   Skin:  Negative for rash.       Objective   /77   Pulse 80   Temp 36.7 °C (98 °F) (Temporal)   Ht 1.6 m (5' 3\")   Wt 82.1 kg (181 lb)   BMI 32.06 kg/m²     Physical Exam  Vitals and nursing note reviewed.   Constitutional:       General: She is not in acute distress.     Appearance: Normal appearance. She is obese. She is not ill-appearing or toxic-appearing.   HENT:      Head: Normocephalic and atraumatic.      Right Ear: Ear canal and external ear normal. A middle ear effusion (small right sided effusion) is present. No mastoid tenderness. No hemotympanum. Tympanic membrane is not injected, erythematous, retracted or bulging.      Left Ear: Tympanic membrane, ear canal and external ear normal. No mastoid tenderness. No hemotympanum. Tympanic membrane is not injected, erythematous, retracted or bulging.      Nose: Nose normal.      Mouth/Throat:      Mouth: Mucous membranes are moist.      Pharynx: " Oropharynx is clear.   Eyes:      Extraocular Movements: Extraocular movements intact.      Pupils: Pupils are equal, round, and reactive to light.   Cardiovascular:      Rate and Rhythm: Normal rate and regular rhythm.      Heart sounds: Normal heart sounds.   Pulmonary:      Effort: Pulmonary effort is normal.      Breath sounds: Normal breath sounds.   Musculoskeletal:      Cervical back: Neck supple.   Skin:     General: Skin is warm and dry.   Neurological:      Mental Status: She is alert.         Assessment/Plan   Problem List Items Addressed This Visit             ICD-10-CM    Discomfort of right ear - Primary H92.01    Relevant Orders    Referral to ENT    Class 1 obesity due to excess calories with serious comorbidity and body mass index (BMI) of 32.0 to 32.9 in adult E66.09, Z68.32     Discomfort of right ear: Differential includes eustachian tube dysfunction versus chronic otitis media versus TMJ syndrome versus trigeminal neuralgia.  Currently patient does not have evidence of acute infection.  No redness of the TM or bulging.  No fevers or chills.  She does describe some the symptoms worse with brushing her teeth feels a discomfort sensation that will sometimes radiate to the right jaw all right neck.  She feels like the pain is a deep pain in her right ear.  No pain changes in hearing.  We recommended over-the-counter Claritin and Flonase.  She will check with her eye doctor to see if she can use Flonase.  I have referred her to ENT due to the chronicity of this issue and no complete resolution.

## 2023-11-16 ENCOUNTER — HOSPITAL ENCOUNTER (OUTPATIENT)
Dept: RADIOLOGY | Facility: HOSPITAL | Age: 73
Discharge: HOME | End: 2023-11-16
Payer: MEDICARE

## 2023-11-16 DIAGNOSIS — N81.4 UTEROVAGINAL PROLAPSE: ICD-10-CM

## 2023-11-16 PROCEDURE — 76856 US EXAM PELVIC COMPLETE: CPT | Performed by: RADIOLOGY

## 2023-11-16 PROCEDURE — 76830 TRANSVAGINAL US NON-OB: CPT

## 2023-11-16 PROCEDURE — 76830 TRANSVAGINAL US NON-OB: CPT | Performed by: RADIOLOGY

## 2023-11-16 NOTE — TELEPHONE ENCOUNTER
Pt informed and scheduled for tues. December 5th at 3:40 for pessary insertion of bigger size. In drawer at [place].

## 2023-11-21 ENCOUNTER — APPOINTMENT (OUTPATIENT)
Dept: UROLOGY | Facility: CLINIC | Age: 73
End: 2023-11-21
Payer: MEDICARE

## 2023-12-01 ENCOUNTER — SPECIALTY PHARMACY (OUTPATIENT)
Dept: PHARMACY | Facility: CLINIC | Age: 73
End: 2023-12-01

## 2023-12-01 NOTE — PROGRESS NOTES
[facility] Clinical Note    Kristina Lundberg is a 73 y.o. female, who is on the specialty pharmacy service for management of: Hepatitis B Core with status of: (Enrolled)     Kristina was contacted on 12/1/2023.    Refer to the encounter summary report for documentation details about patient counseling and education.      Medication Adherence  The importance of adherence was discussed with the patient and they were advised to take the medication as prescribed by their provider. Kristina was encouraged to call her physician's office if they have a question regarding a missed dose.     Conclusion  Rate your quality of life on scale of 1-10: 5 (patient with NMO and experiencing a lot of fatigue)  Rate your satisfaction with  Specialty Pharmacy on scale of 1-10: 10 - Completely satisfied      Patient advised to contact the pharmacy if there are any changes to her medication list, including prescriptions, OTC medications, herbal products, or supplements. Patient was advised of [facility]’s dispensing process, refill timeline, contact information ([phone]), and patient management follow up. Patient confirmed understanding of education conducted during assessment. All patient questions and concerns were addressed to the best of my ability. Patient was encouraged to contact the specialty pharmacy with any questions or concerns.    Confirmed follow-up outreaches are properly scheduled. Reviewed goals of therapy in the program targets.    Bhavani Pond, RebekahD

## 2023-12-05 ENCOUNTER — APPOINTMENT (OUTPATIENT)
Dept: UROLOGY | Facility: CLINIC | Age: 73
End: 2023-12-05
Payer: MEDICARE

## 2023-12-21 ENCOUNTER — OFFICE VISIT (OUTPATIENT)
Dept: UROLOGY | Facility: CLINIC | Age: 73
End: 2023-12-21
Payer: MEDICARE

## 2023-12-21 VITALS
WEIGHT: 176 LBS | BODY MASS INDEX: 31.18 KG/M2 | TEMPERATURE: 96.6 F | SYSTOLIC BLOOD PRESSURE: 126 MMHG | DIASTOLIC BLOOD PRESSURE: 73 MMHG | HEART RATE: 84 BPM

## 2023-12-21 DIAGNOSIS — N81.4 UTEROVAGINAL PROLAPSE: ICD-10-CM

## 2023-12-21 DIAGNOSIS — R35.0 FREQUENCY OF URINATION: Primary | ICD-10-CM

## 2023-12-21 LAB
POC APPEARANCE, URINE: CLEAR
POC BILIRUBIN, URINE: NEGATIVE
POC BLOOD, URINE: NEGATIVE
POC COLOR, URINE: YELLOW
POC GLUCOSE, URINE: NEGATIVE MG/DL
POC KETONES, URINE: NEGATIVE MG/DL
POC LEUKOCYTES, URINE: NEGATIVE
POC NITRITE,URINE: NEGATIVE
POC PH, URINE: 6 PH
POC PROTEIN, URINE: NEGATIVE MG/DL
POC SPECIFIC GRAVITY, URINE: >=1.03
POC UROBILINOGEN, URINE: 0.2 EU/DL

## 2023-12-21 PROCEDURE — 1036F TOBACCO NON-USER: CPT | Performed by: UROLOGY

## 2023-12-21 PROCEDURE — 99214 OFFICE O/P EST MOD 30 MIN: CPT | Performed by: UROLOGY

## 2023-12-21 PROCEDURE — 3008F BODY MASS INDEX DOCD: CPT | Performed by: UROLOGY

## 2023-12-21 PROCEDURE — 3078F DIAST BP <80 MM HG: CPT | Performed by: UROLOGY

## 2023-12-21 PROCEDURE — 1159F MED LIST DOCD IN RCRD: CPT | Performed by: UROLOGY

## 2023-12-21 PROCEDURE — 1126F AMNT PAIN NOTED NONE PRSNT: CPT | Performed by: UROLOGY

## 2023-12-21 PROCEDURE — 3074F SYST BP LT 130 MM HG: CPT | Performed by: UROLOGY

## 2023-12-21 PROCEDURE — 1160F RVW MEDS BY RX/DR IN RCRD: CPT | Performed by: UROLOGY

## 2023-12-21 PROCEDURE — 81003 URINALYSIS AUTO W/O SCOPE: CPT | Performed by: UROLOGY

## 2023-12-22 ASSESSMENT — ENCOUNTER SYMPTOMS
CONSTITUTIONAL NEGATIVE: 1
PSYCHIATRIC NEGATIVE: 1
GASTROINTESTINAL NEGATIVE: 1
ALLERGIC/IMMUNOLOGIC NEGATIVE: 1
EYES NEGATIVE: 1
MUSCULOSKELETAL NEGATIVE: 1
NEUROLOGICAL NEGATIVE: 1
ENDOCRINE NEGATIVE: 1
HEMATOLOGIC/LYMPHATIC NEGATIVE: 1
RESPIRATORY NEGATIVE: 1
CARDIOVASCULAR NEGATIVE: 1

## 2023-12-25 PROBLEM — N81.2 UTEROVAGINAL PROLAPSE, INCOMPLETE: Chronic | Status: ACTIVE | Noted: 2023-12-25

## 2023-12-26 ENCOUNTER — APPOINTMENT (OUTPATIENT)
Dept: DERMATOLOGY | Facility: CLINIC | Age: 73
End: 2023-12-26
Payer: MEDICARE

## 2024-01-04 ENCOUNTER — APPOINTMENT (OUTPATIENT)
Dept: DERMATOLOGY | Facility: CLINIC | Age: 74
End: 2024-01-04
Payer: MEDICARE

## 2024-01-05 ENCOUNTER — APPOINTMENT (OUTPATIENT)
Dept: DERMATOLOGY | Facility: CLINIC | Age: 74
End: 2024-01-05
Payer: MEDICARE

## 2024-01-12 ENCOUNTER — OFFICE VISIT (OUTPATIENT)
Dept: PRIMARY CARE | Facility: CLINIC | Age: 74
End: 2024-01-12
Payer: MEDICARE

## 2024-01-12 VITALS
DIASTOLIC BLOOD PRESSURE: 70 MMHG | BODY MASS INDEX: 32.07 KG/M2 | TEMPERATURE: 97.4 F | HEIGHT: 63 IN | SYSTOLIC BLOOD PRESSURE: 118 MMHG | HEART RATE: 90 BPM | WEIGHT: 181 LBS

## 2024-01-12 DIAGNOSIS — E03.9 HYPOTHYROIDISM, UNSPECIFIED TYPE: Primary | ICD-10-CM

## 2024-01-12 PROCEDURE — 1036F TOBACCO NON-USER: CPT | Performed by: FAMILY MEDICINE

## 2024-01-12 PROCEDURE — 3074F SYST BP LT 130 MM HG: CPT | Performed by: FAMILY MEDICINE

## 2024-01-12 PROCEDURE — 1126F AMNT PAIN NOTED NONE PRSNT: CPT | Performed by: FAMILY MEDICINE

## 2024-01-12 PROCEDURE — 3078F DIAST BP <80 MM HG: CPT | Performed by: FAMILY MEDICINE

## 2024-01-12 PROCEDURE — 99213 OFFICE O/P EST LOW 20 MIN: CPT | Performed by: FAMILY MEDICINE

## 2024-01-12 PROCEDURE — 1160F RVW MEDS BY RX/DR IN RCRD: CPT | Performed by: FAMILY MEDICINE

## 2024-01-12 PROCEDURE — 3008F BODY MASS INDEX DOCD: CPT | Performed by: FAMILY MEDICINE

## 2024-01-12 PROCEDURE — 1159F MED LIST DOCD IN RCRD: CPT | Performed by: FAMILY MEDICINE

## 2024-01-12 NOTE — PROGRESS NOTES
"    /70   Pulse 90   Temp 36.3 °C (97.4 °F)   Ht 1.6 m (5' 3\")   Wt 82.1 kg (181 lb)   BMI 32.06 kg/m²     Past Medical History:   Diagnosis Date    Acute atopic conjunctivitis, bilateral 05/09/2018    Allergic conjunctivitis of both eyes    Acute transverse myelitis in demyelinating disease of central nervous system (CMS/HCC) 11/20/2017    Transverse myelitis    Age-related nuclear cataract, right eye 12/20/2022    Age-related nuclear cataract of right eye    Breast cancer (CMS/HCC) 2021    Left    Chronic viral hepatitis C (CMS/HCC) 02/23/2018    Chronic hepatitis C without hepatic coma    Chronic viral hepatitis C (CMS/HCC) 04/20/2018    Chronic hepatitis C without hepatic coma    Chronic viral hepatitis C (CMS/HCC) 05/25/2018    Chronic hepatitis C without hepatic coma    Chronic viral hepatitis C (CMS/HCC) 07/17/2018    Chronic hepatitis C without hepatic coma    Congenital cataract     Congenital cataract    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    Neuromyelitis optica    Ocular pain, unspecified eye 03/10/2014    Eye pain    Other abnormal and inconclusive findings on diagnostic imaging of breast 11/03/2020    Abnormal finding on breast imaging    Other anomalies of pupillary function 06/21/2019    Relative afferent pupillary defect of right eye    Other microscopic hematuria     Microscopic hematuria    Other specified abnormal findings of blood chemistry     Abnormal liver function test    Other specified abnormal findings of blood chemistry 07/17/2018    Abnormal liver function test    Other specified abnormal immunological findings in serum 02/23/2018    Hepatitis B core antibody positive    Other specified abnormal immunological findings in serum 04/20/2018    Hepatitis B core antibody positive    Other specified abnormal immunological findings in serum 05/25/2018    Hepatitis B core antibody positive    Other specified abnormal immunological findings in serum 07/17/2018    " Hepatitis B core antibody positive    Other subjective visual disturbances 12/20/2022    Photopsia of right eye    Personal history of irradiation 2021    Left breast    Personal history of other diseases of the circulatory system     History of hypertension    Personal history of other diseases of the nervous system and sense organs 05/09/2018    History of acute conjunctivitis    Personal history of other diseases of the nervous system and sense organs 12/20/2022    History of acute conjunctivitis    Personal history of other diseases of the nervous system and sense organs 02/22/2013    History of cataract    Personal history of other endocrine, nutritional and metabolic disease     History of Graves' disease    Personal history of other infectious and parasitic diseases 11/13/2018    History of hepatitis C virus infection    Personal history of other specified conditions     History of abdominal pain    Personal history of other specified conditions 12/04/2019    History of headache    Pupillary abnormality, left eye 10/03/2017    Pupillary abnormality of left eye    Rheumatoid arthritis, unspecified (CMS/HCC)     Rheumatoid arthritis    Spondylosis without myelopathy or radiculopathy, cervical region     Cervical spondylosis    Spondylosis without myelopathy or radiculopathy, cervical region 07/03/2014    Cervical spondylosis    Unspecified abdominal pain 04/20/2018    Abdominal pain    Unspecified abdominal pain 05/25/2018    Abdominal pain    Unspecified abdominal pain 07/17/2018    Abdominal pain    Unspecified optic neuritis 12/20/2022    Optic neuritis, right    Vitamin D deficiency, unspecified 07/17/2018    Vitamin D insufficiency       Patient Active Problem List   Diagnosis    Age-related nuclear cataract of right eye    Benign essential hypertension    Bilateral dry eyes    Bilateral keratitis    Blepharitis of both upper and lower eyelid of left eye    Central scotoma, right eye    Cervical  spondylosis    Chronic hepatitis C without hepatic coma (CMS/HCC)    Colon polyp    Conjunctival concretions of right eye    Dermatitis of face    Elevated IOP    Exposure to medical therapeutic radiation    Graves disease    Hepatitis B core antibody positive    Hepatitis C virus infection cured after antiviral drug therapy    Hip pain, right    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    Pain, upper back    Photopsia of right eye    PVD (posterior vitreous detachment), right eye    Shingles    Vitamin D deficiency    Hypothyroidism    Steroid responder, bilateral    Status post bilateral breast reduction    History of adenomatous polyp of colon    Drug-induced immunodeficiency (CMS/HCC)    Acute disseminated demyelination, unspecified (CMS/HCC)    Telogen effluvium    Transverse myelitis (CMS/HCC)    Discomfort of right ear    Class 1 obesity due to excess calories with serious comorbidity and body mass index (BMI) of 32.0 to 32.9 in adult    Uterovaginal prolapse, incomplete       Current Outpatient Medications   Medication Sig Dispense Refill    albuterol 2.5 mg /3 mL (0.083 %) nebulizer solution Take by nebulization 4 times a day.      albuterol 90 mcg/actuation inhaler Inhale 2 puffs every 6 hours if needed.      artificial tears, dextran-hypomel-glycerin, 0.1-0.3-0.2 % ophthalmic solution Administer 2 drops into the left eye every 4 hours.      aspirin 325 mg tablet Take 1 tablet (325 mg) by mouth once daily as needed for headaches or moderate pain (4 - 6).      buPROPion (Wellbutrin) 75 mg tablet Take 1 tablet (75 mg) by mouth early in the morning.. 30 tablet 11    calcium carb/vitamin D3/vit K1 (VIACTIV ORAL) Take by mouth once daily.      cholecalciferol (Vitamin D-3) 1,250 mcg (50,000 unit) capsule Take 1 capsule (50,000 Units) by mouth 1 (one) time per week.      entecavir (Baraclude) 0.5 mg tablet TAKE ONE (1) TABLET BY MOUTH DAILY 1 OR 2 HOURS BEFORE A MEAL. 90 tablet 3    ketoconazole  (NIZOral) 2 % shampoo Apply topically. ply 1 Application daily topically. Lather on to scalp, let sit for 2-5 minutes. Rinse thoroughly. Use 2-3 times per week.      riTUXimab (Rituxan) 10 mg/mL injection Infuse 100 mL (1,000 mg total) into a venous catheter every 6 months.      Synthroid 100 mcg tablet Take 1 Tablet for 6 Days A Week And 1 Day Off 90 tablet 0    triamcinolone (Kenalog) 0.1 % cream Apply to itchy areas (avoid face, skin folds) 2x daily for up to 2 weeks 454 g 1    valsartan (Diovan) 40 mg tablet TAKE 1/2 (ONE-HALF) OF A TABLET BY MOUTH DAILY 45 tablet 0    carboxymethylcellulose (Refresh Celluvisc) 1 % ophthalmic solution dropperette Administer 2 drops into the left eye 4 times a day.      valsartan (Diovan) 40 mg tablet 0.5 tablet DAILY (route: oral)       No current facility-administered medications for this visit.       CC/HPI/ASSESSMENT/PLAN    CC follow-up medication    HPI patient with a history of hypertension hypothyroidism transverse myelitis neuromyelitis optica.  Patient gets infusions on a regular basis.  Medication list extensive.  She is taking valsartan for blood pressure.  She remains on Synthroid 100 mcg 6 days a week.  She like an order for blood work.  She denies chest pain short of breath.  Medications reviewed.  Patient with a history of thyroid nodule this will be rechecked in the near future.  She was given Wellbutrin by one of the specialist but she has not started it.    Exam calm blood pressure good neck supple lungs CTA CV RRR    A/P 1.  Hypothyroidism chronic stable.  Blood work is ordered.  2 hypertension chronic stable.  Blood work is ordered continue medications follow-up4 months    There are no diagnoses linked to this encounter.

## 2024-01-16 ENCOUNTER — SPECIALTY PHARMACY (OUTPATIENT)
Dept: PHARMACY | Facility: CLINIC | Age: 74
End: 2024-01-16

## 2024-01-16 PROCEDURE — RXMED WILLOW AMBULATORY MEDICATION CHARGE

## 2024-01-19 ENCOUNTER — PHARMACY VISIT (OUTPATIENT)
Dept: PHARMACY | Facility: CLINIC | Age: 74
End: 2024-01-19
Payer: COMMERCIAL

## 2024-01-30 ENCOUNTER — OFFICE VISIT (OUTPATIENT)
Dept: DERMATOLOGY | Facility: CLINIC | Age: 74
End: 2024-01-30
Payer: MEDICARE

## 2024-01-30 DIAGNOSIS — Z12.83 ENCOUNTER FOR SCREENING FOR MALIGNANT NEOPLASM OF SKIN: Primary | ICD-10-CM

## 2024-01-30 DIAGNOSIS — L82.1 SEBORRHEIC KERATOSIS: ICD-10-CM

## 2024-01-30 DIAGNOSIS — L81.4 OTHER MELANIN HYPERPIGMENTATION: ICD-10-CM

## 2024-01-30 DIAGNOSIS — D18.01 HEMANGIOMA OF SKIN: ICD-10-CM

## 2024-01-30 DIAGNOSIS — L57.8 PHOTOAGING OF SKIN: ICD-10-CM

## 2024-01-30 DIAGNOSIS — L29.9 PRURITUS: ICD-10-CM

## 2024-01-30 DIAGNOSIS — D22.5 MELANOCYTIC NEVI OF TRUNK: ICD-10-CM

## 2024-01-30 PROCEDURE — 1126F AMNT PAIN NOTED NONE PRSNT: CPT | Performed by: DERMATOLOGY

## 2024-01-30 PROCEDURE — 1036F TOBACCO NON-USER: CPT | Performed by: DERMATOLOGY

## 2024-01-30 PROCEDURE — 3008F BODY MASS INDEX DOCD: CPT | Performed by: DERMATOLOGY

## 2024-01-30 PROCEDURE — 99213 OFFICE O/P EST LOW 20 MIN: CPT | Performed by: DERMATOLOGY

## 2024-01-30 PROCEDURE — 1159F MED LIST DOCD IN RCRD: CPT | Performed by: DERMATOLOGY

## 2024-01-30 PROCEDURE — 1157F ADVNC CARE PLAN IN RCRD: CPT | Performed by: DERMATOLOGY

## 2024-01-30 ASSESSMENT — DERMATOLOGY QUALITY OF LIFE (QOL) ASSESSMENT
RATE HOW EMOTIONALLY BOTHERED YOU ARE BY YOUR SKIN PROBLEM (FOR EXAMPLE, WORRY, EMBARRASSMENT, FRUSTRATION): 0 - NEVER BOTHERED
ARE THERE EXCLUSIONS OR EXCEPTIONS FOR THE QUALITY OF LIFE ASSESSMENT: NO
WHAT SINGLE SKIN CONDITION LISTED BELOW IS THE PATIENT ANSWERING THE QUALITY-OF-LIFE ASSESSMENT QUESTIONS ABOUT: NONE OF THE ABOVE
DATE THE QUALITY-OF-LIFE ASSESSMENT WAS COMPLETED: 66869
RATE HOW BOTHERED YOU ARE BY SYMPTOMS OF YOUR SKIN PROBLEM (EG, ITCHING, STINGING BURNING, HURTING OR SKIN IRRITATION): 0 - NEVER BOTHERED
RATE HOW BOTHERED YOU ARE BY EFFECTS OF YOUR SKIN PROBLEMS ON YOUR ACTIVITIES (EG, GOING OUT, ACCOMPLISHING WHAT YOU WANT, WORK ACTIVITIES OR YOUR RELATIONSHIPS WITH OTHERS): 0 - NEVER BOTHERED

## 2024-01-30 ASSESSMENT — ITCH NUMERIC RATING SCALE: HOW SEVERE IS YOUR ITCHING?: 0

## 2024-01-30 ASSESSMENT — DERMATOLOGY PATIENT ASSESSMENT
HAVE YOU HAD OR DO YOU HAVE A STAPH INFECTION: NO
ARE YOU TRYING TO GET PREGNANT: NO
DO YOU USE A TANNING BED: NO
ARE YOU AN ORGAN TRANSPLANT RECIPIENT: NO
ARE YOU ON BIRTH CONTROL: NO
HAVE YOU HAD OR DO YOU HAVE VASCULAR DISEASE: NO
DO YOU HAVE IRREGULAR MENSTRUAL CYCLES: NO
DO YOU HAVE ANY NEW OR CHANGING LESIONS: YES
FOR PATIENTS COMING IN FOR A FOLLOW-UP VISIT - HAVE THERE BEEN ANY CHANGES IN YOUR HEALTH SINCE YOUR LAST VISIT: NO

## 2024-01-30 ASSESSMENT — PATIENT GLOBAL ASSESSMENT (PGA): PATIENT GLOBAL ASSESSMENT: PATIENT GLOBAL ASSESSMENT:  2 - MILD

## 2024-01-30 NOTE — PROGRESS NOTES
Subjective     Kristina Lundberg is a 73 y.o. female who presents for the following: Skin Check (Pt here for FBSE with no hx. Pt reports areas of concern on back, left eyelid, nose, neck, and scalp. ).     Review of Systems:  No other skin or systemic complaints other than what is documented elsewhere in the note.    The following portions of the chart were reviewed this encounter and updated as appropriate:          Skin Cancer History  No skin cancer on file.      Specialty Problems          Dermatology Problems    Telogen effluvium    Dermatitis of face        Objective   Well appearing patient in no apparent distress; mood and affect are within normal limits.    A focused skin examination was performed. All findings within normal limits unless otherwise noted below.    Assessment/Plan     Seborrheic keratosis, Hemangioma of skin, Lentigo, Multiple benign melanocytic nevi  Stuck on verrucous, tan-brown papules and plaques.  Scattered cherry-red papule(s).  Scattered tan macules in sun-exposed areas.  Regular brown macules  Plan:  -Skin exam today notable for the following findings: seborrheic keratosis, lentigines, cherry angiomas, numerous nevi  - ABCDEs of melanoma were discussed and the ugly duckling sign. The appearance of non-melanoma skin cancers was also discussed.  - We recommend daily use of broad spectrum (UVA + UVB protection) sunscreen , with an SPF of 30 or higher. Reapply sunscreen frequently (at least every 2 hours), especially when outdoors or when active, and apply more than you think you need. Waterproof or sweatproof suscreens are important if you will be spending time swimming or doing strenuous activites outside. In addition, we recommend avoidance of mid-day sun, during the hours of 10am to 3pm, and seeking shade when possible. The use of sun protective clothing, such as a wide-brimmed hat, is also recommended.     2.   Encounter for screening for malignant neoplasm of skin and Photoaging of  skin  No suspicious lesions noted on examination today  Mottled pigmentation with telangiectasias and brown reticular macules in sun exposed areas of the body (face, lesser extent on the body).     The risk of chronic, cumulative sun damage and risk of development of skin cancer was reviewed today.   The importance of sun protection was reviewed: including the use of a broad spectrum sunscreen that protects against both UVA/UVB rays, with ingredients such as Zinc oxide or titanium dioxide, wearing sun protective clothing and sun avoidance. We reviewed the warning signs of non-melanoma skin cancer and ABCDEs of melanoma  Please follow up should you notice any new or changing pre-existing skin lesion.    3.   Pruritus, perianal  Hemorrhoids, no active rash     -Likely neuropathic in nature  -Reassured that there was no active inflammatory process and itching was likely due to hemorrhoids  -Recommend OTC hydrocortisone 1% oitnment BID for 2 weeks as needed for itching.    Negin Veliz MD  PGY-4 Dermatology    RTC in 1 year    I saw and evaluated the patient. I personally obtained the key and critical portions of the history and physical exam or was physically present for key and critical portions performed by the resident/fellow. I reviewed the resident/fellow's documentation and discussed the patient with the resident/fellow. I agree with the resident/fellow's medical decision making as documented in the note.    Kerri Pacheco DO

## 2024-02-02 ENCOUNTER — APPOINTMENT (OUTPATIENT)
Dept: DERMATOLOGY | Facility: CLINIC | Age: 74
End: 2024-02-02
Payer: MEDICARE

## 2024-02-07 ENCOUNTER — APPOINTMENT (OUTPATIENT)
Dept: OPHTHALMOLOGY | Facility: CLINIC | Age: 74
End: 2024-02-07
Payer: MEDICARE

## 2024-02-09 ENCOUNTER — PATIENT MESSAGE (OUTPATIENT)
Dept: PRIMARY CARE | Facility: CLINIC | Age: 74
End: 2024-02-09
Payer: MEDICARE

## 2024-02-09 DIAGNOSIS — G36.0 NEUROMYELITIS OPTICA (MULTI): ICD-10-CM

## 2024-02-14 RX ORDER — ALBUTEROL SULFATE 0.83 MG/ML
SOLUTION RESPIRATORY (INHALATION)
Qty: 90 ML | Refills: 0 | Status: SHIPPED | OUTPATIENT
Start: 2024-02-14 | End: 2024-05-09 | Stop reason: WASHOUT

## 2024-02-26 ENCOUNTER — APPOINTMENT (OUTPATIENT)
Dept: OPHTHALMOLOGY | Facility: CLINIC | Age: 74
End: 2024-02-26
Payer: MEDICARE

## 2024-02-27 DIAGNOSIS — D84.821 DRUG-INDUCED IMMUNODEFICIENCY (MULTI): ICD-10-CM

## 2024-02-27 DIAGNOSIS — G36.0 NEUROMYELITIS OPTICA (MULTI): Primary | ICD-10-CM

## 2024-02-27 DIAGNOSIS — Z79.899 DRUG-INDUCED IMMUNODEFICIENCY (MULTI): ICD-10-CM

## 2024-02-27 RX ORDER — ALBUTEROL SULFATE 0.83 MG/ML
3 SOLUTION RESPIRATORY (INHALATION) AS NEEDED
OUTPATIENT
Start: 2024-04-03

## 2024-02-27 RX ORDER — DIPHENHYDRAMINE HYDROCHLORIDE 50 MG/ML
50 INJECTION INTRAMUSCULAR; INTRAVENOUS AS NEEDED
OUTPATIENT
Start: 2024-04-03

## 2024-02-27 RX ORDER — FAMOTIDINE 10 MG/ML
20 INJECTION INTRAVENOUS ONCE AS NEEDED
OUTPATIENT
Start: 2024-04-03

## 2024-02-27 RX ORDER — ACETAMINOPHEN 325 MG/1
650 TABLET ORAL ONCE
Status: CANCELLED | OUTPATIENT
Start: 2024-04-03 | End: 2024-04-03

## 2024-02-27 RX ORDER — EPINEPHRINE 0.3 MG/.3ML
0.3 INJECTION SUBCUTANEOUS EVERY 5 MIN PRN
OUTPATIENT
Start: 2024-04-03

## 2024-02-27 RX ORDER — DIPHENHYDRAMINE HYDROCHLORIDE 50 MG/ML
25 INJECTION INTRAMUSCULAR; INTRAVENOUS ONCE
Status: CANCELLED | OUTPATIENT
Start: 2024-04-03 | End: 2024-04-03

## 2024-03-04 ENCOUNTER — APPOINTMENT (OUTPATIENT)
Dept: NEUROLOGY | Facility: HOSPITAL | Age: 74
End: 2024-03-04
Payer: MEDICARE

## 2024-03-15 ENCOUNTER — TELEPHONE (OUTPATIENT)
Dept: GASTROENTEROLOGY | Facility: HOSPITAL | Age: 74
End: 2024-03-15
Payer: MEDICARE

## 2024-03-15 DIAGNOSIS — Z79.899 IMMUNODEFICIENCY DUE TO DRUG THERAPY (MULTI): Primary | ICD-10-CM

## 2024-03-15 DIAGNOSIS — D84.821 IMMUNODEFICIENCY DUE TO DRUG THERAPY (MULTI): Primary | ICD-10-CM

## 2024-03-20 ENCOUNTER — LAB (OUTPATIENT)
Dept: LAB | Facility: LAB | Age: 74
End: 2024-03-20
Payer: MEDICARE

## 2024-03-20 DIAGNOSIS — G36.0 NEUROMYELITIS OPTICA (MULTI): ICD-10-CM

## 2024-03-20 DIAGNOSIS — Z79.899 IMMUNODEFICIENCY DUE TO DRUG THERAPY (MULTI): ICD-10-CM

## 2024-03-20 DIAGNOSIS — E03.9 HYPOTHYROIDISM, UNSPECIFIED TYPE: ICD-10-CM

## 2024-03-20 DIAGNOSIS — D84.821 IMMUNODEFICIENCY DUE TO DRUG THERAPY (MULTI): ICD-10-CM

## 2024-03-20 DIAGNOSIS — D84.821 DRUG-INDUCED IMMUNODEFICIENCY (MULTI): ICD-10-CM

## 2024-03-20 DIAGNOSIS — R76.8 HEPATITIS B CORE ANTIBODY POSITIVE: ICD-10-CM

## 2024-03-20 DIAGNOSIS — Z86.19 HEPATITIS C VIRUS INFECTION CURED AFTER ANTIVIRAL DRUG THERAPY: Primary | ICD-10-CM

## 2024-03-20 DIAGNOSIS — Z86.19 HEPATITIS C VIRUS INFECTION CURED AFTER ANTIVIRAL DRUG THERAPY: ICD-10-CM

## 2024-03-20 DIAGNOSIS — Z79.899 DRUG-INDUCED IMMUNODEFICIENCY (MULTI): ICD-10-CM

## 2024-03-20 LAB
ALBUMIN SERPL BCP-MCNC: 4.4 G/DL (ref 3.4–5)
ALP SERPL-CCNC: 69 U/L (ref 33–136)
ALT SERPL W P-5'-P-CCNC: 9 U/L (ref 7–45)
AST SERPL W P-5'-P-CCNC: 11 U/L (ref 9–39)
BASOPHILS # BLD AUTO: 0.04 X10*3/UL (ref 0–0.1)
BASOPHILS NFR BLD AUTO: 0.9 %
BILIRUB DIRECT SERPL-MCNC: 0.1 MG/DL (ref 0–0.3)
BILIRUB SERPL-MCNC: 0.5 MG/DL (ref 0–1.2)
EOSINOPHIL # BLD AUTO: 0.14 X10*3/UL (ref 0–0.4)
EOSINOPHIL NFR BLD AUTO: 3.3 %
ERYTHROCYTE [DISTWIDTH] IN BLOOD BY AUTOMATED COUNT: 12.7 % (ref 11.5–14.5)
HBV SURFACE AG SERPL QL IA: NONREACTIVE
HCT VFR BLD AUTO: 45.2 % (ref 36–46)
HGB BLD-MCNC: 14.9 G/DL (ref 12–16)
IGA SERPL-MCNC: 67 MG/DL (ref 70–400)
IGG SERPL-MCNC: 720 MG/DL (ref 700–1600)
IGM SERPL-MCNC: 8 MG/DL (ref 40–230)
IMM GRANULOCYTES # BLD AUTO: 0.01 X10*3/UL (ref 0–0.5)
IMM GRANULOCYTES NFR BLD AUTO: 0.2 % (ref 0–0.9)
LYMPHOCYTES # BLD AUTO: 1.16 X10*3/UL (ref 0.8–3)
LYMPHOCYTES NFR BLD AUTO: 27.5 %
MCH RBC QN AUTO: 30.1 PG (ref 26–34)
MCHC RBC AUTO-ENTMCNC: 33 G/DL (ref 32–36)
MCV RBC AUTO: 91 FL (ref 80–100)
MONOCYTES # BLD AUTO: 0.41 X10*3/UL (ref 0.05–0.8)
MONOCYTES NFR BLD AUTO: 9.7 %
NEUTROPHILS # BLD AUTO: 2.46 X10*3/UL (ref 1.6–5.5)
NEUTROPHILS NFR BLD AUTO: 58.4 %
NRBC BLD-RTO: 0 /100 WBCS (ref 0–0)
PLATELET # BLD AUTO: 287 X10*3/UL (ref 150–450)
PROT SERPL-MCNC: 6.7 G/DL (ref 6.4–8.2)
RBC # BLD AUTO: 4.95 X10*6/UL (ref 4–5.2)
T4 FREE SERPL-MCNC: 1.28 NG/DL (ref 0.78–1.48)
TSH SERPL-ACNC: 0.77 MIU/L (ref 0.44–3.98)
WBC # BLD AUTO: 4.2 X10*3/UL (ref 4.4–11.3)

## 2024-03-20 PROCEDURE — 84443 ASSAY THYROID STIM HORMONE: CPT

## 2024-03-20 PROCEDURE — 88187 FLOWCYTOMETRY/READ 2-8: CPT | Performed by: PSYCHIATRY & NEUROLOGY

## 2024-03-20 PROCEDURE — 87340 HEPATITIS B SURFACE AG IA: CPT

## 2024-03-20 PROCEDURE — 80076 HEPATIC FUNCTION PANEL: CPT

## 2024-03-20 PROCEDURE — 84439 ASSAY OF FREE THYROXINE: CPT

## 2024-03-20 PROCEDURE — 82784 ASSAY IGA/IGD/IGG/IGM EACH: CPT

## 2024-03-20 PROCEDURE — 88184 FLOWCYTOMETRY/ TC 1 MARKER: CPT

## 2024-03-20 PROCEDURE — 36415 COLL VENOUS BLD VENIPUNCTURE: CPT

## 2024-03-20 PROCEDURE — 88185 FLOWCYTOMETRY/TC ADD-ON: CPT

## 2024-03-20 PROCEDURE — 85025 COMPLETE CBC W/AUTO DIFF WBC: CPT

## 2024-03-20 PROCEDURE — 87517 HEPATITIS B DNA QUANT: CPT

## 2024-03-21 ENCOUNTER — OFFICE VISIT (OUTPATIENT)
Dept: NEUROLOGY | Facility: HOSPITAL | Age: 74
End: 2024-03-21
Payer: MEDICARE

## 2024-03-21 VITALS
DIASTOLIC BLOOD PRESSURE: 73 MMHG | HEIGHT: 63 IN | RESPIRATION RATE: 18 BRPM | HEART RATE: 79 BPM | SYSTOLIC BLOOD PRESSURE: 124 MMHG | BODY MASS INDEX: 32.06 KG/M2

## 2024-03-21 DIAGNOSIS — G36.0 NEUROMYELITIS OPTICA (MULTI): Primary | ICD-10-CM

## 2024-03-21 LAB
HBV DNA SERPL NAA+PROBE-ACNC: NOT DETECTED [IU]/ML
HBV DNA SERPL NAA+PROBE-LOG IU: NORMAL {LOG_IU}/ML

## 2024-03-21 PROCEDURE — 99214 OFFICE O/P EST MOD 30 MIN: CPT | Performed by: PSYCHIATRY & NEUROLOGY

## 2024-03-21 PROCEDURE — 3078F DIAST BP <80 MM HG: CPT | Performed by: PSYCHIATRY & NEUROLOGY

## 2024-03-21 PROCEDURE — 1159F MED LIST DOCD IN RCRD: CPT | Performed by: PSYCHIATRY & NEUROLOGY

## 2024-03-21 PROCEDURE — 1157F ADVNC CARE PLAN IN RCRD: CPT | Performed by: PSYCHIATRY & NEUROLOGY

## 2024-03-21 PROCEDURE — 3008F BODY MASS INDEX DOCD: CPT | Performed by: PSYCHIATRY & NEUROLOGY

## 2024-03-21 PROCEDURE — 1036F TOBACCO NON-USER: CPT | Performed by: PSYCHIATRY & NEUROLOGY

## 2024-03-21 PROCEDURE — 3074F SYST BP LT 130 MM HG: CPT | Performed by: PSYCHIATRY & NEUROLOGY

## 2024-03-21 NOTE — PROGRESS NOTES
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 Jaime right RNFL thinning   CSF: done in 10/2017: W 14, P 162, IGG index high, OCBs negative.   JCV: positive in 11/2017, index 3.35  VZV: positive in 11/2017  HEP panel: Hep C PCR positive, Hep B core antibody positive , Hep B S antibody positive, Hep B surface antigen negative (concurrent hep C and B treatment with rituximab).   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      Interval hx:  IgG levels and ALC remain normal on half dose rituximab. No more serious infections (only minor COVID19). Saw dermatology. Never started wellbutrin and continues with fatigue. Has itching on the neck and continued left eye pain.      NMO symptom review:     weakness: yes in 2006, resolved   sensory changes: yes in 2006, persistent in the arms  incoordination: no  falling: no  gait change: yes in 2006, resolved   painful vision loss: yes right posterior optic neuritis in 10/2017. Also reports several episodes of  pain and changed light perception in her congenitally blind left eye since 2006.   double vision: no  vertigo: not currently   facial/bulbar weakness: never   Lhermitte's: yes since 2006  bladder/bowel dysfunction: mild urinary urgency since 2006     spasticity: yes in the legs   tonic spasms: yes rare extensor spasms of the RLE  tremors: no  RLS: not asked   dystonia: yes paroxysmal in the right hand.   other movement disorders: no     heat sensitivity: yes severe  fatigue: yes and excessive daytime sleepiness.   depression: no  anxiety: yes  cognitive changes: no     DMT: rituximab since 1/2018  medication side effects: fatigue  last blood work: 4/30/2018     Vitamin D: taking 19638 units weekly   symptomatic meds: has baclofen for spasms but she rarely takes it.                  Review of Systems  All systems reviewed and are negative except as mentioned in the HPI.        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) (H53.411)   · Cervical spondylosis (721.0) (M47.812)   · Chronic hepatitis C without hepatic coma (070.54) (B18.2)   · Class 1 obesity with body mass index (BMI) of 31.0 to 31.9 in adult (278.00,V85.31)  (E66.9,Z68.31)   · Colon polyp (211.3) (K63.5)   · COVID-19 (079.89) (U07.1)   · Elevated IOP (365.00) (H40.059)   · Exposure to medical therapeutic radiation (E873.3) (Y63.3)   · Former smoker (V15.82) (Z87.891)   · Graves disease (242.00) (E05.00)   · Hepatitis B core antibody positive (795.79) (R76.8)   · Hepatitis C virus infection cured after antiviral drug therapy (V12.09) (Z86.19)   · Hip  pain, right (719.45) (M25.551)   · History of adenomatous polyp of colon (V12.72) (Z86.010)   · Hypothyroidism (244.9) (E03.9)   · Low back pain (724.2) (M54.50)   · Malignant neoplasm of central portion of left breast in female, estrogen receptor positive  (174.1,V86.0) (C50.112,Z17.0)   · Nausea in adult (787.02) (R11.0)   · 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)   · On rituximab therapy (V58.69) (Z79.620)   · Optic neuritis, right (377.30) (H46.9)   · Other visual distortions and entoptic phenomena (368.15) (H53.19)   · Pain of left lower extremity (729.5) (M79.605)   · Pain, upper back (724.5) (M54.9)   · Photopsia of right eye (368.15) (H53.19)   · PVD (posterior vitreous detachment), right eye (379.21) (H43.811)   · Shingles (053.9) (B02.9)   · Status post bilateral breast reduction (V45.89) (Z98.890)   · Steroid responder, bilateral (365.03) (H40.043)   · Thyroid nodule (241.0) (E04.1)   · Transverse myelitis (323.82) (G37.3)   · Vitamin D deficiency (268.9) (E55.9)   · Vitreous degeneration of right eye (379.21) (H43.811)     Past Medical History  Problems    · History of Abdominal pain (789.00) (R10.9)   · History of Abdominal pain (789.00) (R10.9)   · History of Abdominal pain (789.00) (R10.9)   · History of Abnormal finding on breast imaging (793.89) (R92.8)   · Resolved Date: 21 Mar 2022   · History of Abnormal liver function test (790.6) (R79.89)   · History of Abnormal liver function test (790.6) (R79.89)   · Resolved Date: 21 Mar 2022   · Age-related nuclear cataract of right eye (366.16) (H25.11)   · History of Allergic conjunctivitis of both eyes (372.14) (H10.13)   · History of Cervical spondylosis (721.0) (M47.812)   · History of Cervical spondylosis (721.0) (M47.812)   · Resolved Date: 11 Nov 2021   · History of Chronic hepatitis C without hepatic coma (070.54) (B18.2)   · History of Chronic hepatitis C without hepatic coma (070.54) (B18.2)   · History of Chronic hepatitis C without hepatic coma  (070.54) (B18.2)   · History of Chronic hepatitis C without hepatic coma (070.54) (B18.2)   · History of Congenital cataract (743.30) (Q12.0)   · Removed age 5 - Legally blind left eye - eye exam scheduled   · History of Eye pain (379.91) (H57.10)   · Resolved Date: 03 Jul 2014   · History of Hepatitis B core antibody positive (795.79) (R76.8)   · History of Hepatitis B core antibody positive (795.79) (R76.8)   · History of Hepatitis B core antibody positive (795.79) (R76.8)   · History of Hepatitis B core antibody positive (795.79) (R76.8)   · History of abdominal pain (V13.89) (Z87.898)   · Resolved Date: 11 Nov 2021   · History of acute conjunctivitis (V12.49) (Z86.69)   · History of acute conjunctivitis (V12.49) (Z86.69)   · Resolved Date: 11 Nov 2021   · History of cataract (V12.49) (Z86.69)   · Resolved Date: 10 Mar 2014   · h/o congenital cataract left eye, removed age 5. 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)   · Optic neuritis, right (377.30) (H46.9)   · Photopsia of right eye (368.15) (H53.19)   · History of Pupillary abnormality of left eye (364.75) (H21.562)   · History of Relative afferent pupillary defect of right eye (379.49) (H57.09)   · History of Rheumatoid arthritis (714.0) (M06.9)   · History of Transverse myelitis (323.82) (G37.3)   · Resolved Date: 20 Nov 2017   · 4/06 - (C3-4, C4-5)   · History of Vitamin D insufficiency (268.9) (E55.9)   · Resolved Date: 11 Nov 2021     Surgical History  Problems    · History of Ankle Arthrodesis Left   · Triple arthrodesis of the left ankle   ·  History of Bunion Correction By Garcia Procedure   · Bunionectomy of the left foot   · History of Cataract Surgery   · History of Complete Colonoscopy   · 2/07: 5yr f/u - Dr. Cody   · History of Dilation And Curettage   · History of Endometrial Biopsy By Suction   · 1/07 Dr. Haq   · History of Exploratory Laparoscopy   · History of Knee Replacement   · Left Knee replaced      Right knee replaced 2000   · History of Mastectomy partial   · History of Rotator Cuff Repair   · Right rotator cuff repair 6/11   · History of Total Knee Arthroplasty     Family History  Mother    · Family history of Breast Cancer (V16.3)  Father    · Family history of Acute Myocardial Infarction (V17.3)   · Family history of cardiac disorder (V17.49) (Z82.49)  Maternal Grandmother    · Family history of diabetes mellitus (V18.0) (Z83.3)   · Family history of malignant neoplasm (V16.9) (Z80.9)   · Family history of Type 2 diabetes mellitus with other circulatory complication     Social History  Problems    · Caffeine use (V49.89) (Z78.9)   · 2 cups daily   · Former smoker (V15.82) (Z87.891)   · No illicit drug use   · Voodoo Affiliation Faith   · Social alcohol use (V49.89) (Z78.9)   ·  (V61.07) (Z63.4)     Allergies  Medication    · Motrin SUSP   Adverse Reaction; Lips and Face swelling; Swelling; Recorded By: Manny Russell; 12/7/2012 8:11:46 AM   · ibuprofen   Recorded By: Avani Alaniz; 3/26/2021 2:16:50 PM   · Lisinopril TABS   Recorded By: Cate Fuller; 7/3/2014 2:54:07 PM   · Lyrica CAPS   Recorded By: Cate Fuller; 7/3/2014 2:54:07 PM  Denied    · Losartan Potassium TABS   Updated By: Cate Fuller; 8/28/2014 12:40:37 PM      Provider Impressions  Assessment:  This is a 67 year old right handed White female patient, Jahova's witness, with PMH significant for left eye blindness due to congenital cataract, transverse myelitis (3/2006), untreated Hep C, HTN, Grave's disease, and obesity who  presented originally in 10/2017 with acute right optic neuritis resistant to steroids. improved partially with PLEX. The initial neurological exam was positive for central scotoma and red desaturation in the right eye. I have personally reviewed the MRIs which showed a longitudinally-extensive enhancing lesion in the posterior right optic nerve with chiasmal involvement without demyelinating lesions in the brain. cervical MRI showed a non-enhancing short segment demyelinating plaque at C5 unchanged from the initial spine scan from 2006. CSF showed increased lymphocytes, protein, and IGG index but negative OCBs. OCT/VEP consistent with right optic neuritis. She tested negative for Aqp4 (serum and CSF) and anti MOG antibodies. 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 After consulting with hepatology, she was started on rituximab in January 2018 along with treatment for Hep B and Hep C. She's doing well apart from some fluctuation of residual neurological symptoms.      11/14/2018: stable. some mild spasticity in the legs and new headaches. No relapses. tolerating rituximab well.  06/05/2019: stable. mild fluctuation of residual symptoms. Right hip pain so we need to r/o avascular necrosis of the right hip.   12/04/2019: some nighttime headache and diffuse body pains. Will check ESR and CRP. no new relapses.   06/03/2020: Worsening fatigue and mood but declined wellbutrin, amantadine, and sleep study. Says she won't want to take the SARS-COV-2 vaccine when it becomes available. Will check Ig, ALC, and B cell phenotypes today.     11/19/2020: No new relapses. she was recently diagnosed with early breast cancer based on biopsy and is planed for lumpectomy with sentinel LN dissection on 12/2 followed by hormonal and radiotherapy. Her labs from June showed normal IGG level and ALC, and zero B cells. She continues to have morning occipital headaches that improve when she stands up and walks but not  worsening than before and she has had those for years. I offered her a brain MRI to r/o brain or meningeal mets but she declined given stability and chronicity of her headache which is reasonable. The new diagnosis of breast cancer raises a question about the possibility of paraneoplastic NMOSD so I will send the serum autoimmune encephalopathy panel on her especially to look for anti-CV2. This also raises a question about the relation to (and safety of) rituximab. Studies of rituximab therapy in RA and MS showed no increased risk of breast or other cancers but MS studies with the related ocrelizumab (humanized rituximab) showed some increase in breast cancer cases in patients with PPMS. I will check with her breast oncologist regarding their view of rituximab. The patient feels strongly towards staying on rituximab and is more worried about getting an NMOSD attack if she is to come off of it. Of course, if her CV2 antibody comes back positive then we are clearly dealing with a paraneoplastic NMOSD picture and in that case cancer treatment may lead to remission and there will be no need for long-term therapy with rituximab any more.      02/04/2021: No new relapses. she underwent partial mastectomy in December and is planned for radiation and hormonal therapy soon. Her ENS1 panel came back negative including negative CV2 antibody. Her ALC and IgG levels remain normal. Her breast oncologist had no objection to rituximab. she was wondering if she should proceed with radiation therapy and covid vaccination. I explained that she should proceed with radiation and hormonal therapy as recommended by the oncologist as these do not have any known implications on her NMOSD or rituximab therapy. I discussed COVID19 vaccine implications.      08/09/2021: After radiation for breast cancer, she started having multiple symptoms including blurring of vision in the right eye, right eye pain, increased headaches, dizziness (leading  one time to a fall and inability to recover), and worsening body spasms and bladder symptoms. she is concerned that radiation might have triggered an NMOSD relapse. Her last rituximab dose was in January of 2021 and she competed her second dose of the pfizer covid vaccine exactly four weeks ago. She is scheduled for her next rituximab infusion this Friday. OCT shows stable RNFL thickness OD compared to the last value documented by Dr. Jaime (68 compared to 66). Unlikely to be having optic neuritis but will get a brain MRi to evaluate her dizzy episode given her hx of breast cancer and NMOSD. will check spike antibody.      02/07/2022: MRI brain was unremarkable. No new symptoms but has ongoing fatigue. still thinking about going on hormonal therapy for her breast cancer as recommended by her oncologist. She is worried about her IgG trending down althought it is still in the normal range but because she cannot take blood products including IVIG if she is to develop hypogammglobulinemia, she is wondering if we should consider half dose or extended interval between rituximab doses. all are reasonable options for her but only if she develops recurrent infection or critical IgG levels. For now will keep the same regimen. she is interested in joining the new NMOSD registry. She received her third dose of the Pfizer COVID vaccine and wants to check the antibody level again to decide whether or not she'll take the booster (fourth dose for her).      09/13/2022: last blood work showed decrease of IGG to below the lower limit of normal range (680). she also had a prolonged shingles infection in the her left breast and back area. this happened in June four months after her rituximab dose requiring prolonged valtrex course. This has since cleared but she is now interested in decreasing rituximab dose, which is reasonable. reports new left lateral thigh numbness (likely meralgia paraesthetica). wants angelika, which is also  reasonable.      03/14/2023: she was admitted to the hospital in January with complicated COVID19 requiring oxygen therapy. she was found to have COVID19 PNA and was also found to have pneumonitis thought to be related to her radiation therapy or rituximab. she is much better now but is thought to have long COVID19 with persistent cough. Her repeat Ig level in February went down to 440 but her ALC was normal at 2.2. We held her last rituximab dose in February (originally planned to be half dose). she reports some worsening of pre-existing visual and sensory symptoms during all this especially when taking hot showers. will repeat Ig level and if above 500, will resume rituximab at half dose. If still low, we will have to consider IVIG replacement if she allows it from the Islam standpoint. she enrolled in [employer].    10/2/2023: IgG levels improved to normal after decreasing rituximab dose without early B cell repletion. No more serious infections (only minor cold). Complains of fatigue and spontaneous hives. Had many other questions that I answered to the best of my ability. Will continue reduced dose rituximab and will add wellbutrin for fatigue. Will also refer her to dermatology.     3/21/2024: IgG levels and ALC remain normal on half dose rituximab. No more serious infections (only minor COVID19). Saw dermatology. Never started wellbutrin and continues with fatigue. Has itching on the neck and continued left eye pain.         Plan:  - Acute relapse management: not indicated      - DMT: continue rituximab at half dose.      - Will check CBC diff, IgG,and B cell phenotypes with each infusion.      - Symptomatic management: wellbutrin to address fatigue.      - Vitamin D: Continue 05945 units a week.      - Smoking: not smoking currently      - PT/OT: no needs      - Instructions: keep an active life style and develop exercise routine as tolerated. Recommend a balanced healthy diet. Avoid excessive amounts of  salt, carbs, fat, and red meat. Increase intake of fruits, vegetables, and white meat.      - Follow up: in 6 months    Reji Ryan MD       3/21/2024

## 2024-03-21 NOTE — PATIENT INSTRUCTIONS
Your blood work from yesterday was good. Your IGG level stays normal.     I'm glad you are doing relatively well on the half dose of rituximab.     Please continue the same of vitamin D.     Please start wellbutrin for fatigue.     Follow up after six months.     Thank you for visiting the clinic today    Reji Ryan MD

## 2024-03-22 ENCOUNTER — APPOINTMENT (OUTPATIENT)
Dept: OPHTHALMOLOGY | Facility: CLINIC | Age: 74
End: 2024-03-22
Payer: MEDICARE

## 2024-03-25 LAB
CD19 CELLS # BLD: 0 X10E9/L
CD19 CELLS NFR BLD: 0 %
FLOW CYTOMETRY SPECIALIST REVIEW: ABNORMAL
LYMPHOCYTES # SPEC AUTO: 1.16 X10*3/UL
PATH REVIEW, B CELL PHENOTYPING, EXTENDED: ABNORMAL

## 2024-04-02 ENCOUNTER — OFFICE VISIT (OUTPATIENT)
Dept: OPHTHALMOLOGY | Facility: CLINIC | Age: 74
End: 2024-04-02
Payer: MEDICARE

## 2024-04-02 DIAGNOSIS — G36.0 NEUROMYELITIS OPTICA (MULTI): Primary | ICD-10-CM

## 2024-04-02 DIAGNOSIS — G43.109 MIGRAINE AURA WITHOUT HEADACHE (MIGRAINE EQUIVALENTS): ICD-10-CM

## 2024-04-02 DIAGNOSIS — E05.00 GRAVES DISEASE: ICD-10-CM

## 2024-04-02 PROCEDURE — 92133 CPTRZD OPH DX IMG PST SGM ON: CPT | Mod: RIGHT SIDE | Performed by: PSYCHIATRY & NEUROLOGY

## 2024-04-02 PROCEDURE — 92083 EXTENDED VISUAL FIELD XM: CPT | Mod: RIGHT SIDE | Performed by: PSYCHIATRY & NEUROLOGY

## 2024-04-02 PROCEDURE — 99215 OFFICE O/P EST HI 40 MIN: CPT | Performed by: PSYCHIATRY & NEUROLOGY

## 2024-04-02 ASSESSMENT — CONF VISUAL FIELD
OD_SUPERIOR_TEMPORAL_RESTRICTION: 0
OS_SUPERIOR_NASAL_RESTRICTION: 1
OS_INFERIOR_TEMPORAL_RESTRICTION: 1
OD_INFERIOR_NASAL_RESTRICTION: 0
OD_NORMAL: 1
OD_SUPERIOR_NASAL_RESTRICTION: 0
OD_INFERIOR_TEMPORAL_RESTRICTION: 0
OS_INFERIOR_NASAL_RESTRICTION: 1
OS_SUPERIOR_TEMPORAL_RESTRICTION: 1

## 2024-04-02 ASSESSMENT — EXTERNAL EXAM - RIGHT EYE: OD_EXAM: NORMAL

## 2024-04-02 ASSESSMENT — ENCOUNTER SYMPTOMS
RESPIRATORY NEGATIVE: 0
ENDOCRINE NEGATIVE: 0
HEMATOLOGIC/LYMPHATIC NEGATIVE: 0
ALLERGIC/IMMUNOLOGIC NEGATIVE: 0
CARDIOVASCULAR NEGATIVE: 0
GASTROINTESTINAL NEGATIVE: 0
NEUROLOGICAL NEGATIVE: 0
MUSCULOSKELETAL NEGATIVE: 0
CONSTITUTIONAL NEGATIVE: 0
PSYCHIATRIC NEGATIVE: 0
EYES NEGATIVE: 0

## 2024-04-02 ASSESSMENT — REFRACTION_WEARINGRX
OD_AXIS: 070
OS_AXIS: 085
OD_ADD: +2.50
OS_SPHERE: PLANO
OD_CYLINDER: -5.00
OD_SPHERE: +1.00
OS_CYLINDER: -4.25
OS_ADD: +2.50

## 2024-04-02 ASSESSMENT — PACHYMETRY
OS_CT(UM): 580
OD_CT(UM): 589

## 2024-04-02 ASSESSMENT — VISUAL ACUITY
OD_CC: 20/25
OS_CC: 20/HM
CORRECTION_TYPE: GLASSES
METHOD: SNELLEN - LINEAR

## 2024-04-02 ASSESSMENT — SLIT LAMP EXAM - LIDS
COMMENTS: NORMAL
COMMENTS: NORMAL

## 2024-04-02 ASSESSMENT — EXTERNAL EXAM - LEFT EYE: OS_EXAM: NORMAL

## 2024-04-02 ASSESSMENT — TONOMETRY
OD_IOP_MMHG: 14
OS_IOP_MMHG: 14
IOP_METHOD: GOLDMANN APPLANATION

## 2024-04-02 ASSESSMENT — CUP TO DISC RATIO: OD_RATIO: 0.4

## 2024-04-02 NOTE — PROGRESS NOTES
"Assessment and Plan    08/17/2021 Rashad-Hallpike negative bilateral.    11/10/2014 height 5`3\" & weight 228 pounds for BMI 40.    03/21/2018 MRI brain & orbits with contrast, which I personally reviewed previously, shows stable non-specific subcortical white matter changes on FLAIR.  10/12/2017 MRI cervical & thoracic spine, by report, shows “There is abnormal increased intramedullary signal on the T2 weighted images noted within the cervical cord centered posteriorly along the midline at the C5 level. There is the suggestion of mild cervical cord volume loss at this level. There is no corresponding abnormal enhancement on the postcontrast images. Considerations could include a focal area of myelomalacia or conceivably sequelae of a demyelinating process.”  10/10/2017 MRI brain & orbits with contrast, which I personally reviewed previously, shows right > left enhancement of the optic nerves near the orbital apices along with increased STIR signal. There is a left lentiform nucleus FLAIR hyperintense lesion likely lacunar infarction along with periventricular white matter likely consistent with ischemia.    10/27/2017 lumbar puncture tube 1: RBC 34851, WBC 51 (78% N, 17% L), tube 4: RBC 9000, WBC 14 (91%, N, 9% L, 6% M), protein 162, glucose 82. Elevated IgG synthesis, but complicated by elevated CSF albumin. Oligoclonal bands negative.    11/23/2020 autoimmune encephalopathy panel negative.  12/04/2019 ESR 18. CRP 0.25 mg/dL.  11/14/2018 ESR 19. CRP 0.49 mg/dL. NMO, MOG negative.  08/15/2018 B12 352.  11/22/2017 JCV positive.  10/25/2017 NMO antibody negative. MOG IgG negative. Anti-MAG negative. Anti-SGPG negative.  10/11/2017 B12 455. Quantiferon negative.  10/10/2017 ESR 10, CRP 0.31, syphilis IgG NR.    04/02/2024 OCT RNFL OD 65. (stable)  09/19/2023 OCT RNFL OD 66. (stable)  03/24/2023 OCT RNFL OD 66. (stable)  09/23/2022 OCT RNFL OD 65. (stable)  03/18/2022 OCT RNFL OD 64. (stalbe)  08/17/2021 OCT RNFL OD 66. " (stable)  03/26/2021 OCT RNFL OD 66. (stable)  06/19/2020 OCT RNFL OD 66. (stable)  12/30/2019 OCT RNFL OD 65. (stable)  06/21/2019 OCT RNFL OD 68. (stable)  12/21/2018 OCT RNFL OD 68.  07/17/2018 OCT RNFL OD 70 with T & borderline N thinning.  04/20/2018 OCT RNFL OD 68 with T & borderline N & I thinning.  02/23/2018 OCT RNFL OD 63 with falsely low algorithm failure nasally.  12/07/2017 OCT RNFL OD 83.  11/10/2017 OCT RNFL OD 89.  10/24/2017 OCT RNFL OD 84.  10/09/2017 OCT RNFL OD 85.  OCT macula OD normal contour 262.  10/03/2017 OCT RNFL OD 85.  OCT macula OD normal contour 270.    04/02/2024 HVF 24-2 OD fovea 32, scatter MD -0.39.  09/19/2023 HVF 24-2 OD fovea 32, wnl MD -1.13.  03/24/2023 HVF 24-2 OD fovea 30, scatter MD -1.02.  09/23/2022 HVF 24-2 OD fovea 31, wnl MD -0.49.  03/18/2022 HVF 24-2 OD fovea 32, scatter MD -0.10.  08/17/2021 HVF 24-2 OD fovea 31, possible enlarged blind spot versus scatter MD -0.89.  03/26/2021 HVF 24-2 OD fovea 31, wnl MD +0.91.  06/19/2020 HVF 24-2 OD fovea 32, wnl MD +0.64.  12/30/2019 HVF 24-2 OD fovea 34, wnl MD +0.14.  06/21/2019 HVF 24-2 OD fovea 32, wnl MD +0.23.  12/21/2018 HVF 24-2 OD fovea 32, wnl MD +0.41.  07/17/2018 HVF 24-2 OD fovea 35, wnl MD -0.23.  04/20/2018 HVF 24-2 OD fovea 31, wnl MD -1.01.  02/23/2018 HVF 24-2 OD fovea 29, wnl MD -1.81.  12/07/2017 HVF 24-2 OD wnl, fovea 27 dB MD -0.99.  11/10/2017 HVF 24-2 OD FP 0%, FN 1%, central scotoma, fovea 28, MD 2.83.  11/02/2017 HVF 24-2 OD FP 5%, FN 0%, central scotoma, fovea 13, MD -6.18.  10/24/2017 HVF 24-2 FP 3%, FN 8%, cecocentral scotoma.  10/09/2017 HVF 30-2 FP 11%, FN N/A, generalized depression with relatively preserved central island MD -26.58.    This 73 year-old woman, Jehovah`s witness, with a history of neuromyelitis optica spectrum disease, congenital cataract OS with blindness OS, transverse myelitis 3/2006, RA, hepatitis B & C, HTN, Graves / hypothyroidism, obesity, former smoker presents in follow  up for right optic neuritis.    She has stable examination including Lee visual field (HVF) and OCT retinal nerve fiber layer consistent with sustained control of neuromyelitis optica spectrum disease.    Her vision episodes are consistent with late life migraine accompaniment. I doubt stroke or seizure given positive symptoms     Thyroid eye disease remains quiescent.    She does have abnormal sleep with daytime sleepiness, but she does not want to pursue sleep study now.    Plan    Conitnue rituximab with dosing adjustment with Dr. Reji Ryan.  Refractive care with optometry.  Defer sleep study.    Follow up in 6 months with HVF & OCT. (dilation 4/2/2024)

## 2024-04-03 ENCOUNTER — APPOINTMENT (OUTPATIENT)
Dept: HEMATOLOGY/ONCOLOGY | Facility: CLINIC | Age: 74
End: 2024-04-03
Payer: MEDICARE

## 2024-04-05 ENCOUNTER — INFUSION (OUTPATIENT)
Dept: HEMATOLOGY/ONCOLOGY | Facility: CLINIC | Age: 74
End: 2024-04-05
Payer: MEDICARE

## 2024-04-05 VITALS
BODY MASS INDEX: 31.83 KG/M2 | HEART RATE: 73 BPM | OXYGEN SATURATION: 97 % | WEIGHT: 179.68 LBS | TEMPERATURE: 98.6 F | DIASTOLIC BLOOD PRESSURE: 73 MMHG | RESPIRATION RATE: 18 BRPM | SYSTOLIC BLOOD PRESSURE: 145 MMHG

## 2024-04-05 DIAGNOSIS — Z79.899 DRUG-INDUCED IMMUNODEFICIENCY (MULTI): ICD-10-CM

## 2024-04-05 DIAGNOSIS — G36.0 NEUROMYELITIS OPTICA (MULTI): ICD-10-CM

## 2024-04-05 DIAGNOSIS — D84.821 DRUG-INDUCED IMMUNODEFICIENCY (MULTI): ICD-10-CM

## 2024-04-05 PROCEDURE — 2500000004 HC RX 250 GENERAL PHARMACY W/ HCPCS (ALT 636 FOR OP/ED): Performed by: PSYCHIATRY & NEUROLOGY

## 2024-04-05 PROCEDURE — 96415 CHEMO IV INFUSION ADDL HR: CPT

## 2024-04-05 PROCEDURE — 96375 TX/PRO/DX INJ NEW DRUG ADDON: CPT | Mod: INF

## 2024-04-05 PROCEDURE — 96413 CHEMO IV INFUSION 1 HR: CPT

## 2024-04-05 RX ORDER — ACETAMINOPHEN 325 MG/1
650 TABLET ORAL ONCE
OUTPATIENT
Start: 2024-10-02 | End: 2024-10-02

## 2024-04-05 RX ORDER — ALBUTEROL SULFATE 0.83 MG/ML
3 SOLUTION RESPIRATORY (INHALATION) AS NEEDED
OUTPATIENT
Start: 2024-10-02

## 2024-04-05 RX ORDER — EPINEPHRINE 0.3 MG/.3ML
0.3 INJECTION SUBCUTANEOUS EVERY 5 MIN PRN
OUTPATIENT
Start: 2024-10-02

## 2024-04-05 RX ORDER — DIPHENHYDRAMINE HYDROCHLORIDE 50 MG/ML
25 INJECTION INTRAMUSCULAR; INTRAVENOUS ONCE
Status: COMPLETED | OUTPATIENT
Start: 2024-04-05 | End: 2024-04-05

## 2024-04-05 RX ORDER — FAMOTIDINE 10 MG/ML
20 INJECTION INTRAVENOUS ONCE AS NEEDED
OUTPATIENT
Start: 2024-10-02

## 2024-04-05 RX ORDER — ACETAMINOPHEN 325 MG/1
650 TABLET ORAL ONCE
Status: COMPLETED | OUTPATIENT
Start: 2024-04-05 | End: 2024-04-05

## 2024-04-05 RX ORDER — DIPHENHYDRAMINE HYDROCHLORIDE 50 MG/ML
50 INJECTION INTRAMUSCULAR; INTRAVENOUS AS NEEDED
OUTPATIENT
Start: 2024-10-02

## 2024-04-05 RX ORDER — DIPHENHYDRAMINE HYDROCHLORIDE 50 MG/ML
25 INJECTION INTRAMUSCULAR; INTRAVENOUS ONCE
OUTPATIENT
Start: 2024-10-02 | End: 2024-10-02

## 2024-04-05 RX ADMIN — DIPHENHYDRAMINE HYDROCHLORIDE 25 MG: 50 INJECTION, SOLUTION INTRAMUSCULAR; INTRAVENOUS at 10:15

## 2024-04-05 RX ADMIN — ACETAMINOPHEN 650 MG: 325 TABLET ORAL at 10:38

## 2024-04-05 RX ADMIN — RITUXIMAB 1000 MG: 10 INJECTION, SOLUTION INTRAVENOUS at 10:58

## 2024-04-05 RX ADMIN — METHYLPREDNISOLONE SODIUM SUCCINATE 100 MG: 125 INJECTION, POWDER, FOR SOLUTION INTRAMUSCULAR; INTRAVENOUS at 10:15

## 2024-04-05 ASSESSMENT — PAIN SCALES - GENERAL: PAINLEVEL: 0-NO PAIN

## 2024-04-16 ENCOUNTER — SPECIALTY PHARMACY (OUTPATIENT)
Dept: PHARMACY | Facility: CLINIC | Age: 74
End: 2024-04-16

## 2024-04-16 DIAGNOSIS — E03.9 HYPOTHYROIDISM, UNSPECIFIED: ICD-10-CM

## 2024-04-16 PROCEDURE — RXMED WILLOW AMBULATORY MEDICATION CHARGE

## 2024-04-17 ENCOUNTER — PHARMACY VISIT (OUTPATIENT)
Dept: PHARMACY | Facility: CLINIC | Age: 74
End: 2024-04-17
Payer: COMMERCIAL

## 2024-04-17 RX ORDER — LEVOTHYROXINE SODIUM 100 UG/1
TABLET ORAL
Qty: 90 TABLET | Refills: 0 | Status: SHIPPED | OUTPATIENT
Start: 2024-04-17 | End: 2024-05-09 | Stop reason: SDUPTHER

## 2024-04-21 DIAGNOSIS — E55.9 VITAMIN D DEFICIENCY, UNSPECIFIED: ICD-10-CM

## 2024-04-22 RX ORDER — ASPIRIN 325 MG
50000 TABLET, DELAYED RELEASE (ENTERIC COATED) ORAL
Qty: 12 CAPSULE | Refills: 3 | Status: SHIPPED | OUTPATIENT
Start: 2024-04-28

## 2024-05-08 PROBLEM — R35.0 URINARY FREQUENCY: Status: ACTIVE | Noted: 2024-04-11

## 2024-05-08 PROBLEM — N39.41 URGE URINARY INCONTINENCE: Status: ACTIVE | Noted: 2024-04-11

## 2024-05-08 PROBLEM — N81.11 CYSTOCELE, MIDLINE: Status: ACTIVE | Noted: 2024-04-11

## 2024-05-08 PROBLEM — N81.2 INCOMPLETE UTEROVAGINAL PROLAPSE: Status: ACTIVE | Noted: 2023-12-25

## 2024-05-08 PROBLEM — N81.6 RECTOCELE: Status: ACTIVE | Noted: 2024-04-11

## 2024-05-08 PROBLEM — K59.9 BOWEL DYSFUNCTION: Status: ACTIVE | Noted: 2024-04-11

## 2024-05-08 PROBLEM — M62.81 MUSCLE WEAKNESS: Status: ACTIVE | Noted: 2024-04-11

## 2024-05-08 PROBLEM — N18.6 END STAGE RENAL DISEASE (MULTI): Status: ACTIVE | Noted: 2024-02-28

## 2024-05-08 PROBLEM — N32.81 OAB (OVERACTIVE BLADDER): Status: ACTIVE | Noted: 2024-04-11

## 2024-05-08 PROBLEM — R39.15 URINARY URGENCY: Status: ACTIVE | Noted: 2024-04-11

## 2024-05-09 ENCOUNTER — OFFICE VISIT (OUTPATIENT)
Dept: PRIMARY CARE | Facility: CLINIC | Age: 74
End: 2024-05-09
Payer: MEDICARE

## 2024-05-09 VITALS
BODY MASS INDEX: 32.43 KG/M2 | DIASTOLIC BLOOD PRESSURE: 66 MMHG | SYSTOLIC BLOOD PRESSURE: 112 MMHG | WEIGHT: 183 LBS | HEIGHT: 63 IN | HEART RATE: 82 BPM | TEMPERATURE: 97.6 F

## 2024-05-09 DIAGNOSIS — J98.01 BRONCHOSPASM: ICD-10-CM

## 2024-05-09 DIAGNOSIS — E04.1 THYROID NODULE: ICD-10-CM

## 2024-05-09 DIAGNOSIS — E03.9 HYPOTHYROIDISM, UNSPECIFIED: ICD-10-CM

## 2024-05-09 DIAGNOSIS — G36.0 NEUROMYELITIS OPTICA (MULTI): ICD-10-CM

## 2024-05-09 DIAGNOSIS — I10 BENIGN ESSENTIAL HYPERTENSION: Primary | ICD-10-CM

## 2024-05-09 PROBLEM — B02.9 SHINGLES: Status: RESOLVED | Noted: 2023-08-19 | Resolved: 2024-05-09

## 2024-05-09 PROBLEM — N18.6 END STAGE RENAL DISEASE (MULTI): Status: RESOLVED | Noted: 2024-02-28 | Resolved: 2024-05-09

## 2024-05-09 PROCEDURE — 99214 OFFICE O/P EST MOD 30 MIN: CPT | Performed by: FAMILY MEDICINE

## 2024-05-09 PROCEDURE — 1036F TOBACCO NON-USER: CPT | Performed by: FAMILY MEDICINE

## 2024-05-09 PROCEDURE — 3074F SYST BP LT 130 MM HG: CPT | Performed by: FAMILY MEDICINE

## 2024-05-09 PROCEDURE — 3008F BODY MASS INDEX DOCD: CPT | Performed by: FAMILY MEDICINE

## 2024-05-09 PROCEDURE — 1159F MED LIST DOCD IN RCRD: CPT | Performed by: FAMILY MEDICINE

## 2024-05-09 PROCEDURE — 1160F RVW MEDS BY RX/DR IN RCRD: CPT | Performed by: FAMILY MEDICINE

## 2024-05-09 PROCEDURE — 3078F DIAST BP <80 MM HG: CPT | Performed by: FAMILY MEDICINE

## 2024-05-09 PROCEDURE — 1157F ADVNC CARE PLAN IN RCRD: CPT | Performed by: FAMILY MEDICINE

## 2024-05-09 RX ORDER — LEVOTHYROXINE SODIUM 100 UG/1
TABLET ORAL
Qty: 90 TABLET | Refills: 3 | Status: SHIPPED | OUTPATIENT
Start: 2024-05-09

## 2024-05-09 RX ORDER — ALBUTEROL SULFATE 0.83 MG/ML
2.5 SOLUTION RESPIRATORY (INHALATION) 4 TIMES DAILY PRN
Start: 2024-05-09 | End: 2025-05-09

## 2024-05-09 RX ORDER — VALSARTAN 40 MG/1
20 TABLET ORAL DAILY
Qty: 45 TABLET | Refills: 3 | Status: SHIPPED | OUTPATIENT
Start: 2024-05-09 | End: 2025-05-09

## 2024-05-09 ASSESSMENT — PATIENT HEALTH QUESTIONNAIRE - PHQ9
2. FEELING DOWN, DEPRESSED OR HOPELESS: NOT AT ALL
SUM OF ALL RESPONSES TO PHQ9 QUESTIONS 1 AND 2: 0
1. LITTLE INTEREST OR PLEASURE IN DOING THINGS: NOT AT ALL

## 2024-05-09 NOTE — PROGRESS NOTES
"    /66   Pulse 82   Temp 36.4 °C (97.6 °F)   Ht 1.6 m (5' 3\")   Wt 83 kg (183 lb)   BMI 32.42 kg/m²     Past Medical History:   Diagnosis Date    Acute atopic conjunctivitis, bilateral 05/09/2018    Allergic conjunctivitis of both eyes    Acute transverse myelitis in demyelinating disease of central nervous system (Multi) 11/20/2017    Transverse myelitis    Age-related nuclear cataract, right eye 12/20/2022    Age-related nuclear cataract of right eye    Breast cancer (Multi) 2021    Left    Chronic viral hepatitis C (Multi) 02/23/2018    Chronic hepatitis C without hepatic coma    Chronic viral hepatitis C (Multi) 04/20/2018    Chronic hepatitis C without hepatic coma    Chronic viral hepatitis C (Multi) 05/25/2018    Chronic hepatitis C without hepatic coma    Chronic viral hepatitis C (Multi) 07/17/2018    Chronic hepatitis C without hepatic coma    Congenital cataract     Congenital cataract    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    Neuromyelitis optica    Ocular pain, unspecified eye 03/10/2014    Eye pain    Other abnormal and inconclusive findings on diagnostic imaging of breast 11/03/2020    Abnormal finding on breast imaging    Other anomalies of pupillary function 06/21/2019    Relative afferent pupillary defect of right eye    Other microscopic hematuria     Microscopic hematuria    Other specified abnormal findings of blood chemistry     Abnormal liver function test    Other specified abnormal findings of blood chemistry 07/17/2018    Abnormal liver function test    Other specified abnormal immunological findings in serum 02/23/2018    Hepatitis B core antibody positive    Other specified abnormal immunological findings in serum 04/20/2018    Hepatitis B core antibody positive    Other specified abnormal immunological findings in serum 05/25/2018    Hepatitis B core antibody positive    Other specified abnormal immunological findings in serum 07/17/2018    Hepatitis B core " antibody positive    Other subjective visual disturbances 12/20/2022    Photopsia of right eye    Personal history of irradiation 2021    Left breast    Personal history of other diseases of the circulatory system     History of hypertension    Personal history of other diseases of the nervous system and sense organs 05/09/2018    History of acute conjunctivitis    Personal history of other diseases of the nervous system and sense organs 12/20/2022    History of acute conjunctivitis    Personal history of other diseases of the nervous system and sense organs 02/22/2013    History of cataract    Personal history of other endocrine, nutritional and metabolic disease     History of Graves' disease    Personal history of other infectious and parasitic diseases 11/13/2018    History of hepatitis C virus infection    Personal history of other specified conditions     History of abdominal pain    Personal history of other specified conditions 12/04/2019    History of headache    Pupillary abnormality, left eye 10/03/2017    Pupillary abnormality of left eye    Rheumatoid arthritis, unspecified (Multi)     Rheumatoid arthritis    Spondylosis without myelopathy or radiculopathy, cervical region     Cervical spondylosis    Spondylosis without myelopathy or radiculopathy, cervical region 07/03/2014    Cervical spondylosis    Unspecified abdominal pain 04/20/2018    Abdominal pain    Unspecified abdominal pain 05/25/2018    Abdominal pain    Unspecified abdominal pain 07/17/2018    Abdominal pain    Unspecified optic neuritis 12/20/2022    Optic neuritis, right    Vitamin D deficiency, unspecified 07/17/2018    Vitamin D insufficiency       Patient Active Problem List   Diagnosis    Age-related nuclear cataract of right eye    Benign essential hypertension    Bilateral dry eyes    Bilateral keratitis    Blepharitis of both upper and lower eyelid of left eye    Central scotoma, right eye    Cervical spondylosis    Chronic  hepatitis C without hepatic coma (Multi)    Colon polyp    Conjunctival concretions of right eye    Dermatitis of face    Elevated IOP    Exposure to medical therapeutic radiation    Graves disease    Hepatitis B core antibody positive    Hepatitis C virus infection cured after antiviral drug therapy    Hip pain, right    Neuromyelitis optica (Multi)    On rituximab therapy    Pain, upper back    Photopsia of right eye    PVD (posterior vitreous detachment), right eye    Shingles    Vitamin D deficiency    Hypothyroidism    Steroid responder, bilateral    Status post bilateral breast reduction    History of adenomatous polyp of colon    Drug-induced immunodeficiency (Multi)    Acute disseminated demyelination, unspecified (Multi)    Telogen effluvium    Transverse myelitis (Multi)    Discomfort of right ear    Class 1 obesity due to excess calories with serious comorbidity and body mass index (BMI) of 32.0 to 32.9 in adult    Incomplete uterovaginal prolapse    Bowel dysfunction    Cystocele, midline    End stage renal disease (Multi)    Muscle weakness    OAB (overactive bladder)    Rectocele    Urge urinary incontinence    Urinary frequency    Urinary urgency       Current Outpatient Medications   Medication Sig Dispense Refill    albuterol 2.5 mg /3 mL (0.083 %) nebulizer solution INHALE 1 (ONE) vial (3 mL) via nebulizer FOUR TIMES DAILY 90 mL 0    albuterol 90 mcg/actuation inhaler Inhale 2 puffs every 6 hours if needed.      artificial tears, dextran-hypomel-glycerin, 0.1-0.3-0.2 % ophthalmic solution Administer 2 drops into the left eye every 4 hours.      aspirin 325 mg tablet Take 1 tablet (325 mg) by mouth once daily as needed for headaches or moderate pain (4 - 6).      buPROPion (Wellbutrin) 75 mg tablet Take 1 tablet (75 mg) by mouth early in the morning.. 30 tablet 11    calcium carb/vitamin D3/vit K1 (VIACTIV ORAL) Take by mouth once daily.      cholecalciferol (Vitamin D-3) 50,000 unit capsule TAKE 1  CAPSULE BY MOUTH once a week 12 capsule 3    entecavir (Baraclude) 0.5 mg tablet TAKE ONE (1) TABLET BY MOUTH DAILY 1 OR 2 HOURS BEFORE A MEAL. 90 tablet 3    ketoconazole (NIZOral) 2 % shampoo Apply topically. ply 1 Application daily topically. Lather on to scalp, let sit for 2-5 minutes. Rinse thoroughly. Use 2-3 times per week.      riTUXimab (Rituxan) 10 mg/mL injection Infuse 100 mL (1,000 mg total) into a venous catheter every 6 months.      Synthroid 100 mcg tablet TAKE 1 TABLET FOR 6 DAYS a week and 1 day off 90 tablet 0    triamcinolone (Kenalog) 0.1 % cream Apply to itchy areas (avoid face, skin folds) 2x daily for up to 2 weeks 454 g 1    valsartan (Diovan) 40 mg tablet TAKE 1/2 (ONE-HALF) OF A TABLET BY MOUTH DAILY 45 tablet 0     No current facility-administered medications for this visit.       CC/HPI/ASSESSMENT/PLAN    CC fall medicine    HPI patient with a history of hypertension hypothyroidism neuromyelitis optica transverse myelitis.  Medication list reviewed.  She gets Rituxan treatments every 6 months.  She notes she had COVID a few months ago.  Patient's better.  She does use albuterol for bronchospasm intermittently since she has had COVID 2 years ago.  Blood pressure is under good control.  Requesting refills for medication.  She takes Synthroid and requesting refills for Synthroid as well.  She was prescribed Wellbutrin but has not started it yet.  Denies fever chills short of breath abdominal pain blood in urine or stool patient notes she continues to experience discomfort in the right lateral rib cage area with certain bending movements.  Patient's been scanned in the past which showed no intrathoracic pathology.  ROS negative some note above past medical social history reviewed    Exam calm vital stable neck supple lungs CTA CV RRR Ext no edema    A/P 1 hypertension stable 2 hypothyroidism chronic stable 3 thyroid nodule.  Ultrasound of thyroid is ordered last ultrasound was 7 months ago,  radiology recommended follow-up.  Medications are refilled.  #4 neuromyelitis optica, she will continue Rituxan treatments.  Thyroid levels from March 2024 normal.  Follow-up 4 months    There are no diagnoses linked to this encounter.

## 2024-06-05 ENCOUNTER — OFFICE VISIT (OUTPATIENT)
Dept: OPHTHALMOLOGY | Facility: CLINIC | Age: 74
End: 2024-06-05
Payer: MEDICARE

## 2024-06-05 DIAGNOSIS — H01.00B BLEPHARITIS OF BOTH UPPER AND LOWER EYELID OF LEFT EYE, UNSPECIFIED TYPE: Primary | ICD-10-CM

## 2024-06-05 DIAGNOSIS — H04.123 BILATERAL DRY EYES: ICD-10-CM

## 2024-06-05 PROCEDURE — 83861 MICROFLUID ANALY TEARS: CPT | Performed by: OPTOMETRIST

## 2024-06-05 PROCEDURE — 83516 IMMUNOASSAY NONANTIBODY: CPT | Performed by: OPTOMETRIST

## 2024-06-05 PROCEDURE — 92012 INTRM OPH EXAM EST PATIENT: CPT | Performed by: OPTOMETRIST

## 2024-06-05 PROCEDURE — 92285 EXTERNAL OCULAR PHOTOGRAPHY: CPT | Performed by: OPTOMETRIST

## 2024-06-05 RX ORDER — FLUOROMETHOLONE 1 MG/ML
1 SUSPENSION/ DROPS OPHTHALMIC 4 TIMES DAILY
Qty: 5 ML | Refills: 1 | Status: SHIPPED | OUTPATIENT
Start: 2024-06-05 | End: 2024-06-15

## 2024-06-05 ASSESSMENT — SLIT LAMP EXAM - LIDS
COMMENTS: NORMAL
COMMENTS: NORMAL

## 2024-06-05 ASSESSMENT — ENCOUNTER SYMPTOMS
RESPIRATORY NEGATIVE: 0
CONSTITUTIONAL NEGATIVE: 0
EYES NEGATIVE: 0
PSYCHIATRIC NEGATIVE: 0
ALLERGIC/IMMUNOLOGIC NEGATIVE: 1
MUSCULOSKELETAL NEGATIVE: 0
ENDOCRINE NEGATIVE: 0
NEUROLOGICAL NEGATIVE: 0
HEMATOLOGIC/LYMPHATIC NEGATIVE: 0
CARDIOVASCULAR NEGATIVE: 0
GASTROINTESTINAL NEGATIVE: 0

## 2024-06-05 ASSESSMENT — VISUAL ACUITY
OD_CC: 20/25
METHOD: SNELLEN - LINEAR
OD_CC+: +2
OS_CC: LP
CORRECTION_TYPE: GLASSES

## 2024-06-05 ASSESSMENT — EXTERNAL EXAM - RIGHT EYE: OD_EXAM: NORMAL

## 2024-06-05 ASSESSMENT — SCHIRMERS TEST: OS_SCHIRMERS: 28+

## 2024-06-05 ASSESSMENT — EXTERNAL EXAM - LEFT EYE: OS_EXAM: NORMAL

## 2024-06-05 ASSESSMENT — PACHYMETRY
OD_CT(UM): 589
OS_CT(UM): 580

## 2024-06-05 ASSESSMENT — CUP TO DISC RATIO: OD_RATIO: 0.4

## 2024-06-05 NOTE — PROGRESS NOTES
72 yo F, EP 7 month Dry eye fuv, Using Sooth XP/Refresh qid OU, wasn't able to find iVizia, Tried WCs, but anything tried seems to make eyes more irritated, hasn't tried again in a few weeks. Had swelling after using lid scrubs and was unable to find the recommended brand. Constant pain OS, Overall dry symptoms haven't really changed.     Testing to evaluate the presence of dry eye disease (DED) was performed today and provided the following results:  The ocular surface disease index questionnaire (OSDI) score was  (scale: 0-12 = normal, 13-22 = mild, 23-32 = moderate, >33 = severe): 40  TearLab, a test for tear film osmolarity revealed (normal <300 mOsm/L, mild 300-320, moderate 320-340, severe >340 mOsm/L, inter eye difference of >8 mOsm/L is considered abnormal as well)  OD:  308 mOsm/L  OS:  289 mOsm/L  Inflammadry, a test for the DED specific MMP-9 inhibitor:  OD:  positive  OS:  positive  Schirmer's test with anesthetic, testing basal tear production (0-5 = severe, 6-10 = moderate, 11-15 = mild):  OD:  23 mm between 1-6 minutes  OS:  28 mm between 1-6 minutes  Tear break up time (TBUT), a measure of tear stability (0-5 = severe, 6-10 = moderate, 11-15 = mild)  OD:  4 seconds  OS:  5 seconds  bleb creates a bubble.   Corneal punctate epithelial erosions (PEE) staining with sodium fluorescein score (5 zones, each PEE level 0-3, maximum score = 15)  OD: NIH PEE score:  0 Central PEE absent  OS: NIH PEE score:  0 Central PEE absent     Meibomain gland disease (Effron scale 0-4, 0:no abnormality, 4: thick creamy yellow expression at all gland orifices, expression continuous, conjunctival redness)  Right eye (OD): stage+3 trace collarettes  Left eye (OS): stage +3 trace collarettes    Meibography was completed and degree of meibomian glands dropout was (Pult scale scale: degree 0 to 4 with 0: indicating 0%, 1: <26%, 2: 26-50%, 3: 51-75% and 4: >75% meibomian gland dropout):  Right upper lid: 1  Right lower  lid:1  Left upper lid:1  Left lower lid: 1    a history of neuromyelitis optica spectrum disease OU, congenital cataract OS with blindness OS, with Hx of cataract removal with peaked pupil, strabismus surgery OS after, transverse myelitis 3/2006, RA, hepatitis B & C, HTN, Graves / hypothyroidism, obesity, former smoker presents in follow up for right optic neuritis.     Onset of eye pain uncertain at least since 2014. OS>>OD.     Symmetrical eyelid findings and only finding OS is conjunctival bleb with bubble with each blink. MGD present with minimal gland loss. No asymmetry. Will recommend a course of FML gtt OS 4x/day x 1 week then BID after and RTC 1 month for follow up. No need to do dry eye testing then. Start using Brudermask BID. There is a possibility of neuropathic dry eye left eye (post multiple surgeries in the past). There is a possibility that the bubble formed on the edge of the bleb is the problem. There is no staining of the surface in this area however.      If steroid provides relief then recommend XIIDRA or cyclosporin.

## 2024-06-06 ENCOUNTER — APPOINTMENT (OUTPATIENT)
Dept: PRIMARY CARE | Facility: CLINIC | Age: 74
End: 2024-06-06
Payer: MEDICARE

## 2024-06-13 ENCOUNTER — SPECIALTY PHARMACY (OUTPATIENT)
Dept: PHARMACY | Facility: CLINIC | Age: 74
End: 2024-06-13

## 2024-06-14 ENCOUNTER — SPECIALTY PHARMACY (OUTPATIENT)
Dept: PHARMACY | Facility: CLINIC | Age: 74
End: 2024-06-14

## 2024-06-18 ENCOUNTER — HOSPITAL ENCOUNTER (OUTPATIENT)
Dept: RADIOLOGY | Facility: HOSPITAL | Age: 74
Discharge: HOME | End: 2024-06-18
Payer: MEDICARE

## 2024-06-18 DIAGNOSIS — E04.1 THYROID NODULE: ICD-10-CM

## 2024-06-18 PROCEDURE — 76536 US EXAM OF HEAD AND NECK: CPT

## 2024-06-18 PROCEDURE — 76536 US EXAM OF HEAD AND NECK: CPT | Performed by: RADIOLOGY

## 2024-06-18 NOTE — PROGRESS NOTES
[facility] Clinical Note    Kristina Lundberg is a 73 y.o. female, who is on the specialty pharmacy service of: [facility].  Kristina Lundberg is taking: entecavir.     Kristina was contacted on 6/18/2024.    Refer to the encounter summary report for documentation details about patient counseling and education.      Impression/Plan  Is patient high risk? (potential patients:  pregnancy, geriatric, pediatric)   yes, geriatric  Is laboratory follow-up needed? yes  Is a clinical intervention needed?  no  Next assessment date?  Approximately 12/14/24  Additional comments:  -Patient had a number of questions about the entecavir; discussed the indication and need for prophylaxis and risk vs. Benefit  -She also had questions about whether the med leads to bone loss and alopecia (not known issues). Regarding alopecia, there is a postmarketing report. Patient thinks this may be due to COVID; she was diagnosed twice.  -Reminded patient that she is overdue for follow up with Dr. Kim. Advised that she make an appointment.    Medication Adherence  The importance of adherence was discussed with the patient and they were advised to take the medication as prescribed by their provider. Kristina was encouraged to call her physician's office if they have a question regarding a missed dose.     QOL/Patient Satisfaction  Rate your quality of life on scale of 1-10: 5 (patient with NMO; has many neuro issues)  Rate your satisfaction with  Specialty Pharmacy on scale of 1-10: 10 - Completely satisfied      Patient advised to contact the pharmacy if there are any changes to her medication list, including prescriptions, OTC medications, herbal products, or supplements. Patient was advised of [facility]’s dispensing process, refill timeline, contact information ([phone]), and patient management follow up. Patient confirmed understanding of education conducted during assessment.  All patient questions and concerns were addressed to the best of my ability. Patient was encouraged to contact the specialty pharmacy with any questions or concerns.    Confirmed follow-up outreaches are properly scheduled. Reviewed goals of therapy in the program targets.    Bhavani Pond PharmD    Lab Results   Component Value Date    ALT 9 03/20/2024    AST 11 03/20/2024    BILIDIR 0.1 03/20/2024    HBVDNAPCRIUM NOT DETECTED 09/19/2023    HEPBSAG Nonreactive 03/20/2024    CREATININE 0.64 06/19/2023    GFRF >90 06/19/2023

## 2024-07-12 ENCOUNTER — APPOINTMENT (OUTPATIENT)
Dept: OPHTHALMOLOGY | Facility: CLINIC | Age: 74
End: 2024-07-12
Payer: MEDICARE

## 2024-07-12 DIAGNOSIS — H52.203 ASTIGMATISM OF BOTH EYES WITH PRESBYOPIA: Primary | ICD-10-CM

## 2024-07-12 DIAGNOSIS — H52.4 ASTIGMATISM OF BOTH EYES WITH PRESBYOPIA: Primary | ICD-10-CM

## 2024-07-12 DIAGNOSIS — H04.123 BILATERAL DRY EYES: ICD-10-CM

## 2024-07-12 PROCEDURE — 92012 INTRM OPH EXAM EST PATIENT: CPT | Performed by: OPTOMETRIST

## 2024-07-12 PROCEDURE — 92015 DETERMINE REFRACTIVE STATE: CPT | Performed by: OPTOMETRIST

## 2024-07-12 RX ORDER — LIFITEGRAST 50 MG/ML
1 SOLUTION/ DROPS OPHTHALMIC 2 TIMES DAILY
Qty: 60 EACH | Refills: 11 | Status: SHIPPED | OUTPATIENT
Start: 2024-07-12 | End: 2025-07-12

## 2024-07-12 ASSESSMENT — TONOMETRY
OS_IOP_MMHG: 14
OD_IOP_MMHG: 14
IOP_METHOD: GOLDMANN APPLANATION

## 2024-07-12 ASSESSMENT — REFRACTION_WEARINGRX
OD_SPHERE: +0.25
OD_CYLINDER: -5.25
OS_SPHERE: +0.25
OD_AXIS: 074
OS_ADD: +2.50
SPECS_TYPE: PAL
OS_AXIS: 001
OD_ADD: +2.50
OS_CYLINDER: -5.25

## 2024-07-12 ASSESSMENT — CONF VISUAL FIELD
OD_INFERIOR_TEMPORAL_RESTRICTION: 0
OS_INFERIOR_NASAL_RESTRICTION: 1
OS_SUPERIOR_TEMPORAL_RESTRICTION: 1
METHOD: COUNTING FINGERS
OS_SUPERIOR_NASAL_RESTRICTION: 1
OS_INFERIOR_TEMPORAL_RESTRICTION: 1
OD_NORMAL: 1
OD_SUPERIOR_NASAL_RESTRICTION: 0
OD_INFERIOR_NASAL_RESTRICTION: 0
OD_SUPERIOR_TEMPORAL_RESTRICTION: 0

## 2024-07-12 ASSESSMENT — ENCOUNTER SYMPTOMS
ENDOCRINE NEGATIVE: 0
ALLERGIC/IMMUNOLOGIC NEGATIVE: 0
EYES NEGATIVE: 1
HEMATOLOGIC/LYMPHATIC NEGATIVE: 0
PSYCHIATRIC NEGATIVE: 0
RESPIRATORY NEGATIVE: 0
NEUROLOGICAL NEGATIVE: 0
CONSTITUTIONAL NEGATIVE: 0
CARDIOVASCULAR NEGATIVE: 0
MUSCULOSKELETAL NEGATIVE: 0
GASTROINTESTINAL NEGATIVE: 0

## 2024-07-12 ASSESSMENT — REFRACTION_MANIFEST
OS_ADD: +2.75
OD_ADD: +2.75
OD_CYLINDER: -6.00
OD_AXIS: 092
OD_SPHERE: PLANO
OS_CYLINDER: SPHERE
OS_SPHERE: PLANO

## 2024-07-12 ASSESSMENT — VISUAL ACUITY
CORRECTION_TYPE: GLASSES
METHOD: SNELLEN - LINEAR
OD_CC: 20/25
OS_CC: LP
OD_CC+: -2

## 2024-07-12 ASSESSMENT — SLIT LAMP EXAM - LIDS
COMMENTS: NORMAL
COMMENTS: NORMAL

## 2024-07-12 ASSESSMENT — EXTERNAL EXAM - RIGHT EYE: OD_EXAM: NORMAL

## 2024-07-12 ASSESSMENT — PACHYMETRY
OD_CT(UM): 589
OS_CT(UM): 580

## 2024-07-12 ASSESSMENT — EXTERNAL EXAM - LEFT EYE: OS_EXAM: NORMAL

## 2024-07-12 ASSESSMENT — CUP TO DISC RATIO: OD_RATIO: 0.4

## 2024-07-12 NOTE — PROGRESS NOTES
74 yo F, EP 7 month Dry eye fuv, Using Sooth XP/Refresh qid OU, wasn't able to find iVizia, Tried WCs, but anything tried seems to make eyes more irritated, hasn't tried again in a few weeks. Had swelling after using lid scrubs and was unable to find the recommended brand. Constant pain OS, Overall dry symptoms haven't really changed.     Testing to evaluate the presence of dry eye disease (DED) was performed today and provided the following results:  The ocular surface disease index questionnaire (OSDI) score was  (scale: 0-12 = normal, 13-22 = mild, 23-32 = moderate, >33 = severe): 40  TearLab, a test for tear film osmolarity revealed (normal <300 mOsm/L, mild 300-320, moderate 320-340, severe >340 mOsm/L, inter eye difference of >8 mOsm/L is considered abnormal as well)  OD:  308 mOsm/L  OS:  289 mOsm/L  Inflammadry, a test for the DED specific MMP-9 inhibitor:  OD:  positive  OS:  positive  Schirmer's test with anesthetic, testing basal tear production (0-5 = severe, 6-10 = moderate, 11-15 = mild):  OD:  23 mm between 1-6 minutes  OS:  28 mm between 1-6 minutes  Tear break up time (TBUT), a measure of tear stability (0-5 = severe, 6-10 = moderate, 11-15 = mild)  OD:  4 seconds  OS:  5 seconds  bleb creates a bubble.   Corneal punctate epithelial erosions (PEE) staining with sodium fluorescein score (5 zones, each PEE level 0-3, maximum score = 15)  OD: NIH PEE score:  0 Central PEE absent  OS: NIH PEE score:  0 Central PEE absent     Meibomain gland disease (Effron scale 0-4, 0:no abnormality, 4: thick creamy yellow expression at all gland orifices, expression continuous, conjunctival redness)  Right eye (OD): stage+3 trace collarettes  Left eye (OS): stage +3 trace collarettes    Meibography was completed and degree of meibomian glands dropout was (Pult scale scale: degree 0 to 4 with 0: indicating 0%, 1: <26%, 2: 26-50%, 3: 51-75% and 4: >75% meibomian gland dropout):  Right upper lid: 1  Right lower  lid:1  Left upper lid:1  Left lower lid: 1    a history of neuromyelitis optica spectrum disease OU, congenital cataract OS with blindness OS, with Hx of cataract removal with peaked pupil, strabismus surgery OS after, transverse myelitis 3/2006, RA, hepatitis B & C, HTN, Graves / hypothyroidism, obesity, former smoker presents in follow up for right optic neuritis.     Onset of eye pain uncertain at least since 2014. OS>>OD.     Symmetrical eyelid findings and only finding OS is conjunctival bleb with bubble with each blink. MGD present with minimal gland loss. No asymmetry. Will recommend a course of FML gtt OS 4x/day x 1 week then BID after and RTC 1 month for follow up. No need to do dry eye testing then. Start using Brudermask BID. There is a possibility of neuropathic dry eye left eye (post multiple surgeries in the past). There is a possibility that the bubble formed on the edge of the bleb is the problem. There is no staining of the surface in this area however.      If steroid provides relief then recommend XIIDRA or cyclosporin.     Has used Brudermask BID and Soothe/Refresh at least BID. Patient has not used the steroid (is afraid of steroid). Start XIIDRA sample provided. Rx sent to pharmacy. A spectacle prescription was dispensed to be used as needed. Trivex or polycarbonate. Computer Rx as well. Had glasses that had the balance lens placed with the porinetation of the astigmatism 90 degrees off and this doid not feel right to the patient. I wrote a new Rx with the astigmatism in the left eye in the correct orientation.     RTC 2 months for all dry eye tests. Consider adding punctal plugs.

## 2024-07-15 DIAGNOSIS — R76.8 HEPATITIS B CORE ANTIBODY POSITIVE: Primary | ICD-10-CM

## 2024-07-23 RX ORDER — ENTECAVIR 0.5 MG/1
TABLET, FILM COATED ORAL
Qty: 90 TABLET | Refills: 3 | Status: SHIPPED | OUTPATIENT
Start: 2024-07-23 | End: 2025-07-23

## 2024-07-24 ENCOUNTER — SPECIALTY PHARMACY (OUTPATIENT)
Dept: PHARMACY | Facility: CLINIC | Age: 74
End: 2024-07-24

## 2024-07-24 PROCEDURE — RXMED WILLOW AMBULATORY MEDICATION CHARGE

## 2024-07-26 ENCOUNTER — PHARMACY VISIT (OUTPATIENT)
Dept: PHARMACY | Facility: CLINIC | Age: 74
End: 2024-07-26
Payer: COMMERCIAL

## 2024-09-13 ENCOUNTER — APPOINTMENT (OUTPATIENT)
Dept: PRIMARY CARE | Facility: CLINIC | Age: 74
End: 2024-09-13
Payer: MEDICARE

## 2024-09-17 ENCOUNTER — APPOINTMENT (OUTPATIENT)
Dept: OPHTHALMOLOGY | Facility: CLINIC | Age: 74
End: 2024-09-17
Payer: MEDICARE

## 2024-09-17 DIAGNOSIS — Z79.899 IMMUNODEFICIENCY DUE TO DRUG THERAPY (MULTI): Primary | ICD-10-CM

## 2024-09-17 DIAGNOSIS — D84.821 IMMUNODEFICIENCY DUE TO DRUG THERAPY (MULTI): Primary | ICD-10-CM

## 2024-09-17 DIAGNOSIS — G36.0 NEUROMYELITIS OPTICA (MULTI): ICD-10-CM

## 2024-09-18 ENCOUNTER — TELEPHONE (OUTPATIENT)
Dept: GASTROENTEROLOGY | Facility: HOSPITAL | Age: 74
End: 2024-09-18
Payer: MEDICARE

## 2024-09-18 DIAGNOSIS — R76.8 HEPATITIS B CORE ANTIBODY POSITIVE: Primary | ICD-10-CM

## 2024-09-19 ENCOUNTER — LAB (OUTPATIENT)
Dept: LAB | Facility: LAB | Age: 74
End: 2024-09-19
Payer: MEDICARE

## 2024-09-19 ENCOUNTER — APPOINTMENT (OUTPATIENT)
Dept: PRIMARY CARE | Facility: CLINIC | Age: 74
End: 2024-09-19
Payer: MEDICARE

## 2024-09-19 VITALS
HEART RATE: 91 BPM | BODY MASS INDEX: 32.18 KG/M2 | OXYGEN SATURATION: 95 % | DIASTOLIC BLOOD PRESSURE: 82 MMHG | HEIGHT: 63 IN | WEIGHT: 181.6 LBS | SYSTOLIC BLOOD PRESSURE: 130 MMHG

## 2024-09-19 DIAGNOSIS — G36.0 NEUROMYELITIS OPTICA (MULTI): ICD-10-CM

## 2024-09-19 DIAGNOSIS — E55.9 VITAMIN D DEFICIENCY: ICD-10-CM

## 2024-09-19 DIAGNOSIS — R73.9 HYPERGLYCEMIA: ICD-10-CM

## 2024-09-19 DIAGNOSIS — E53.8 VITAMIN B12 DEFICIENCY: ICD-10-CM

## 2024-09-19 DIAGNOSIS — R76.8 HEPATITIS B CORE ANTIBODY POSITIVE: ICD-10-CM

## 2024-09-19 DIAGNOSIS — D84.821 IMMUNODEFICIENCY DUE TO DRUG THERAPY (MULTI): ICD-10-CM

## 2024-09-19 DIAGNOSIS — E04.1 THYROID NODULE: ICD-10-CM

## 2024-09-19 DIAGNOSIS — I10 BENIGN ESSENTIAL HYPERTENSION: ICD-10-CM

## 2024-09-19 DIAGNOSIS — J30.9 ALLERGIC RHINITIS, UNSPECIFIED SEASONALITY, UNSPECIFIED TRIGGER: ICD-10-CM

## 2024-09-19 DIAGNOSIS — E03.9 HYPOTHYROIDISM, UNSPECIFIED TYPE: Primary | ICD-10-CM

## 2024-09-19 DIAGNOSIS — E03.9 HYPOTHYROIDISM, UNSPECIFIED TYPE: ICD-10-CM

## 2024-09-19 DIAGNOSIS — Z79.899 IMMUNODEFICIENCY DUE TO DRUG THERAPY (MULTI): ICD-10-CM

## 2024-09-19 PROBLEM — U07.1 DISEASE DUE TO SEVERE ACUTE RESPIRATORY SYNDROME CORONAVIRUS 2 (SARS-COV-2): Status: ACTIVE | Noted: 2024-09-19

## 2024-09-19 PROBLEM — J98.01 BRONCHOSPASM: Status: ACTIVE | Noted: 2024-09-19

## 2024-09-19 PROBLEM — R31.29 MICROSCOPIC HEMATURIA: Status: ACTIVE | Noted: 2024-09-19

## 2024-09-19 PROBLEM — H57.09 RELATIVE AFFERENT PUPILLARY DEFECT OF RIGHT EYE: Status: ACTIVE | Noted: 2019-06-21

## 2024-09-19 PROBLEM — R11.0 NAUSEA: Status: ACTIVE | Noted: 2024-09-19

## 2024-09-19 PROBLEM — N13.30 BILATERAL HYDRONEPHROSIS: Status: ACTIVE | Noted: 2024-09-19

## 2024-09-19 PROBLEM — N32.0 BLADDER OUTLET OBSTRUCTION: Status: ACTIVE | Noted: 2024-09-19

## 2024-09-19 PROBLEM — R07.89 CHEST DISCOMFORT: Status: ACTIVE | Noted: 2024-09-19

## 2024-09-19 PROBLEM — N81.9 FEMALE GENITAL PROLAPSE: Status: ACTIVE | Noted: 2024-09-19

## 2024-09-19 PROBLEM — M06.9 RHEUMATOID ARTHRITIS: Status: ACTIVE | Noted: 2023-01-21

## 2024-09-19 PROBLEM — L60.9 DISEASE OF NAIL: Status: ACTIVE | Noted: 2024-09-19

## 2024-09-19 PROBLEM — R51.9 HEADACHE: Status: ACTIVE | Noted: 2024-09-19

## 2024-09-19 PROBLEM — R53.1 WEAKNESS: Status: ACTIVE | Noted: 2024-09-19

## 2024-09-19 PROBLEM — J18.9 PNEUMONIA: Status: ACTIVE | Noted: 2024-09-19

## 2024-09-19 PROBLEM — Z86.39 HISTORY OF GRAVES' DISEASE: Status: ACTIVE | Noted: 2024-09-19

## 2024-09-19 PROBLEM — C50.119 MALIGNANT NEOPLASM OF CENTRAL PORTION OF FEMALE BREAST: Status: ACTIVE | Noted: 2022-10-20

## 2024-09-19 PROBLEM — R06.02 SHORTNESS OF BREATH AT REST: Status: ACTIVE | Noted: 2024-09-19

## 2024-09-19 PROBLEM — Q12.0 CONGENITAL CATARACT: Status: ACTIVE | Noted: 2023-01-21

## 2024-09-19 PROBLEM — H66.90 OTITIS MEDIA: Status: ACTIVE | Noted: 2024-09-19

## 2024-09-19 PROBLEM — H10.30 ACUTE CONJUNCTIVITIS: Status: ACTIVE | Noted: 2018-05-09

## 2024-09-19 PROBLEM — Z86.79 HISTORY OF HYPERTENSION: Status: ACTIVE | Noted: 2024-09-19

## 2024-09-19 LAB
25(OH)D3 SERPL-MCNC: 98 NG/ML (ref 30–100)
ALBUMIN SERPL BCP-MCNC: 4.3 G/DL (ref 3.4–5)
ALP SERPL-CCNC: 64 U/L (ref 33–136)
ALT SERPL W P-5'-P-CCNC: 9 U/L (ref 7–45)
ANION GAP SERPL CALC-SCNC: 15 MMOL/L (ref 10–20)
AST SERPL W P-5'-P-CCNC: 13 U/L (ref 9–39)
BASOPHILS # BLD AUTO: 0.04 X10*3/UL (ref 0–0.1)
BASOPHILS NFR BLD AUTO: 0.9 %
BILIRUB DIRECT SERPL-MCNC: 0.1 MG/DL (ref 0–0.3)
BILIRUB SERPL-MCNC: 0.5 MG/DL (ref 0–1.2)
BUN SERPL-MCNC: 14 MG/DL (ref 6–23)
CALCIUM SERPL-MCNC: 9.3 MG/DL (ref 8.6–10.6)
CHLORIDE SERPL-SCNC: 105 MMOL/L (ref 98–107)
CO2 SERPL-SCNC: 25 MMOL/L (ref 21–32)
CREAT SERPL-MCNC: 0.57 MG/DL (ref 0.5–1.05)
EGFRCR SERPLBLD CKD-EPI 2021: >90 ML/MIN/1.73M*2
EOSINOPHIL # BLD AUTO: 0.16 X10*3/UL (ref 0–0.4)
EOSINOPHIL NFR BLD AUTO: 3.5 %
ERYTHROCYTE [DISTWIDTH] IN BLOOD BY AUTOMATED COUNT: 13.5 % (ref 11.5–14.5)
EST. AVERAGE GLUCOSE BLD GHB EST-MCNC: 120 MG/DL
GLUCOSE SERPL-MCNC: 106 MG/DL (ref 74–99)
HBA1C MFR BLD: 5.8 %
HBV SURFACE AG SERPL QL IA: NONREACTIVE
HCT VFR BLD AUTO: 44.7 % (ref 36–46)
HGB BLD-MCNC: 14.4 G/DL (ref 12–16)
IGA SERPL-MCNC: 66 MG/DL (ref 70–400)
IGG SERPL-MCNC: 642 MG/DL (ref 700–1600)
IGM SERPL-MCNC: 7 MG/DL (ref 40–230)
IMM GRANULOCYTES # BLD AUTO: 0.02 X10*3/UL (ref 0–0.5)
IMM GRANULOCYTES NFR BLD AUTO: 0.4 % (ref 0–0.9)
LYMPHOCYTES # BLD AUTO: 0.93 X10*3/UL (ref 0.8–3)
LYMPHOCYTES NFR BLD AUTO: 20.5 %
MCH RBC QN AUTO: 29.8 PG (ref 26–34)
MCHC RBC AUTO-ENTMCNC: 32.2 G/DL (ref 32–36)
MCV RBC AUTO: 92 FL (ref 80–100)
MONOCYTES # BLD AUTO: 0.49 X10*3/UL (ref 0.05–0.8)
MONOCYTES NFR BLD AUTO: 10.8 %
NEUTROPHILS # BLD AUTO: 2.9 X10*3/UL (ref 1.6–5.5)
NEUTROPHILS NFR BLD AUTO: 63.9 %
NRBC BLD-RTO: 0 /100 WBCS (ref 0–0)
PLATELET # BLD AUTO: 245 X10*3/UL (ref 150–450)
POTASSIUM SERPL-SCNC: 4.3 MMOL/L (ref 3.5–5.3)
PROT SERPL-MCNC: 6.3 G/DL (ref 6.4–8.2)
RBC # BLD AUTO: 4.84 X10*6/UL (ref 4–5.2)
SODIUM SERPL-SCNC: 141 MMOL/L (ref 136–145)
T3FREE SERPL-MCNC: 2.6 PG/ML (ref 2.3–4.2)
T4 FREE SERPL-MCNC: 1.26 NG/DL (ref 0.78–1.48)
TSH SERPL-ACNC: 1.7 MIU/L (ref 0.44–3.98)
VIT B12 SERPL-MCNC: 281 PG/ML (ref 211–911)
WBC # BLD AUTO: 4.5 X10*3/UL (ref 4.4–11.3)

## 2024-09-19 PROCEDURE — 1159F MED LIST DOCD IN RCRD: CPT | Performed by: FAMILY MEDICINE

## 2024-09-19 PROCEDURE — 84439 ASSAY OF FREE THYROXINE: CPT

## 2024-09-19 PROCEDURE — 3008F BODY MASS INDEX DOCD: CPT | Performed by: FAMILY MEDICINE

## 2024-09-19 PROCEDURE — 83036 HEMOGLOBIN GLYCOSYLATED A1C: CPT

## 2024-09-19 PROCEDURE — 82306 VITAMIN D 25 HYDROXY: CPT

## 2024-09-19 PROCEDURE — 36415 COLL VENOUS BLD VENIPUNCTURE: CPT

## 2024-09-19 PROCEDURE — 85025 COMPLETE CBC W/AUTO DIFF WBC: CPT

## 2024-09-19 PROCEDURE — 99214 OFFICE O/P EST MOD 30 MIN: CPT | Performed by: FAMILY MEDICINE

## 2024-09-19 PROCEDURE — 82607 VITAMIN B-12: CPT

## 2024-09-19 PROCEDURE — 82248 BILIRUBIN DIRECT: CPT

## 2024-09-19 PROCEDURE — 3079F DIAST BP 80-89 MM HG: CPT | Performed by: FAMILY MEDICINE

## 2024-09-19 PROCEDURE — 87517 HEPATITIS B DNA QUANT: CPT

## 2024-09-19 PROCEDURE — 1157F ADVNC CARE PLAN IN RCRD: CPT | Performed by: FAMILY MEDICINE

## 2024-09-19 PROCEDURE — 82784 ASSAY IGA/IGD/IGG/IGM EACH: CPT

## 2024-09-19 PROCEDURE — 1160F RVW MEDS BY RX/DR IN RCRD: CPT | Performed by: FAMILY MEDICINE

## 2024-09-19 PROCEDURE — 87340 HEPATITIS B SURFACE AG IA: CPT

## 2024-09-19 PROCEDURE — 3075F SYST BP GE 130 - 139MM HG: CPT | Performed by: FAMILY MEDICINE

## 2024-09-19 PROCEDURE — 84481 FREE ASSAY (FT-3): CPT

## 2024-09-19 PROCEDURE — 80053 COMPREHEN METABOLIC PANEL: CPT

## 2024-09-19 PROCEDURE — 1036F TOBACCO NON-USER: CPT | Performed by: FAMILY MEDICINE

## 2024-09-19 PROCEDURE — 84443 ASSAY THYROID STIM HORMONE: CPT

## 2024-09-19 RX ORDER — ALBUTEROL SULFATE 90 UG/1
2 INHALANT RESPIRATORY (INHALATION) EVERY 6 HOURS PRN
Qty: 18 G | Refills: 3 | Status: SHIPPED | OUTPATIENT
Start: 2024-09-19

## 2024-09-19 ASSESSMENT — PATIENT HEALTH QUESTIONNAIRE - PHQ9
2. FEELING DOWN, DEPRESSED OR HOPELESS: NOT AT ALL
1. LITTLE INTEREST OR PLEASURE IN DOING THINGS: NOT AT ALL
SUM OF ALL RESPONSES TO PHQ9 QUESTIONS 1 & 2: 0

## 2024-09-19 ASSESSMENT — ENCOUNTER SYMPTOMS
DEPRESSION: 0
OCCASIONAL FEELINGS OF UNSTEADINESS: 0
LOSS OF SENSATION IN FEET: 0

## 2024-09-19 NOTE — PROGRESS NOTES
"    /82 (BP Location: Left arm)   Pulse 91   Ht 1.6 m (5' 3\")   Wt 82.4 kg (181 lb 9.6 oz)   SpO2 95%   BMI 32.17 kg/m²     Past Medical History:   Diagnosis Date    Acute atopic conjunctivitis, bilateral 05/09/2018    Allergic conjunctivitis of both eyes    Acute transverse myelitis in demyelinating disease of central nervous system (Multi) 11/20/2017    Transverse myelitis    Age-related nuclear cataract, right eye 12/20/2022    Age-related nuclear cataract of right eye    Breast cancer (Multi) 2021    Left    Chronic viral hepatitis C (Multi) 02/23/2018    Chronic hepatitis C without hepatic coma    Chronic viral hepatitis C (Multi) 04/20/2018    Chronic hepatitis C without hepatic coma    Chronic viral hepatitis C (Multi) 05/25/2018    Chronic hepatitis C without hepatic coma    Chronic viral hepatitis C (Multi) 07/17/2018    Chronic hepatitis C without hepatic coma    Congenital cataract     Congenital cataract    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    Neuromyelitis optica    Ocular pain, unspecified eye 03/10/2014    Eye pain    Other abnormal and inconclusive findings on diagnostic imaging of breast 11/03/2020    Abnormal finding on breast imaging    Other anomalies of pupillary function 06/21/2019    Relative afferent pupillary defect of right eye    Other microscopic hematuria     Microscopic hematuria    Other specified abnormal findings of blood chemistry     Abnormal liver function test    Other specified abnormal findings of blood chemistry 07/17/2018    Abnormal liver function test    Other specified abnormal immunological findings in serum 02/23/2018    Hepatitis B core antibody positive    Other specified abnormal immunological findings in serum 04/20/2018    Hepatitis B core antibody positive    Other specified abnormal immunological findings in serum 05/25/2018    Hepatitis B core antibody positive    Other specified abnormal immunological findings in serum 07/17/2018    " Hepatitis B core antibody positive    Other subjective visual disturbances 12/20/2022    Photopsia of right eye    Personal history of irradiation 2021    Left breast    Personal history of other diseases of the circulatory system     History of hypertension    Personal history of other diseases of the nervous system and sense organs 05/09/2018    History of acute conjunctivitis    Personal history of other diseases of the nervous system and sense organs 12/20/2022    History of acute conjunctivitis    Personal history of other diseases of the nervous system and sense organs 02/22/2013    History of cataract    Personal history of other endocrine, nutritional and metabolic disease     History of Graves' disease    Personal history of other infectious and parasitic diseases 11/13/2018    History of hepatitis C virus infection    Personal history of other specified conditions     History of abdominal pain    Personal history of other specified conditions 12/04/2019    History of headache    Pupillary abnormality, left eye 10/03/2017    Pupillary abnormality of left eye    Rheumatoid arthritis, unspecified (Multi)     Rheumatoid arthritis    Spondylosis without myelopathy or radiculopathy, cervical region     Cervical spondylosis    Spondylosis without myelopathy or radiculopathy, cervical region 07/03/2014    Cervical spondylosis    Unspecified abdominal pain 04/20/2018    Abdominal pain    Unspecified abdominal pain 05/25/2018    Abdominal pain    Unspecified abdominal pain 07/17/2018    Abdominal pain    Unspecified optic neuritis 12/20/2022    Optic neuritis, right    Vitamin D deficiency, unspecified 07/17/2018    Vitamin D insufficiency       Patient Active Problem List   Diagnosis    Age-related nuclear cataract of right eye    Benign essential hypertension    Bilateral dry eyes    Bilateral keratitis    Blepharitis of both upper and lower eyelid of left eye    Central scotoma, right eye    Cervical spondylosis     Chronic hepatitis C without hepatic coma (Multi)    Colon polyp    Conjunctival concretions of right eye    Dermatitis of face    Elevated IOP    Exposure to medical therapeutic radiation    Graves disease    Hepatitis B core antibody positive    Hepatitis C virus infection cured after antiviral drug therapy    Hip pain, right    Neuromyelitis optica (Multi)    On rituximab therapy    Pain, upper back    Photopsia of right eye    PVD (posterior vitreous detachment), right eye    Vitamin D deficiency    Hypothyroidism, unspecified    Steroid responder, bilateral    Status post bilateral breast reduction    History of adenomatous polyp of colon    Drug-induced immunodeficiency (Multi)    Acute disseminated demyelination, unspecified (Multi)    Telogen effluvium    Transverse myelitis (Multi)    Discomfort of right ear    Class 1 obesity due to excess calories with serious comorbidity and body mass index (BMI) of 32.0 to 32.9 in adult    Incomplete uterovaginal prolapse    Bowel dysfunction    Cystocele, midline    Muscle weakness    OAB (overactive bladder)    Rectocele    Urge urinary incontinence    Urinary frequency    Urinary urgency    Thyroid nodule    Astigmatism of both eyes with presbyopia    Weakness    Shortness of breath at rest    Rheumatoid arthritis (Multi)    Relative afferent pupillary defect of right eye    Pneumonia    Otitis media    Nausea    Microscopic hematuria    Malignant neoplasm of central portion of female breast (Multi)    History of hypertension    History of Graves' disease    Headache    Female genital prolapse    Disease of nail    Disease due to severe acute respiratory syndrome coronavirus 2 (SARS-CoV-2)    Congenital cataract    Chest discomfort    Bronchospasm    Bladder outlet obstruction    Bilateral hydronephrosis    Acute conjunctivitis       Current Outpatient Medications   Medication Sig Dispense Refill    albuterol 2.5 mg /3 mL (0.083 %) nebulizer solution Take 3 mL (2.5  mg) by nebulization 4 times a day as needed for wheezing or shortness of breath.      albuterol 90 mcg/actuation inhaler Inhale 2 puffs every 6 hours if needed.      artificial tears, dextran-hypomel-glycerin, 0.1-0.3-0.2 % ophthalmic solution Administer 2 drops into the left eye every 4 hours.      aspirin 325 mg tablet Take 1 tablet (325 mg) by mouth once daily as needed for headaches or moderate pain (4 - 6).      calcium carb/vitamin D3/vit K1 (VIACTIV ORAL) Take by mouth once daily.      cholecalciferol (Vitamin D-3) 50,000 unit capsule TAKE 1 CAPSULE BY MOUTH once a week 12 capsule 3    entecavir (Baraclude) 0.5 mg tablet TAKE ONE (1) TABLET BY MOUTH DAILY 1 OR 2 HOURS BEFORE A MEAL. 90 tablet 3    ketoconazole (NIZOral) 2 % shampoo Apply topically. ply 1 Application daily topically. Lather on to scalp, let sit for 2-5 minutes. Rinse thoroughly. Use 2-3 times per week.      riTUXimab (Rituxan) 10 mg/mL injection Infuse 100 mL (1,000 mg total) into a venous catheter every 6 months.      Synthroid 100 mcg tablet TAKE 1 TABLET FOR 6 DAYS a week and 1 day off 90 tablet 3    valsartan (Diovan) 40 mg tablet Take 0.5 tablets (20 mg) by mouth once daily. 45 tablet 3    buPROPion (Wellbutrin) 75 mg tablet Take 1 tablet (75 mg) by mouth early in the morning.. 30 tablet 11    triamcinolone (Kenalog) 0.1 % cream Apply to itchy areas (avoid face, skin folds) 2x daily for up to 2 weeks (Patient not taking: Reported on 9/19/2024) 454 g 1    Xiidra 5 % dropperette Administer 1 drop into affected eye(s) 2 times a day. (Patient not taking: Reported on 9/19/2024) 60 each 11     No current facility-administered medications for this visit.       CC/HPI/ASSESSMENT/PLAN    CC follow-up medication    HPI patient 73-year-old female history of hypothyroidism hypertension allergic rhinitis neuromyelitis optica transverse myelitis, medications are reviewed.  Patient notes overall doing well.  She is experiencing ocular migraines  recently.  Blood pressures been under good control.  She remains on Synthroid 6 days weekly.  We discussed rechecking blood work.  She is due for Rituxan therapy soon.  Denies fever chills chest pain blood in urine or stool.  Request refills for medication.    Exam: Eyes no jaundice ears clear bilaterally neck supple lungs CTA CV RRR skin no rash    A/P 1 hypothyroidism chronic stable 2 hypertension chronic stable 3 allergic rhinitis #4 neuromyelitis optica.  Extensive blood work is ordered.  Medications are refilled.  Follow-up 4 months.  She will get her Rituxan treatment as scheduled.  She will follow-up with her specialist as previously arranged.  #5 thyroid nodule.  Ultrasound thyroid done in June reviewed, nodule stable.  Recheck thyroid ultrasound in 6 months    There are no diagnoses linked to this encounter.

## 2024-09-23 ENCOUNTER — OFFICE VISIT (OUTPATIENT)
Dept: NEUROLOGY | Facility: HOSPITAL | Age: 74
End: 2024-09-23
Payer: MEDICARE

## 2024-09-23 VITALS
TEMPERATURE: 97.7 F | BODY MASS INDEX: 32.07 KG/M2 | SYSTOLIC BLOOD PRESSURE: 125 MMHG | HEART RATE: 84 BPM | HEIGHT: 63 IN | WEIGHT: 181 LBS | RESPIRATION RATE: 18 BRPM | DIASTOLIC BLOOD PRESSURE: 72 MMHG

## 2024-09-23 DIAGNOSIS — G36.0 NEUROMYELITIS OPTICA (MULTI): Primary | ICD-10-CM

## 2024-09-23 PROCEDURE — 3078F DIAST BP <80 MM HG: CPT | Performed by: PSYCHIATRY & NEUROLOGY

## 2024-09-23 PROCEDURE — 99214 OFFICE O/P EST MOD 30 MIN: CPT | Performed by: PSYCHIATRY & NEUROLOGY

## 2024-09-23 PROCEDURE — 3074F SYST BP LT 130 MM HG: CPT | Performed by: PSYCHIATRY & NEUROLOGY

## 2024-09-23 PROCEDURE — 1159F MED LIST DOCD IN RCRD: CPT | Performed by: PSYCHIATRY & NEUROLOGY

## 2024-09-23 PROCEDURE — 3008F BODY MASS INDEX DOCD: CPT | Performed by: PSYCHIATRY & NEUROLOGY

## 2024-09-23 PROCEDURE — 1036F TOBACCO NON-USER: CPT | Performed by: PSYCHIATRY & NEUROLOGY

## 2024-09-23 PROCEDURE — 1157F ADVNC CARE PLAN IN RCRD: CPT | Performed by: PSYCHIATRY & NEUROLOGY

## 2024-09-23 PROCEDURE — 1126F AMNT PAIN NOTED NONE PRSNT: CPT | Performed by: PSYCHIATRY & NEUROLOGY

## 2024-09-23 ASSESSMENT — PAIN SCALES - GENERAL: PAINLEVEL: 0-NO PAIN

## 2024-09-23 NOTE — PATIENT INSTRUCTIONS
Your last blood work was good. Your IGG level declined only minimally.     I'm glad you are doing relatively well on the half dose of rituximab.     Please continue the same of vitamin D.     Please start wellbutrin for fatigue and low mood    Follow up after six months.     Thank you for visiting the clinic today    Reji Ryan MD

## 2024-09-23 NOTE — PROGRESS NOTES
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 Jaime right RNFL thinning   CSF: done in 10/2017: W 14, P 162, IGG index high, OCBs negative.   JCV: positive in 11/2017, index 3.35  VZV: positive in 11/2017  HEP panel: Hep C PCR positive, Hep B core antibody positive , Hep B S antibody positive, Hep B surface antigen negative (concurrent hep C and B treatment with rituximab).   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      Interval hx:  IgG level decreased slightly. ALC remain normal on half dose rituximab. No serious infections. Never started wellbutrin and continues with fatigue and low mood. Has itching on the neck and continued left eye pain, headaches, and occasional wavy vision and glare.      NMO symptom review:     weakness: yes in 2006, resolved   sensory changes: yes in 2006, persistent in the arms  incoordination: no  falling: no  gait change: yes in 2006, resolved   painful vision loss: yes right posterior optic neuritis in 10/2017.  Also reports several episodes of pain and changed light perception in her congenitally blind left eye since 2006.   double vision: no  vertigo: not currently   facial/bulbar weakness: never   Lhermitte's: yes since 2006  bladder/bowel dysfunction: mild urinary urgency since 2006     spasticity: yes in the legs   tonic spasms: yes rare extensor spasms of the RLE  tremors: no  RLS: not asked   dystonia: yes paroxysmal in the right hand.   other movement disorders: no     heat sensitivity: yes severe  fatigue: yes and excessive daytime sleepiness.   depression: no  anxiety: yes  cognitive changes: no     DMT: rituximab since 1/2018  medication side effects: fatigue  last blood work: 4/30/2018     Vitamin D: taking 27105 units weekly   symptomatic meds: has baclofen for spasms but she rarely takes it.                  Review of Systems  All systems reviewed and are negative except as mentioned in the HPI.        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) (H53.411)   · Cervical spondylosis (721.0) (M47.812)   · Chronic hepatitis C without hepatic coma (070.54) (B18.2)   · Class 1 obesity with body mass index (BMI) of 31.0 to 31.9 in adult (278.00,V85.31)  (E66.9,Z68.31)   · Colon polyp (211.3) (K63.5)   · COVID-19 (079.89) (U07.1)   · Elevated IOP (365.00) (H40.059)   · Exposure to medical therapeutic radiation (E873.3) (Y63.3)   · Former smoker (V15.82) (Z87.891)   · Graves disease (242.00) (E05.00)   · Hepatitis B core antibody positive (795.79) (R76.8)   · Hepatitis C virus infection cured after antiviral drug  therapy (V12.09) (Z86.19)   · Hip pain, right (719.45) (M25.551)   · History of adenomatous polyp of colon (V12.72) (Z86.010)   · Hypothyroidism (244.9) (E03.9)   · Low back pain (724.2) (M54.50)   · Malignant neoplasm of central portion of left breast in female, estrogen receptor positive  (174.1,V86.0) (C50.112,Z17.0)   · Nausea in adult (787.02) (R11.0)   · 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)   · On rituximab therapy (V58.69) (Z79.620)   · Optic neuritis, right (377.30) (H46.9)   · Other visual distortions and entoptic phenomena (368.15) (H53.19)   · Pain of left lower extremity (729.5) (M79.605)   · Pain, upper back (724.5) (M54.9)   · Photopsia of right eye (368.15) (H53.19)   · PVD (posterior vitreous detachment), right eye (379.21) (H43.811)   · Shingles (053.9) (B02.9)   · Status post bilateral breast reduction (V45.89) (Z98.890)   · Steroid responder, bilateral (365.03) (H40.043)   · Thyroid nodule (241.0) (E04.1)   · Transverse myelitis (323.82) (G37.3)   · Vitamin D deficiency (268.9) (E55.9)   · Vitreous degeneration of right eye (379.21) (H43.811)     Past Medical History  Problems    · History of Abdominal pain (789.00) (R10.9)   · History of Abdominal pain (789.00) (R10.9)   · History of Abdominal pain (789.00) (R10.9)   · History of Abnormal finding on breast imaging (793.89) (R92.8)   · Resolved Date: 21 Mar 2022   · History of Abnormal liver function test (790.6) (R79.89)   · History of Abnormal liver function test (790.6) (R79.89)   · Resolved Date: 21 Mar 2022   · Age-related nuclear cataract of right eye (366.16) (H25.11)   · History of Allergic conjunctivitis of both eyes (372.14) (H10.13)   · History of Cervical spondylosis (721.0) (M47.812)   · History of Cervical spondylosis (721.0) (M47.812)   · Resolved Date: 11 Nov 2021   · History of Chronic hepatitis C without hepatic coma (070.54) (B18.2)   · History of Chronic hepatitis C without hepatic coma (070.54) (B18.2)   · History of Chronic  hepatitis C without hepatic coma (070.54) (B18.2)   · History of Chronic hepatitis C without hepatic coma (070.54) (B18.2)   · History of Congenital cataract (743.30) (Q12.0)   · Removed age 5 - Legally blind left eye - eye exam scheduled   · History of Eye pain (379.91) (H57.10)   · Resolved Date: 03 Jul 2014   · History of Hepatitis B core antibody positive (795.79) (R76.8)   · History of Hepatitis B core antibody positive (795.79) (R76.8)   · History of Hepatitis B core antibody positive (795.79) (R76.8)   · History of Hepatitis B core antibody positive (795.79) (R76.8)   · History of abdominal pain (V13.89) (Z87.898)   · Resolved Date: 11 Nov 2021   · History of acute conjunctivitis (V12.49) (Z86.69)   · History of acute conjunctivitis (V12.49) (Z86.69)   · Resolved Date: 11 Nov 2021   · History of cataract (V12.49) (Z86.69)   · Resolved Date: 10 Mar 2014   · h/o congenital cataract left eye, removed age 5. 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)   · Optic neuritis, right (377.30) (H46.9)   · Photopsia of right eye (368.15) (H53.19)   · History of Pupillary abnormality of left eye (364.75) (H21.562)   · History of Relative afferent pupillary defect of right eye (379.49) (H57.09)   · History of Rheumatoid arthritis (714.0) (M06.9)   · History of Transverse myelitis (323.82) (G37.3)   · Resolved Date: 20 Nov 2017   · 4/06 - (C3-4, C4-5)   · History of Vitamin D insufficiency (268.9) (E55.9)   · Resolved Date: 11 Nov 2021     Surgical History  Problems    · History of Ankle Arthrodesis Left   · Triple  arthrodesis of the left ankle   · History of Bunion Correction By Garcia Procedure   · Bunionectomy of the left foot   · History of Cataract Surgery   · History of Complete Colonoscopy   · 2/07: 5yr f/u - Dr. Cody   · History of Dilation And Curettage   · History of Endometrial Biopsy By Suction   · 1/07 Dr. Haq   · History of Exploratory Laparoscopy   · History of Knee Replacement   · Left Knee replaced      Right knee replaced 2000   · History of Mastectomy partial   · History of Rotator Cuff Repair   · Right rotator cuff repair 6/11   · History of Total Knee Arthroplasty     Family History  Mother    · Family history of Breast Cancer (V16.3)  Father    · Family history of Acute Myocardial Infarction (V17.3)   · Family history of cardiac disorder (V17.49) (Z82.49)  Maternal Grandmother    · Family history of diabetes mellitus (V18.0) (Z83.3)   · Family history of malignant neoplasm (V16.9) (Z80.9)   · Family history of Type 2 diabetes mellitus with other circulatory complication     Social History  Problems    · Caffeine use (V49.89) (Z78.9)   · 2 cups daily   · Former smoker (V15.82) (Z87.891)   · No illicit drug use   · Sabianism Affiliation Alevism   · Social alcohol use (V49.89) (Z78.9)   ·  (V61.07) (Z63.4)     Allergies  Medication    · Motrin SUSP   Adverse Reaction; Lips and Face swelling; Swelling; Recorded By: Manny Russell; 12/7/2012 8:11:46 AM   · ibuprofen   Recorded By: Avani Alaniz; 3/26/2021 2:16:50 PM   · Lisinopril TABS   Recorded By: Cate Fuller; 7/3/2014 2:54:07 PM   · Lyrica CAPS   Recorded By: Cate Fuller; 7/3/2014 2:54:07 PM  Denied    · Losartan Potassium TABS   Updated By: Cate Fuller; 8/28/2014 12:40:37 PM      Provider Impressions  Assessment:  This is a 67 year old right handed White female patient, Jahova's witness, with PMH significant for left eye blindness due to congenital cataract, transverse myelitis (3/2006), untreated Hep C, HTN, Grave's  disease, and obesity who presented originally in 10/2017 with acute right optic neuritis resistant to steroids. improved partially with PLEX. The initial neurological exam was positive for central scotoma and red desaturation in the right eye. I have personally reviewed the MRIs which showed a longitudinally-extensive enhancing lesion in the posterior right optic nerve with chiasmal involvement without demyelinating lesions in the brain. cervical MRI showed a non-enhancing short segment demyelinating plaque at C5 unchanged from the initial spine scan from 2006. CSF showed increased lymphocytes, protein, and IGG index but negative OCBs. OCT/VEP consistent with right optic neuritis. She tested negative for Aqp4 (serum and CSF) and anti MOG antibodies. 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 After consulting with hepatology, she was started on rituximab in January 2018 along with treatment for Hep B and Hep C. She's doing well apart from some fluctuation of residual neurological symptoms.      11/14/2018: stable. some mild spasticity in the legs and new headaches. No relapses. tolerating rituximab well.  06/05/2019: stable. mild fluctuation of residual symptoms. Right hip pain so we need to r/o avascular necrosis of the right hip.   12/04/2019: some nighttime headache and diffuse body pains. Will check ESR and CRP. no new relapses.   06/03/2020: Worsening fatigue and mood but declined wellbutrin, amantadine, and sleep study. Says she won't want to take the SARS-COV-2 vaccine when it becomes available. Will check Ig, ALC, and B cell phenotypes today.     11/19/2020: No new relapses. she was recently diagnosed with early breast cancer based on biopsy and is planed for lumpectomy with sentinel LN dissection on 12/2 followed by hormonal and radiotherapy. Her labs from June showed normal IGG level and ALC, and zero B cells. She continues to have morning occipital headaches that improve when she  stands up and walks but not worsening than before and she has had those for years. I offered her a brain MRI to r/o brain or meningeal mets but she declined given stability and chronicity of her headache which is reasonable. The new diagnosis of breast cancer raises a question about the possibility of paraneoplastic NMOSD so I will send the serum autoimmune encephalopathy panel on her especially to look for anti-CV2. This also raises a question about the relation to (and safety of) rituximab. Studies of rituximab therapy in RA and MS showed no increased risk of breast or other cancers but MS studies with the related ocrelizumab (humanized rituximab) showed some increase in breast cancer cases in patients with PPMS. I will check with her breast oncologist regarding their view of rituximab. The patient feels strongly towards staying on rituximab and is more worried about getting an NMOSD attack if she is to come off of it. Of course, if her CV2 antibody comes back positive then we are clearly dealing with a paraneoplastic NMOSD picture and in that case cancer treatment may lead to remission and there will be no need for long-term therapy with rituximab any more.      02/04/2021: No new relapses. she underwent partial mastectomy in December and is planned for radiation and hormonal therapy soon. Her ENS1 panel came back negative including negative CV2 antibody. Her ALC and IgG levels remain normal. Her breast oncologist had no objection to rituximab. she was wondering if she should proceed with radiation therapy and covid vaccination. I explained that she should proceed with radiation and hormonal therapy as recommended by the oncologist as these do not have any known implications on her NMOSD or rituximab therapy. I discussed COVID19 vaccine implications.      08/09/2021: After radiation for breast cancer, she started having multiple symptoms including blurring of vision in the right eye, right eye pain, increased  headaches, dizziness (leading one time to a fall and inability to recover), and worsening body spasms and bladder symptoms. she is concerned that radiation might have triggered an NMOSD relapse. Her last rituximab dose was in January of 2021 and she competed her second dose of the pfizer covid vaccine exactly four weeks ago. She is scheduled for her next rituximab infusion this Friday. OCT shows stable RNFL thickness OD compared to the last value documented by Dr. Jaime (68 compared to 66). Unlikely to be having optic neuritis but will get a brain MRi to evaluate her dizzy episode given her hx of breast cancer and NMOSD. will check spike antibody.      02/07/2022: MRI brain was unremarkable. No new symptoms but has ongoing fatigue. still thinking about going on hormonal therapy for her breast cancer as recommended by her oncologist. She is worried about her IgG trending down althought it is still in the normal range but because she cannot take blood products including IVIG if she is to develop hypogammglobulinemia, she is wondering if we should consider half dose or extended interval between rituximab doses. all are reasonable options for her but only if she develops recurrent infection or critical IgG levels. For now will keep the same regimen. she is interested in joining the new NMOSD registry. She received her third dose of the Pfizer COVID vaccine and wants to check the antibody level again to decide whether or not she'll take the booster (fourth dose for her).      09/13/2022: last blood work showed decrease of IGG to below the lower limit of normal range (680). she also had a prolonged shingles infection in the her left breast and back area. this happened in June four months after her rituximab dose requiring prolonged valtrex course. This has since cleared but she is now interested in decreasing rituximab dose, which is reasonable. reports new left lateral thigh numbness (likely meralgia paraesthetica).  ronald carney, which is also reasonable.      03/14/2023: she was admitted to the hospital in January with complicated COVID19 requiring oxygen therapy. she was found to have COVID19 PNA and was also found to have pneumonitis thought to be related to her radiation therapy or rituximab. she is much better now but is thought to have long COVID19 with persistent cough. Her repeat Ig level in February went down to 440 but her ALC was normal at 2.2. We held her last rituximab dose in February (originally planned to be half dose). she reports some worsening of pre-existing visual and sensory symptoms during all this especially when taking hot showers. will repeat Ig level and if above 500, will resume rituximab at half dose. If still low, we will have to consider IVIG replacement if she allows it from the Rastafarian standpoint. she enrolled in [employer].    10/2/2023: IgG levels improved to normal after decreasing rituximab dose without early B cell repletion. No more serious infections (only minor cold). Complains of fatigue and spontaneous hives. Had many other questions that I answered to the best of my ability. Will continue reduced dose rituximab and will add wellbutrin for fatigue. Will also refer her to dermatology.     3/21/2024: IgG levels and ALC remain normal on half dose rituximab. No more serious infections (only minor COVID19). Saw dermatology. Never started wellbutrin and continues with fatigue. Has itching on the neck and continued left eye pain.     9/23/2024: IgG level decreased slightly. ALC remain normal on half dose rituximab. No serious infections. Never started wellbutrin and continues with fatigue and low mood. Has itching on the neck and continued left eye pain, headaches, and occasional wavy vision and glare.         Plan:  - Acute relapse management: not indicated      - DMT: continue rituximab at half dose.      - Will check CBC diff, IgG,and B cell phenotypes with each infusion.      -  Symptomatic management: wellbutrin to address fatigue.      - Vitamin D: Continue 45926 units a week.      - Smoking: not smoking currently      - PT/OT: no needs      - Instructions: keep an active life style and develop exercise routine as tolerated. Recommend a balanced healthy diet. Avoid excessive amounts of salt, carbs, fat, and red meat. Increase intake of fruits, vegetables, and white meat.      - Follow up: in 6 months    Reji Ryan MD       9/23/2024

## 2024-10-01 ENCOUNTER — LAB (OUTPATIENT)
Dept: LAB | Facility: LAB | Age: 74
End: 2024-10-01
Payer: MEDICARE

## 2024-10-01 ENCOUNTER — APPOINTMENT (OUTPATIENT)
Dept: OPHTHALMOLOGY | Facility: CLINIC | Age: 74
End: 2024-10-01
Payer: MEDICARE

## 2024-10-01 DIAGNOSIS — R51.9 CHRONIC NONINTRACTABLE HEADACHE, UNSPECIFIED HEADACHE TYPE: ICD-10-CM

## 2024-10-01 DIAGNOSIS — E05.00 GRAVES DISEASE: ICD-10-CM

## 2024-10-01 DIAGNOSIS — G89.29 CHRONIC NONINTRACTABLE HEADACHE, UNSPECIFIED HEADACHE TYPE: ICD-10-CM

## 2024-10-01 DIAGNOSIS — G47.19 EXCESSIVE DAYTIME SLEEPINESS: ICD-10-CM

## 2024-10-01 DIAGNOSIS — G36.0 NEUROMYELITIS OPTICA (MULTI): Primary | ICD-10-CM

## 2024-10-01 DIAGNOSIS — Z79.899 IMMUNODEFICIENCY DUE TO DRUG THERAPY (MULTI): ICD-10-CM

## 2024-10-01 DIAGNOSIS — D84.821 IMMUNODEFICIENCY DUE TO DRUG THERAPY (MULTI): ICD-10-CM

## 2024-10-01 LAB
BASOPHILS # BLD AUTO: 0.04 X10*3/UL (ref 0–0.1)
BASOPHILS NFR BLD AUTO: 0.9 %
CRP SERPL-MCNC: 0.49 MG/DL
EOSINOPHIL # BLD AUTO: 0.11 X10*3/UL (ref 0–0.4)
EOSINOPHIL NFR BLD AUTO: 2.5 %
ERYTHROCYTE [DISTWIDTH] IN BLOOD BY AUTOMATED COUNT: 13.2 % (ref 11.5–14.5)
ERYTHROCYTE [SEDIMENTATION RATE] IN BLOOD BY WESTERGREN METHOD: 13 MM/H (ref 0–30)
HCT VFR BLD AUTO: 46.8 % (ref 36–46)
HGB BLD-MCNC: 15.4 G/DL (ref 12–16)
IMM GRANULOCYTES # BLD AUTO: 0.01 X10*3/UL (ref 0–0.5)
IMM GRANULOCYTES NFR BLD AUTO: 0.2 % (ref 0–0.9)
LYMPHOCYTES # BLD AUTO: 0.67 X10*3/UL (ref 0.8–3)
LYMPHOCYTES NFR BLD AUTO: 15.1 %
MCH RBC QN AUTO: 30.1 PG (ref 26–34)
MCHC RBC AUTO-ENTMCNC: 32.9 G/DL (ref 32–36)
MCV RBC AUTO: 91 FL (ref 80–100)
MONOCYTES # BLD AUTO: 0.6 X10*3/UL (ref 0.05–0.8)
MONOCYTES NFR BLD AUTO: 13.5 %
NEUTROPHILS # BLD AUTO: 3.02 X10*3/UL (ref 1.6–5.5)
NEUTROPHILS NFR BLD AUTO: 67.8 %
NRBC BLD-RTO: 0 /100 WBCS (ref 0–0)
PLATELET # BLD AUTO: 257 X10*3/UL (ref 150–450)
RBC # BLD AUTO: 5.12 X10*6/UL (ref 4–5.2)
WBC # BLD AUTO: 4.5 X10*3/UL (ref 4.4–11.3)

## 2024-10-01 PROCEDURE — 88185 FLOWCYTOMETRY/TC ADD-ON: CPT

## 2024-10-01 PROCEDURE — 36415 COLL VENOUS BLD VENIPUNCTURE: CPT

## 2024-10-01 PROCEDURE — 85652 RBC SED RATE AUTOMATED: CPT

## 2024-10-01 PROCEDURE — 92133 CPTRZD OPH DX IMG PST SGM ON: CPT | Performed by: PSYCHIATRY & NEUROLOGY

## 2024-10-01 PROCEDURE — 88184 FLOWCYTOMETRY/ TC 1 MARKER: CPT

## 2024-10-01 PROCEDURE — 85025 COMPLETE CBC W/AUTO DIFF WBC: CPT

## 2024-10-01 PROCEDURE — 84445 ASSAY OF TSI GLOBULIN: CPT

## 2024-10-01 PROCEDURE — 99214 OFFICE O/P EST MOD 30 MIN: CPT | Performed by: PSYCHIATRY & NEUROLOGY

## 2024-10-01 PROCEDURE — 88187 FLOWCYTOMETRY/READ 2-8: CPT | Performed by: PSYCHIATRY & NEUROLOGY

## 2024-10-01 PROCEDURE — 92083 EXTENDED VISUAL FIELD XM: CPT | Performed by: PSYCHIATRY & NEUROLOGY

## 2024-10-01 PROCEDURE — 83519 RIA NONANTIBODY: CPT

## 2024-10-01 PROCEDURE — 86140 C-REACTIVE PROTEIN: CPT

## 2024-10-01 ASSESSMENT — REFRACTION_WEARINGRX
OD_ADD: +2.50
OS_AXIS: 001
OD_CYLINDER: -5.25
OD_AXIS: 074
OS_CYLINDER: -5.25
OS_SPHERE: +0.25
OD_SPHERE: +0.25
SPECS_TYPE: PAL
OS_ADD: +2.50

## 2024-10-01 ASSESSMENT — ENCOUNTER SYMPTOMS
MUSCULOSKELETAL NEGATIVE: 0
PSYCHIATRIC NEGATIVE: 0
ALLERGIC/IMMUNOLOGIC NEGATIVE: 0
NEUROLOGICAL NEGATIVE: 0
GASTROINTESTINAL NEGATIVE: 0
HEMATOLOGIC/LYMPHATIC NEGATIVE: 0
CARDIOVASCULAR NEGATIVE: 0
RESPIRATORY NEGATIVE: 0
EYES NEGATIVE: 1
CONSTITUTIONAL NEGATIVE: 0
ENDOCRINE NEGATIVE: 0

## 2024-10-01 ASSESSMENT — TONOMETRY
OS_IOP_MMHG: 16
IOP_METHOD: GOLDMANN APPLANATION
OD_IOP_MMHG: 17

## 2024-10-01 ASSESSMENT — PACHYMETRY
OS_CT(UM): 580
OD_CT(UM): 589

## 2024-10-01 ASSESSMENT — SLIT LAMP EXAM - LIDS
COMMENTS: NORMAL
COMMENTS: NORMAL

## 2024-10-01 ASSESSMENT — CONF VISUAL FIELD
OS_SUPERIOR_TEMPORAL_RESTRICTION: 1
OS_SUPERIOR_NASAL_RESTRICTION: 1
OS_INFERIOR_NASAL_RESTRICTION: 1
OS_INFERIOR_TEMPORAL_RESTRICTION: 1

## 2024-10-01 ASSESSMENT — EXTERNAL EXAM - LEFT EYE: OS_EXAM: NORMAL

## 2024-10-01 ASSESSMENT — VISUAL ACUITY
OD_CC: 20/20
METHOD: SNELLEN - LINEAR
OS_CC: 20/HM

## 2024-10-01 ASSESSMENT — CUP TO DISC RATIO: OD_RATIO: 0.4

## 2024-10-01 ASSESSMENT — EXTERNAL EXAM - RIGHT EYE: OD_EXAM: NORMAL

## 2024-10-01 NOTE — PROGRESS NOTES
"Assessment and Plan    08/17/2021 Rashad-Hallpike negative bilateral.    11/10/2014 height 5`3\" & weight 228 pounds for BMI 40.    03/21/2018 MRI brain & orbits with contrast, which I personally reviewed previously, shows stable non-specific subcortical white matter changes on FLAIR.  10/12/2017 MRI cervical & thoracic spine, by report, shows “There is abnormal increased intramedullary signal on the T2 weighted images noted within the cervical cord centered posteriorly along the midline at the C5 level. There is the suggestion of mild cervical cord volume loss at this level. There is no corresponding abnormal enhancement on the postcontrast images. Considerations could include a focal area of myelomalacia or conceivably sequelae of a demyelinating process.”  10/10/2017 MRI brain & orbits with contrast, which I personally reviewed previously, shows right > left enhancement of the optic nerves near the orbital apices along with increased STIR signal. There is a left lentiform nucleus FLAIR hyperintense lesion likely lacunar infarction along with periventricular white matter likely consistent with ischemia.    10/27/2017 lumbar puncture tube 1: RBC 98357, WBC 51 (78% N, 17% L), tube 4: RBC 9000, WBC 14 (91%, N, 9% L, 6% M), protein 162, glucose 82. Elevated IgG synthesis, but complicated by elevated CSF albumin. Oligoclonal bands negative.    11/23/2020 autoimmune encephalopathy panel negative.  12/04/2019 ESR 18. CRP 0.25 mg/dL.  11/14/2018 ESR 19. CRP 0.49 mg/dL. NMO, MOG negative.  08/15/2018 B12 352.  11/22/2017 JCV positive.  10/25/2017 NMO antibody negative. MOG IgG negative. Anti-MAG negative. Anti-SGPG negative.  10/11/2017 B12 455. Quantiferon negative.  10/10/2017 ESR 10, CRP 0.31, syphilis IgG NR.    10/01/2024 OCT RNFL OD 64. (Stable)  04/02/2024 OCT RNFL OD 65. (stable)  09/19/2023 OCT RNFL OD 66. (stable)  03/24/2023 OCT RNFL OD 66. (stable)  09/23/2022 OCT RNFL OD 65. (stable)  03/18/2022 OCT RNFL OD 64. " (stalbe)  08/17/2021 OCT RNFL OD 66. (stable)  03/26/2021 OCT RNFL OD 66. (stable)  06/19/2020 OCT RNFL OD 66. (stable)  12/30/2019 OCT RNFL OD 65. (stable)  06/21/2019 OCT RNFL OD 68. (stable)  12/21/2018 OCT RNFL OD 68.  07/17/2018 OCT RNFL OD 70 with T & borderline N thinning.  04/20/2018 OCT RNFL OD 68 with T & borderline N & I thinning.  02/23/2018 OCT RNFL OD 63 with falsely low algorithm failure nasally.  12/07/2017 OCT RNFL OD 83.  11/10/2017 OCT RNFL OD 89.  10/24/2017 OCT RNFL OD 84.  10/09/2017 OCT RNFL OD 85.  OCT macula OD normal contour 262.  10/03/2017 OCT RNFL OD 85.  OCT macula OD normal contour 270.    10/01/2024 HVF 24-2 OD fovea 32, wnl MD +0.19.  04/02/2024 HVF 24-2 OD fovea 32, scatter MD -0.39.  09/19/2023 HVF 24-2 OD fovea 32, wnl MD -1.13.  03/24/2023 HVF 24-2 OD fovea 30, scatter MD -1.02.  09/23/2022 HVF 24-2 OD fovea 31, wnl MD -0.49.  03/18/2022 HVF 24-2 OD fovea 32, scatter MD -0.10.  08/17/2021 HVF 24-2 OD fovea 31, possible enlarged blind spot versus scatter MD -0.89.  03/26/2021 HVF 24-2 OD fovea 31, wnl MD +0.91.  06/19/2020 HVF 24-2 OD fovea 32, wnl MD +0.64.  12/30/2019 HVF 24-2 OD fovea 34, wnl MD +0.14.  06/21/2019 HVF 24-2 OD fovea 32, wnl MD +0.23.  12/21/2018 HVF 24-2 OD fovea 32, wnl MD +0.41.  07/17/2018 HVF 24-2 OD fovea 35, wnl MD -0.23.  04/20/2018 HVF 24-2 OD fovea 31, wnl MD -1.01.  02/23/2018 HVF 24-2 OD fovea 29, wnl MD -1.81.  12/07/2017 HVF 24-2 OD wnl, fovea 27 dB MD -0.99.  11/10/2017 HVF 24-2 OD FP 0%, FN 1%, central scotoma, fovea 28, MD 2.83.  11/02/2017 HVF 24-2 OD FP 5%, FN 0%, central scotoma, fovea 13, MD -6.18.  10/24/2017 HVF 24-2 FP 3%, FN 8%, cecocentral scotoma.  10/09/2017 HVF 30-2 FP 11%, FN N/A, generalized depression with relatively preserved central island MD -26.58.    This 73 year-old woman, Jehovah`s witness, with a history of neuromyelitis optica spectrum disease, congenital cataract OS with blindness OS, transverse myelitis 3/2006, RA,  hepatitis B & C, HTN, Graves / hypothyroidism, obesity, former smoker presents in follow up for right optic neuritis.    Today's examination remains stable including Lee visual field (HVF) and OCT retinal nerve fiber layer testing. These findings are consistent with sustained control of neuromyelitis optica spectrum disease.    Her headaches are suspicious for sleep apnea. I also considered giant cell arteritis. The possibility of giant cell arteritis seems low, but worth testing ESR & CRP. I do recommend sleep evaluation.    Her vision episodes remain consistent with late life migraine accompaniment. I still do not think they are stroke or seizure given positive symptoms.    Thyroid eye disease remains stable, but some tenderness could be related. I will check antibody studies.    Plan    Check ESR & CRP regarding headaches.  Pursue sleep study.  Check TSIg & TRAb.  Conitnue rituximab with dosing per Dr. Reji Ryan.  Refractive care with optometry.  Pursue sleep study.    Follow up in 6 months with HVF & OCT. (dilation 4/2/2024)

## 2024-10-03 LAB
CD19 CELLS # BLD: 0 X10E9/L
CD19 CELLS NFR BLD: 0 %
FLOW CYTOMETRY SPECIALIST REVIEW: ABNORMAL
LYMPHOCYTES # SPEC AUTO: 0.67 X10*3/UL
PATH REVIEW, B CELL PHENOTYPING, EXTENDED: ABNORMAL
TSH RECEP AB SER-ACNC: <1.1 IU/L

## 2024-10-04 ENCOUNTER — TELEPHONE (OUTPATIENT)
Dept: SLEEP MEDICINE | Facility: HOSPITAL | Age: 74
End: 2024-10-04
Payer: MEDICARE

## 2024-10-04 ENCOUNTER — APPOINTMENT (OUTPATIENT)
Dept: HEMATOLOGY/ONCOLOGY | Facility: CLINIC | Age: 74
End: 2024-10-04
Payer: MEDICARE

## 2024-10-04 NOTE — TELEPHONE ENCOUNTER
Patient was recently ordered a home sleep study to be mailed to her home. Patient is asking Will if she is to test using her oral appliance.     I did not see a note in patients chart that she was to test using that.     Please be advised.     Rhonda

## 2024-10-11 ENCOUNTER — INFUSION (OUTPATIENT)
Dept: HEMATOLOGY/ONCOLOGY | Facility: CLINIC | Age: 74
End: 2024-10-11
Payer: MEDICARE

## 2024-10-11 VITALS
WEIGHT: 181 LBS | SYSTOLIC BLOOD PRESSURE: 126 MMHG | BODY MASS INDEX: 32.07 KG/M2 | DIASTOLIC BLOOD PRESSURE: 59 MMHG | HEIGHT: 63 IN | RESPIRATION RATE: 20 BRPM | HEART RATE: 86 BPM | OXYGEN SATURATION: 95 % | TEMPERATURE: 97.7 F

## 2024-10-11 DIAGNOSIS — D84.821 DRUG-INDUCED IMMUNODEFICIENCY (MULTI): ICD-10-CM

## 2024-10-11 DIAGNOSIS — G36.0 NEUROMYELITIS OPTICA (MULTI): ICD-10-CM

## 2024-10-11 DIAGNOSIS — Z79.899 DRUG-INDUCED IMMUNODEFICIENCY (MULTI): ICD-10-CM

## 2024-10-11 PROCEDURE — 96375 TX/PRO/DX INJ NEW DRUG ADDON: CPT | Mod: INF

## 2024-10-11 PROCEDURE — 96415 CHEMO IV INFUSION ADDL HR: CPT

## 2024-10-11 PROCEDURE — 96413 CHEMO IV INFUSION 1 HR: CPT

## 2024-10-11 PROCEDURE — 2500000001 HC RX 250 WO HCPCS SELF ADMINISTERED DRUGS (ALT 637 FOR MEDICARE OP): Performed by: PSYCHIATRY & NEUROLOGY

## 2024-10-11 PROCEDURE — 2500000004 HC RX 250 GENERAL PHARMACY W/ HCPCS (ALT 636 FOR OP/ED): Performed by: PSYCHIATRY & NEUROLOGY

## 2024-10-11 RX ORDER — ACETAMINOPHEN 325 MG/1
650 TABLET ORAL ONCE
OUTPATIENT
Start: 2025-03-31 | End: 2025-03-31

## 2024-10-11 RX ORDER — ACETAMINOPHEN 325 MG/1
650 TABLET ORAL ONCE
Status: COMPLETED | OUTPATIENT
Start: 2024-10-11 | End: 2024-10-11

## 2024-10-11 RX ORDER — FAMOTIDINE 10 MG/ML
20 INJECTION INTRAVENOUS ONCE AS NEEDED
OUTPATIENT
Start: 2025-03-31

## 2024-10-11 RX ORDER — DIPHENHYDRAMINE HYDROCHLORIDE 50 MG/ML
50 INJECTION INTRAMUSCULAR; INTRAVENOUS AS NEEDED
OUTPATIENT
Start: 2025-03-31

## 2024-10-11 RX ORDER — EPINEPHRINE 0.3 MG/.3ML
0.3 INJECTION SUBCUTANEOUS EVERY 5 MIN PRN
OUTPATIENT
Start: 2025-03-31

## 2024-10-11 RX ORDER — DIPHENHYDRAMINE HYDROCHLORIDE 50 MG/ML
25 INJECTION INTRAMUSCULAR; INTRAVENOUS ONCE
OUTPATIENT
Start: 2025-03-31 | End: 2025-03-31

## 2024-10-11 RX ORDER — DIPHENHYDRAMINE HYDROCHLORIDE 50 MG/ML
25 INJECTION INTRAMUSCULAR; INTRAVENOUS ONCE
Status: COMPLETED | OUTPATIENT
Start: 2024-10-11 | End: 2024-10-11

## 2024-10-11 RX ORDER — ALBUTEROL SULFATE 0.83 MG/ML
3 SOLUTION RESPIRATORY (INHALATION) AS NEEDED
OUTPATIENT
Start: 2025-03-31

## 2024-10-11 ASSESSMENT — PAIN SCALES - GENERAL
PAINLEVEL: 0-NO PAIN
PAINLEVEL_OUTOF10: 0 - NO PAIN

## 2024-10-16 ENCOUNTER — SPECIALTY PHARMACY (OUTPATIENT)
Dept: PHARMACY | Facility: CLINIC | Age: 74
End: 2024-10-16

## 2024-10-16 LAB — TSI SER-ACNC: <1 TSI INDEX

## 2024-10-16 PROCEDURE — RXMED WILLOW AMBULATORY MEDICATION CHARGE

## 2024-10-21 ENCOUNTER — PHARMACY VISIT (OUTPATIENT)
Dept: PHARMACY | Facility: CLINIC | Age: 74
End: 2024-10-21
Payer: COMMERCIAL

## 2024-10-29 ENCOUNTER — OFFICE VISIT (OUTPATIENT)
Dept: SURGICAL ONCOLOGY | Facility: HOSPITAL | Age: 74
End: 2024-10-29
Payer: MEDICARE

## 2024-10-29 ENCOUNTER — HOSPITAL ENCOUNTER (OUTPATIENT)
Dept: RADIOLOGY | Facility: HOSPITAL | Age: 74
Discharge: HOME | End: 2024-10-29
Payer: MEDICARE

## 2024-10-29 VITALS
SYSTOLIC BLOOD PRESSURE: 109 MMHG | BODY MASS INDEX: 31.89 KG/M2 | HEART RATE: 90 BPM | DIASTOLIC BLOOD PRESSURE: 57 MMHG | RESPIRATION RATE: 16 BRPM | HEIGHT: 63 IN | WEIGHT: 180 LBS

## 2024-10-29 DIAGNOSIS — Z08 ENCOUNTER FOR FOLLOW-UP SURVEILLANCE OF BREAST CANCER: Primary | ICD-10-CM

## 2024-10-29 DIAGNOSIS — N64.4 BREAST PAIN, LEFT: ICD-10-CM

## 2024-10-29 DIAGNOSIS — Z85.3 ENCOUNTER FOR FOLLOW-UP SURVEILLANCE OF BREAST CANCER: Primary | ICD-10-CM

## 2024-10-29 DIAGNOSIS — Z12.31 SCREENING MAMMOGRAM FOR BREAST CANCER: ICD-10-CM

## 2024-10-29 DIAGNOSIS — Z12.31 ENCOUNTER FOR SCREENING MAMMOGRAM FOR MALIGNANT NEOPLASM OF BREAST: ICD-10-CM

## 2024-10-29 PROCEDURE — 1157F ADVNC CARE PLAN IN RCRD: CPT | Performed by: NURSE PRACTITIONER

## 2024-10-29 PROCEDURE — 77067 SCR MAMMO BI INCL CAD: CPT

## 2024-10-29 PROCEDURE — 77063 BREAST TOMOSYNTHESIS BI: CPT | Performed by: RADIOLOGY

## 2024-10-29 PROCEDURE — 99213 OFFICE O/P EST LOW 20 MIN: CPT | Performed by: NURSE PRACTITIONER

## 2024-10-29 PROCEDURE — 1159F MED LIST DOCD IN RCRD: CPT | Performed by: NURSE PRACTITIONER

## 2024-10-29 PROCEDURE — 3008F BODY MASS INDEX DOCD: CPT | Performed by: NURSE PRACTITIONER

## 2024-10-29 PROCEDURE — 77067 SCR MAMMO BI INCL CAD: CPT | Performed by: RADIOLOGY

## 2024-10-29 PROCEDURE — 3074F SYST BP LT 130 MM HG: CPT | Performed by: NURSE PRACTITIONER

## 2024-10-29 PROCEDURE — 3078F DIAST BP <80 MM HG: CPT | Performed by: NURSE PRACTITIONER

## 2024-11-01 NOTE — TELEPHONE ENCOUNTER
I received a message that patient called into [facility] and cancelled the order. Patient stated that she has too many health issues going on right now and not certain if she wants to test anymore.     Please be advised.     Rhonda

## 2024-11-05 ENCOUNTER — HOSPITAL ENCOUNTER (OUTPATIENT)
Dept: RADIOLOGY | Facility: HOSPITAL | Age: 74
Discharge: HOME | End: 2024-11-05
Payer: MEDICARE

## 2024-11-05 DIAGNOSIS — N64.4 BREAST PAIN: ICD-10-CM

## 2024-11-05 PROCEDURE — 76981 USE PARENCHYMA: CPT | Mod: LT

## 2024-11-05 PROCEDURE — 76642 ULTRASOUND BREAST LIMITED: CPT | Mod: LT

## 2024-11-05 PROCEDURE — 76642 ULTRASOUND BREAST LIMITED: CPT | Mod: LEFT SIDE | Performed by: STUDENT IN AN ORGANIZED HEALTH CARE EDUCATION/TRAINING PROGRAM

## 2024-11-13 ENCOUNTER — OFFICE VISIT (OUTPATIENT)
Dept: PRIMARY CARE | Facility: CLINIC | Age: 74
End: 2024-11-13
Payer: MEDICARE

## 2024-11-13 VITALS
HEART RATE: 96 BPM | BODY MASS INDEX: 30.56 KG/M2 | OXYGEN SATURATION: 96 % | DIASTOLIC BLOOD PRESSURE: 60 MMHG | HEIGHT: 64 IN | SYSTOLIC BLOOD PRESSURE: 100 MMHG | WEIGHT: 179 LBS

## 2024-11-13 DIAGNOSIS — B96.89 BACTERIAL SINUSITIS: ICD-10-CM

## 2024-11-13 DIAGNOSIS — J32.9 BACTERIAL SINUSITIS: ICD-10-CM

## 2024-11-13 DIAGNOSIS — R09.89 CHEST CONGESTION: Primary | ICD-10-CM

## 2024-11-13 PROCEDURE — 1157F ADVNC CARE PLAN IN RCRD: CPT | Performed by: FAMILY MEDICINE

## 2024-11-13 PROCEDURE — 1159F MED LIST DOCD IN RCRD: CPT | Performed by: FAMILY MEDICINE

## 2024-11-13 PROCEDURE — 1036F TOBACCO NON-USER: CPT | Performed by: FAMILY MEDICINE

## 2024-11-13 PROCEDURE — 99214 OFFICE O/P EST MOD 30 MIN: CPT | Performed by: FAMILY MEDICINE

## 2024-11-13 PROCEDURE — 3008F BODY MASS INDEX DOCD: CPT | Performed by: FAMILY MEDICINE

## 2024-11-13 PROCEDURE — 3074F SYST BP LT 130 MM HG: CPT | Performed by: FAMILY MEDICINE

## 2024-11-13 PROCEDURE — 3078F DIAST BP <80 MM HG: CPT | Performed by: FAMILY MEDICINE

## 2024-11-13 RX ORDER — PREDNISONE 20 MG/1
40 TABLET ORAL DAILY
Qty: 10 TABLET | Refills: 0 | Status: SHIPPED | OUTPATIENT
Start: 2024-11-13 | End: 2024-11-18

## 2024-11-13 RX ORDER — FAMOTIDINE 20 MG/1
20 TABLET, FILM COATED ORAL DAILY
COMMUNITY
Start: 2024-11-07

## 2024-11-13 RX ORDER — FLUTICASONE PROPIONATE 50 MCG
2 SPRAY, SUSPENSION (ML) NASAL DAILY
COMMUNITY
Start: 2024-11-07

## 2024-11-13 RX ORDER — AMOXICILLIN AND CLAVULANATE POTASSIUM 875; 125 MG/1; MG/1
875 TABLET, FILM COATED ORAL 2 TIMES DAILY
Qty: 20 TABLET | Refills: 0 | Status: SHIPPED | OUTPATIENT
Start: 2024-11-13 | End: 2024-11-23

## 2024-11-13 RX ORDER — PROMETHAZINE HYDROCHLORIDE AND DEXTROMETHORPHAN HYDROBROMIDE 6.25; 15 MG/5ML; MG/5ML
5 SYRUP ORAL EVERY 4 HOURS PRN
Qty: 120 ML | Refills: 0 | Status: SHIPPED | OUTPATIENT
Start: 2024-11-13 | End: 2024-12-13

## 2024-11-13 ASSESSMENT — ENCOUNTER SYMPTOMS
HEADACHES: 0
DIZZINESS: 0
SHORTNESS OF BREATH: 0
FATIGUE: 1
SINUS PRESSURE: 1
FEVER: 0
COUGH: 1
CHILLS: 1
SINUS PAIN: 1
ACTIVITY CHANGE: 0

## 2024-11-13 NOTE — PROGRESS NOTES
"Subjective   Patient ID: Kristina Lundberg is a 74 y.o. female who presents for Sick Visit (Cough and sinus for a week).    Upper respiratory symptoms   - reports she did have exposure to someone with pneumonia at the end of October   - has been feeling progressively more tired, short of breath and congested   - requiring albuterol inhaler nightly to help with her sleep   - has been coughing, cough is productive of mucous   - does have a history of bronchospasm and has been requiring her albuterol nightly   - has been trying OTC medication  without significant improvement   - reports cough is generally worse at night and in the morning   - has a history of covid pneumonia, but no known covid exposure at this time   - denies any significant wheezing   - has been having chills at night, but denies any significant fevers          Review of Systems   Constitutional:  Positive for chills and fatigue. Negative for activity change and fever.   HENT:  Positive for sinus pressure and sinus pain.    Respiratory:  Positive for cough. Negative for shortness of breath.    Cardiovascular:  Negative for chest pain.   Neurological:  Negative for dizziness and headaches.       Objective   /60   Pulse 96   Ht 1.613 m (5' 3.5\")   Wt 81.2 kg (179 lb)   SpO2 96%   BMI 31.21 kg/m²     Physical Exam  Constitutional:       Appearance: Normal appearance.   Cardiovascular:      Rate and Rhythm: Normal rate and regular rhythm.   Pulmonary:      Effort: Pulmonary effort is normal.      Breath sounds: Normal breath sounds.   Neurological:      Mental Status: She is alert.   Psychiatric:         Mood and Affect: Mood normal.         Behavior: Behavior normal.         Assessment/Plan   Problem List Items Addressed This Visit    None  Visit Diagnoses         Codes    Chest congestion    -  Primary R09.89    stable  - add on prednisone and antibiotic given symptoms   - consider CXR if not improving     Relevant Medications    " amoxicillin-pot clavulanate (Augmentin) 875-125 mg tablet    predniSONE (Deltasone) 20 mg tablet    promethazine-DM (Phenergan-DM) 6.25-15 mg/5 mL syrup    Bacterial sinusitis     J32.9, B96.89    stable   - tx with oral augmentin   - symptomatic management of cough   - f/u PRN     Relevant Medications    amoxicillin-pot clavulanate (Augmentin) 875-125 mg tablet    predniSONE (Deltasone) 20 mg tablet    promethazine-DM (Phenergan-DM) 6.25-15 mg/5 mL syrup                Detail Level: Detailed Quality 431: Preventive Care And Screening: Unhealthy Alcohol Use - Screening: Patient not identified as an unhealthy alcohol user when screened for unhealthy alcohol use using a systematic screening method Quality 226: Preventive Care And Screening: Tobacco Use: Screening And Cessation Intervention: Patient screened for tobacco use and is an ex/non-smoker

## 2024-12-06 ENCOUNTER — TELEPHONE (OUTPATIENT)
Dept: PRIMARY CARE | Facility: CLINIC | Age: 74
End: 2024-12-06
Payer: MEDICARE

## 2024-12-10 ENCOUNTER — APPOINTMENT (OUTPATIENT)
Dept: PRIMARY CARE | Facility: CLINIC | Age: 74
End: 2024-12-10
Payer: MEDICARE

## 2024-12-10 VITALS
DIASTOLIC BLOOD PRESSURE: 69 MMHG | OXYGEN SATURATION: 92 % | WEIGHT: 163 LBS | SYSTOLIC BLOOD PRESSURE: 109 MMHG | HEART RATE: 95 BPM | BODY MASS INDEX: 27.83 KG/M2 | HEIGHT: 64 IN

## 2024-12-10 DIAGNOSIS — J18.9 PNEUMONIA DUE TO INFECTIOUS ORGANISM, UNSPECIFIED LATERALITY, UNSPECIFIED PART OF LUNG: Primary | ICD-10-CM

## 2024-12-10 PROCEDURE — 3074F SYST BP LT 130 MM HG: CPT | Performed by: FAMILY MEDICINE

## 2024-12-10 PROCEDURE — 1036F TOBACCO NON-USER: CPT | Performed by: FAMILY MEDICINE

## 2024-12-10 PROCEDURE — 1123F ACP DISCUSS/DSCN MKR DOCD: CPT | Performed by: FAMILY MEDICINE

## 2024-12-10 PROCEDURE — 3078F DIAST BP <80 MM HG: CPT | Performed by: FAMILY MEDICINE

## 2024-12-10 PROCEDURE — 1158F ADVNC CARE PLAN TLK DOCD: CPT | Performed by: FAMILY MEDICINE

## 2024-12-10 PROCEDURE — 1157F ADVNC CARE PLAN IN RCRD: CPT | Performed by: FAMILY MEDICINE

## 2024-12-10 PROCEDURE — 99214 OFFICE O/P EST MOD 30 MIN: CPT | Performed by: FAMILY MEDICINE

## 2024-12-10 PROCEDURE — 3008F BODY MASS INDEX DOCD: CPT | Performed by: FAMILY MEDICINE

## 2024-12-10 RX ORDER — CEFPODOXIME PROXETIL 200 MG/1
200 TABLET, FILM COATED ORAL 2 TIMES DAILY
Qty: 14 TABLET | Refills: 0 | Status: SHIPPED | OUTPATIENT
Start: 2024-12-10 | End: 2024-12-17

## 2024-12-10 RX ORDER — BENZONATATE 100 MG/1
100 CAPSULE ORAL 3 TIMES DAILY PRN
Qty: 42 CAPSULE | Refills: 0 | Status: SHIPPED | OUTPATIENT
Start: 2024-12-10 | End: 2025-01-09

## 2024-12-10 RX ORDER — DOXYCYCLINE 100 MG/1
100 CAPSULE ORAL 2 TIMES DAILY
Qty: 14 CAPSULE | Refills: 0 | Status: SHIPPED | OUTPATIENT
Start: 2024-12-10 | End: 2024-12-17

## 2024-12-10 ASSESSMENT — ENCOUNTER SYMPTOMS
ACTIVITY CHANGE: 0
FATIGUE: 0
FEVER: 0
HEADACHES: 0
SHORTNESS OF BREATH: 0
DIZZINESS: 0

## 2024-12-10 NOTE — ASSESSMENT & PLAN NOTE
unstable  - failure to tolerate augmentin  - add on cephalosporin and doxy   - recommend starting course of steroids   - recommend ER evaluation but patient declined   - ambulatory pulse ox monitoring   - f/u PRN

## 2024-12-10 NOTE — PROGRESS NOTES
"Subjective   Patient ID: Kristina Lundberg is a 74 y.o. female who presents for Sick Visit (Still coughing).    Short of breath  - reports she feels like she is not getting air in   - has been going on for approximately 1-2 weeks   - reports she has no strength, no energy   - has not been having fevers/chills   - reports she has been coughing   - was evaluated 3 weeks ago and diagnosed with pneumonia.  She was given steroids, augmentin and cough suppressant   - tried taking the augmentin for 3 days, but was not able to tolerate the medication   - did not take course of steroids   - has felt very weak, very short of breath and very nauseous   - has lost 15 pounds since her last office visit   - has not had an appetite and has not been eating          Review of Systems   Constitutional:  Negative for activity change, fatigue and fever.   Respiratory:  Negative for shortness of breath.    Cardiovascular:  Negative for chest pain.   Neurological:  Negative for dizziness and headaches.       Objective   /69   Pulse 95   Ht 1.613 m (5' 3.5\")   Wt 73.9 kg (163 lb)   SpO2 92%   BMI 28.42 kg/m²     Physical Exam  Constitutional:       Appearance: Normal appearance.   Cardiovascular:      Rate and Rhythm: Normal rate and regular rhythm.   Pulmonary:      Effort: Pulmonary effort is normal.      Breath sounds: Normal breath sounds.   Neurological:      Mental Status: She is alert.         Assessment/Plan   Problem List Items Addressed This Visit             ICD-10-CM    Pneumonia - Primary J18.9     unstable  - failure to tolerate augmentin  - add on cephalosporin and doxy   - recommend starting course of steroids   - recommend ER evaluation but patient declined   - ambulatory pulse ox monitoring   - f/u PRN          Relevant Medications    cefpodoxime (Vantin) 200 mg tablet    doxycycline (Vibramycin) 100 mg capsule    benzonatate (Tessalon) 100 mg capsule          "

## 2024-12-17 ENCOUNTER — TELEPHONE (OUTPATIENT)
Dept: PRIMARY CARE | Facility: CLINIC | Age: 74
End: 2024-12-17
Payer: MEDICARE

## 2024-12-17 NOTE — TELEPHONE ENCOUNTER
Susan would like to know if you would follow pt for Skilled nursing and PT.     She stated line is confidential.

## 2024-12-19 ENCOUNTER — SPECIALTY PHARMACY (OUTPATIENT)
Dept: INTERNAL MEDICINE | Facility: HOSPITAL | Age: 74
End: 2024-12-19
Payer: MEDICARE

## 2024-12-19 NOTE — PROGRESS NOTES
[facility] Clinical Note    Kristina Lundberg is a 74 y.o. female, who is on the specialty pharmacy service for management of:  Hepatitis B Core.    Kristina Lundberg is taking: entecavir 0.5 mg po daily.    Medication Receipt Date: several years  Medication Start Date (planned or actual): several years ago    Kristina was contacted on 12/19/2024 at 11:28 AM for a virtual pharmacy visit with encounter number [id number] from:   [facility]  [address]  VIRTUAL DEPARTMENT  [place] [address]  Loc: [phone]    Kristina was offered a Telemedicine Video visit or Telephone visit.  Kristina consented to a telephone visit, which was performed.    The most recent encounter visit with the referring prescriber Dr. Kim on 7/6/2022 was reviewed.  Pharmacy will continue to collaborate in the care of this patient with the referring prescriber Dr. Kim.    General Assessment      Impression/Plan  IMPRESSION/PLAN:  Is patient high risk (potential patients:  pregnancy, geriatric, pediatric)?  Yes, geriatric  Is laboratory follow-up needed? No, patient completed on 9/19/24  Is a clinical intervention needed? Yes, patient is overdue for followup with her provider  Next reassessment date? Approx 6/19/24  Additional comments: patient has not seen her provider since 2022. She states that she has a lot going on right now and is unable to make an appointment. Suggested that she schedule something in the New Year    Refer to the encounter summary report for documentation details about patient counseling and education.      Medication Adherence    The importance of adherence was discussed with the patient and they were advised to take the medication as prescribed by their provider. Patient was encouraged to call their physician's office if they have a question regarding a missed dose.     QOL/Patient Satisfaction  Rate your quality of life on scale of 1-10: 5 (patient is not  feeling well, is in the hospital)  Rate your satisfaction with  Specialty Pharmacy on scale of 1-10: 10 - Completely satisfied      Patient was advised to contact the pharmacy if there are any changes to their medication list, including prescriptions, OTC medications, herbal products, or supplements. Patient was advised of [facility]'s dispensing process, refill timeline, contact information ([phone]), and patient management follow up. Patient confirmed understanding of education conducted during assessment. All patient questions and concerns were addressed to the best of my ability. Patient was encouraged to contact the specialty pharmacy with any questions or concerns.    Confirmed follow-up outreaches are properly scheduled and reviewed goals of therapy with the patient.        Lab Results   Component Value Date    ALT 9 09/19/2024    AST 13 09/19/2024    BILIDIR 0.1 09/19/2024    HBVDNAPCRIUM NOT DETECTED 09/19/2023    HEPBSAG Nonreactive 09/19/2024    CREATININE 0.57 09/19/2024    GFRF >90 06/19/2023         Bhavani Pond, RebekahD

## 2025-01-06 ENCOUNTER — APPOINTMENT (OUTPATIENT)
Dept: PRIMARY CARE | Facility: CLINIC | Age: 75
End: 2025-01-06
Payer: MEDICARE

## 2025-01-16 ENCOUNTER — APPOINTMENT (OUTPATIENT)
Dept: PRIMARY CARE | Facility: CLINIC | Age: 75
End: 2025-01-16
Payer: MEDICARE

## 2025-01-16 VITALS
HEART RATE: 82 BPM | WEIGHT: 166 LBS | BODY MASS INDEX: 29.41 KG/M2 | DIASTOLIC BLOOD PRESSURE: 78 MMHG | TEMPERATURE: 97.8 F | SYSTOLIC BLOOD PRESSURE: 130 MMHG | HEIGHT: 63 IN

## 2025-01-16 DIAGNOSIS — R53.83 FATIGUE, UNSPECIFIED TYPE: ICD-10-CM

## 2025-01-16 DIAGNOSIS — G36.0 NEUROMYELITIS OPTICA (MULTI): ICD-10-CM

## 2025-01-16 DIAGNOSIS — J18.9 PNEUMONIA DUE TO INFECTIOUS ORGANISM, UNSPECIFIED LATERALITY, UNSPECIFIED PART OF LUNG: Primary | ICD-10-CM

## 2025-01-16 DIAGNOSIS — E03.9 HYPOTHYROIDISM, UNSPECIFIED: ICD-10-CM

## 2025-01-16 DIAGNOSIS — E55.9 VITAMIN D DEFICIENCY: ICD-10-CM

## 2025-01-16 DIAGNOSIS — R73.9 HYPERGLYCEMIA: ICD-10-CM

## 2025-01-16 DIAGNOSIS — E53.8 VITAMIN B12 DEFICIENCY: ICD-10-CM

## 2025-01-16 DIAGNOSIS — I10 BENIGN ESSENTIAL HYPERTENSION: ICD-10-CM

## 2025-01-16 PROCEDURE — 99214 OFFICE O/P EST MOD 30 MIN: CPT | Performed by: FAMILY MEDICINE

## 2025-01-16 PROCEDURE — 3075F SYST BP GE 130 - 139MM HG: CPT | Performed by: FAMILY MEDICINE

## 2025-01-16 PROCEDURE — 1123F ACP DISCUSS/DSCN MKR DOCD: CPT | Performed by: FAMILY MEDICINE

## 2025-01-16 PROCEDURE — 1159F MED LIST DOCD IN RCRD: CPT | Performed by: FAMILY MEDICINE

## 2025-01-16 PROCEDURE — 1157F ADVNC CARE PLAN IN RCRD: CPT | Performed by: FAMILY MEDICINE

## 2025-01-16 PROCEDURE — 1126F AMNT PAIN NOTED NONE PRSNT: CPT | Performed by: FAMILY MEDICINE

## 2025-01-16 PROCEDURE — 1036F TOBACCO NON-USER: CPT | Performed by: FAMILY MEDICINE

## 2025-01-16 PROCEDURE — 3008F BODY MASS INDEX DOCD: CPT | Performed by: FAMILY MEDICINE

## 2025-01-16 PROCEDURE — 3078F DIAST BP <80 MM HG: CPT | Performed by: FAMILY MEDICINE

## 2025-01-16 RX ORDER — FLUCONAZOLE 150 MG/1
150 TABLET ORAL ONCE
Qty: 3 TABLET | Refills: 0 | Status: SHIPPED | OUTPATIENT
Start: 2025-01-16 | End: 2025-01-16

## 2025-01-16 RX ORDER — LEVOTHYROXINE SODIUM 100 UG/1
100 TABLET ORAL DAILY
Qty: 90 TABLET | Refills: 3 | Status: SHIPPED | OUTPATIENT
Start: 2025-01-16 | End: 2026-01-16

## 2025-01-16 RX ORDER — VALSARTAN 40 MG/1
20 TABLET ORAL DAILY
Qty: 45 TABLET | Refills: 3 | Status: SHIPPED | OUTPATIENT
Start: 2025-01-16 | End: 2026-01-16

## 2025-01-16 ASSESSMENT — PAIN SCALES - GENERAL: PAINLEVEL_OUTOF10: 0-NO PAIN

## 2025-01-16 NOTE — PROGRESS NOTES
"    /78 (BP Location: Right arm, Patient Position: Sitting)   Pulse 82   Temp 36.6 °C (97.8 °F)   Ht 1.6 m (5' 3\")   Wt 75.3 kg (166 lb)   BMI 29.41 kg/m²     Past Medical History:   Diagnosis Date    Acute atopic conjunctivitis, bilateral 05/09/2018    Allergic conjunctivitis of both eyes    Acute transverse myelitis in demyelinating disease of central nervous system 11/20/2017    Transverse myelitis    Age-related nuclear cataract, right eye 12/20/2022    Age-related nuclear cataract of right eye    Breast cancer (Multi) 2021    Left    Chronic viral hepatitis C (Multi) 02/23/2018    Chronic hepatitis C without hepatic coma    Chronic viral hepatitis C (Multi) 04/20/2018    Chronic hepatitis C without hepatic coma    Chronic viral hepatitis C (Multi) 05/25/2018    Chronic hepatitis C without hepatic coma    Chronic viral hepatitis C (Multi) 07/17/2018    Chronic hepatitis C without hepatic coma    Congenital cataract     Congenital cataract    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    Neuromyelitis optica    Ocular pain, unspecified eye 03/10/2014    Eye pain    Other abnormal and inconclusive findings on diagnostic imaging of breast 11/03/2020    Abnormal finding on breast imaging    Other anomalies of pupillary function 06/21/2019    Relative afferent pupillary defect of right eye    Other microscopic hematuria     Microscopic hematuria    Other specified abnormal findings of blood chemistry     Abnormal liver function test    Other specified abnormal findings of blood chemistry 07/17/2018    Abnormal liver function test    Other specified abnormal immunological findings in serum 02/23/2018    Hepatitis B core antibody positive    Other specified abnormal immunological findings in serum 04/20/2018    Hepatitis B core antibody positive    Other specified abnormal immunological findings in serum 05/25/2018    Hepatitis B core antibody positive    Other specified abnormal immunological findings in serum " 07/17/2018    Hepatitis B core antibody positive    Other subjective visual disturbances 12/20/2022    Photopsia of right eye    Personal history of irradiation 2021    Left breast    Personal history of other diseases of the circulatory system     History of hypertension    Personal history of other diseases of the nervous system and sense organs 05/09/2018    History of acute conjunctivitis    Personal history of other diseases of the nervous system and sense organs 12/20/2022    History of acute conjunctivitis    Personal history of other diseases of the nervous system and sense organs 02/22/2013    History of cataract    Personal history of other endocrine, nutritional and metabolic disease     History of Graves' disease    Personal history of other infectious and parasitic diseases 11/13/2018    History of hepatitis C virus infection    Personal history of other specified conditions     History of abdominal pain    Personal history of other specified conditions 12/04/2019    History of headache    Pupillary abnormality, left eye 10/03/2017    Pupillary abnormality of left eye    Rheumatoid arthritis, unspecified     Rheumatoid arthritis    Spondylosis without myelopathy or radiculopathy, cervical region     Cervical spondylosis    Spondylosis without myelopathy or radiculopathy, cervical region 07/03/2014    Cervical spondylosis    Unspecified abdominal pain 04/20/2018    Abdominal pain    Unspecified abdominal pain 05/25/2018    Abdominal pain    Unspecified abdominal pain 07/17/2018    Abdominal pain    Unspecified optic neuritis 12/20/2022    Optic neuritis, right    Vitamin D deficiency, unspecified 07/17/2018    Vitamin D insufficiency       Patient Active Problem List   Diagnosis    Age-related nuclear cataract of right eye    Benign essential hypertension    Bilateral dry eyes    Bilateral keratitis    Blepharitis of both upper and lower eyelid of left eye    Central scotoma, right eye    Cervical  spondylosis    Chronic hepatitis C without hepatic coma (Multi)    Colon polyp    Conjunctival concretions of right eye    Dermatitis of face    Elevated IOP    Exposure to medical therapeutic radiation    Graves disease    Hepatitis B core antibody positive    Hepatitis C virus infection cured after antiviral drug therapy    Hip pain, right    Neuromyelitis optica (Multi)    On rituximab therapy    Pain, upper back    Photopsia of right eye    PVD (posterior vitreous detachment), right eye    Vitamin D deficiency    Hypothyroidism, unspecified    Steroid responder, bilateral    Status post bilateral breast reduction    History of adenomatous polyp of colon    Drug-induced immunodeficiency (Multi)    Acute disseminated demyelination, unspecified    Telogen effluvium    Transverse myelitis (Multi)    Discomfort of right ear    Class 1 obesity due to excess calories with serious comorbidity and body mass index (BMI) of 32.0 to 32.9 in adult    Incomplete uterovaginal prolapse    Bowel dysfunction    Cystocele, midline    Muscle weakness    OAB (overactive bladder)    Rectocele    Urge urinary incontinence    Urinary frequency    Urinary urgency    Thyroid nodule    Astigmatism of both eyes with presbyopia    Weakness    Shortness of breath at rest    Rheumatoid arthritis    Relative afferent pupillary defect of right eye    Pneumonia    Otitis media    Nausea    Microscopic hematuria    Malignant neoplasm of central portion of female breast    History of hypertension    History of Graves' disease    Headache    Female genital prolapse    Disease of nail    Disease due to severe acute respiratory syndrome coronavirus 2 (SARS-CoV-2)    Congenital cataract    Chest discomfort    Bronchospasm    Bladder outlet obstruction    Bilateral hydronephrosis    Acute conjunctivitis    Allergic rhinitis       Current Outpatient Medications   Medication Sig Dispense Refill    albuterol 2.5 mg /3 mL (0.083 %) nebulizer solution Take  3 mL (2.5 mg) by nebulization 4 times a day as needed for wheezing or shortness of breath.      albuterol 90 mcg/actuation inhaler Inhale 2 puffs every 6 hours if needed for shortness of breath. 18 g 3    artificial tears, dextran-hypomel-glycerin, 0.1-0.3-0.2 % ophthalmic solution Administer 2 drops into the left eye every 4 hours.      aspirin 325 mg tablet Take 1 tablet (325 mg) by mouth once daily as needed for headaches or moderate pain (4 - 6).      calcium carb/vitamin D3/vit K1 (VIACTIV ORAL) Take by mouth once daily.      entecavir (Baraclude) 0.5 mg tablet TAKE ONE (1) TABLET BY MOUTH DAILY 1 OR 2 HOURS BEFORE A MEAL. 90 tablet 3    fluticasone (Flonase) 50 mcg/actuation nasal spray Administer 2 sprays into each nostril once daily.      riTUXimab (Rituxan) 10 mg/mL injection Infuse 100 mL (1,000 mg total) into a venous catheter every 6 months.      Synthroid 100 mcg tablet TAKE 1 TABLET FOR 6 DAYS a week and 1 day off 90 tablet 3    valsartan (Diovan) 40 mg tablet Take 0.5 tablets (20 mg) by mouth once daily. 45 tablet 3    buPROPion (Wellbutrin) 75 mg tablet Take 1 tablet (75 mg) by mouth early in the morning.. 30 tablet 11    cholecalciferol (Vitamin D-3) 50,000 unit capsule TAKE 1 CAPSULE BY MOUTH once a week (Patient not taking: Reported on 1/16/2025) 12 capsule 3    ketoconazole (NIZOral) 2 % shampoo Apply topically. ply 1 Application daily topically. Lather on to scalp, let sit for 2-5 minutes. Rinse thoroughly. Use 2-3 times per week. (Patient not taking: Reported on 1/16/2025)       No current facility-administered medications for this visit.       CC/HPI/ASSESSMENT/PLAN    CC follow-up medications and pneumonia required hospitalization    HPI patient 74-year-old female hospitalized last month with pneumonia.  She was in the hospital for 5 days.  She received IVIG treatment along with IV antibiotics.  She was discharged home on oral antibiotics and notes she is feeling significantly better.  We  discussed getting a repeat follow-up chest x-ray in a couple of weeks to ensure the pneumonia has cleared.  Patient has had pneumonia couple times in the last few years.  Patient with a history of hypertension hypothyroidism, neuromyelitis optica.  We discussed the likelihood that her autoimmune medical issues is contributing to her susceptibility to these infections.  Blood pressure is under good control she request refills for valsartan.  She is also taking Synthroid, patient would like to return to daily dosing of Synthroid.  She notes her finger nails are becoming brittle and cracking.  She will need blood work in a few months.  Her blood counts in the hospital are slightly low, we will recheck blood counts as well    Exam calm neck supple lungs CTA CV RRR Ext no edema neuro alert oriented no focal neurologic deficit noted    A/P 1 pneumonia resolving.  No further antibiotics ordered.  Patient will have chest x-ray in 2 to 3 weeks as ordered at [facility].  #2 hypothyroidism chronic.  Patient will take Synthroid daily and have repeat blood work in 3 months.  Follow-up 3 months.  #3 hypertension chronic stable medicine refilled #4 neuromyelitis optica.  Patient will follow-up with her specialist for Rituxan treatments.  Follow-up 3 months    There are no diagnoses linked to this encounter.

## 2025-01-27 ENCOUNTER — SPECIALTY PHARMACY (OUTPATIENT)
Dept: PHARMACY | Facility: CLINIC | Age: 75
End: 2025-01-27

## 2025-01-27 PROCEDURE — RXMED WILLOW AMBULATORY MEDICATION CHARGE

## 2025-01-28 ENCOUNTER — PHARMACY VISIT (OUTPATIENT)
Dept: PHARMACY | Facility: CLINIC | Age: 75
End: 2025-01-28
Payer: COMMERCIAL

## 2025-02-20 DIAGNOSIS — G36.0 NEUROMYELITIS OPTICA (MULTI): Primary | ICD-10-CM

## 2025-02-20 RX ORDER — DIPHENHYDRAMINE HYDROCHLORIDE 50 MG/ML
25 INJECTION INTRAMUSCULAR; INTRAVENOUS ONCE
OUTPATIENT
Start: 2025-04-18 | End: 2025-04-18

## 2025-02-20 RX ORDER — ACETAMINOPHEN 325 MG/1
650 TABLET ORAL ONCE
OUTPATIENT
Start: 2025-04-18 | End: 2025-04-18

## 2025-02-28 ENCOUNTER — SPECIALTY PHARMACY (OUTPATIENT)
Dept: PHARMACY | Facility: CLINIC | Age: 75
End: 2025-02-28

## 2025-02-28 PROCEDURE — RXMED WILLOW AMBULATORY MEDICATION CHARGE

## 2025-03-03 ENCOUNTER — PHARMACY VISIT (OUTPATIENT)
Dept: PHARMACY | Facility: CLINIC | Age: 75
End: 2025-03-03
Payer: COMMERCIAL

## 2025-03-07 ENCOUNTER — TELEPHONE (OUTPATIENT)
Dept: GASTROENTEROLOGY | Facility: HOSPITAL | Age: 75
End: 2025-03-07
Payer: MEDICARE

## 2025-03-07 ENCOUNTER — TELEPHONE (OUTPATIENT)
Dept: PRIMARY CARE | Facility: CLINIC | Age: 75
End: 2025-03-07
Payer: MEDICARE

## 2025-03-07 DIAGNOSIS — R76.8 HEPATITIS B CORE ANTIBODY POSITIVE: Primary | ICD-10-CM

## 2025-03-07 NOTE — TELEPHONE ENCOUNTER
Patient called asking if labs can be ordered for her upcoming appointment on 5/14/25.     She is hoping they can be ordered within the next week so she can get them accomplished at the same time as other labs that need to be done.

## 2025-03-10 DIAGNOSIS — D84.821 IMMUNODEFICIENCY DUE TO DRUG THERAPY (MULTI): Primary | ICD-10-CM

## 2025-03-10 DIAGNOSIS — G36.0 NEUROMYELITIS OPTICA (MULTI): ICD-10-CM

## 2025-03-10 DIAGNOSIS — Z79.899 IMMUNODEFICIENCY DUE TO DRUG THERAPY (MULTI): Primary | ICD-10-CM

## 2025-03-12 NOTE — TELEPHONE ENCOUNTER
"Hepatology Nurse Coordinator Note   Spoke with patient. She is aware Dr. Kim ordered her blood work as requested. She also asked during the call if Dr. Kim will be doing blood work every 6 months or annually. She's aware this can be addressed in her upcoming visit. She also states that her insurance will only pay for her Entecavir every 30 days now instead of 90 days. She states it is \"now a tier 4 drug.\" She's aware if any issues filling her prescription, to contact the office. Patient verbalized understanding.   "

## 2025-03-17 PROCEDURE — 88185 FLOWCYTOMETRY/TC ADD-ON: CPT

## 2025-03-17 PROCEDURE — 88187 FLOWCYTOMETRY/READ 2-8: CPT

## 2025-03-17 PROCEDURE — 85025 COMPLETE CBC W/AUTO DIFF WBC: CPT

## 2025-03-17 PROCEDURE — 88184 FLOWCYTOMETRY/ TC 1 MARKER: CPT

## 2025-03-17 PROCEDURE — 82784 ASSAY IGA/IGD/IGG/IGM EACH: CPT

## 2025-03-18 ENCOUNTER — LAB (OUTPATIENT)
Dept: LAB | Facility: HOSPITAL | Age: 75
End: 2025-03-18
Payer: MEDICARE

## 2025-03-18 LAB
25(OH)D3+25(OH)D2 SERPL-MCNC: 79 NG/ML (ref 30–100)
ALBUMIN SERPL-MCNC: 4.6 G/DL (ref 3.6–5.1)
ALP SERPL-CCNC: 66 U/L (ref 37–153)
ALT SERPL-CCNC: 10 U/L (ref 6–29)
ANION GAP SERPL CALCULATED.4IONS-SCNC: 8 MMOL/L (CALC) (ref 7–17)
AST SERPL-CCNC: 13 U/L (ref 10–35)
BASOPHILS # BLD AUTO: 0.04 X10*3/UL (ref 0–0.1)
BASOPHILS # BLD AUTO: 40 CELLS/UL (ref 0–200)
BASOPHILS NFR BLD AUTO: 0.8 %
BASOPHILS NFR BLD AUTO: 0.9 %
BILIRUB SERPL-MCNC: 0.4 MG/DL (ref 0.2–1.2)
BUN SERPL-MCNC: 21 MG/DL (ref 7–25)
CALCIUM SERPL-MCNC: 9.5 MG/DL (ref 8.6–10.4)
CHLORIDE SERPL-SCNC: 107 MMOL/L (ref 98–110)
CO2 SERPL-SCNC: 26 MMOL/L (ref 20–32)
CREAT SERPL-MCNC: 0.52 MG/DL (ref 0.6–1)
EGFRCR SERPLBLD CKD-EPI 2021: 99 ML/MIN/1.73M2
EOSINOPHIL # BLD AUTO: 0.11 X10*3/UL (ref 0–0.4)
EOSINOPHIL # BLD AUTO: 110 CELLS/UL (ref 15–500)
EOSINOPHIL NFR BLD AUTO: 2.3 %
EOSINOPHIL NFR BLD AUTO: 2.5 %
ERYTHROCYTE [DISTWIDTH] IN BLOOD BY AUTOMATED COUNT: 11.8 % (ref 11–15)
ERYTHROCYTE [DISTWIDTH] IN BLOOD BY AUTOMATED COUNT: 12.4 % (ref 11.5–14.5)
EST. AVERAGE GLUCOSE BLD GHB EST-MCNC: 108 MG/DL
EST. AVERAGE GLUCOSE BLD GHB EST-SCNC: 6 MMOL/L
GLUCOSE SERPL-MCNC: 101 MG/DL (ref 65–139)
HBA1C MFR BLD: 5.4 % OF TOTAL HGB
HCT VFR BLD AUTO: 44.3 % (ref 35–45)
HCT VFR BLD AUTO: 44.5 % (ref 36–46)
HGB BLD-MCNC: 14.4 G/DL (ref 12–16)
HGB BLD-MCNC: 14.6 G/DL (ref 11.7–15.5)
IGA SERPL-MCNC: 53 MG/DL (ref 70–400)
IGG SERPL-MCNC: 682 MG/DL (ref 700–1600)
IGM SERPL-MCNC: 7 MG/DL (ref 40–230)
IMM GRANULOCYTES # BLD AUTO: 0.01 X10*3/UL (ref 0–0.5)
IMM GRANULOCYTES NFR BLD AUTO: 0.2 % (ref 0–0.9)
LYMPHOCYTES # BLD AUTO: 1 X10*3/UL (ref 0.8–3)
LYMPHOCYTES # BLD AUTO: 920 CELLS/UL (ref 850–3900)
LYMPHOCYTES NFR BLD AUTO: 20.9 %
LYMPHOCYTES NFR BLD AUTO: 21.2 %
MCH RBC QN AUTO: 31.1 PG (ref 27–33)
MCH RBC QN AUTO: 31.6 PG (ref 26–34)
MCHC RBC AUTO-ENTMCNC: 32.4 G/DL (ref 32–36)
MCHC RBC AUTO-ENTMCNC: 33 G/DL (ref 32–36)
MCV RBC AUTO: 94.3 FL (ref 80–100)
MCV RBC AUTO: 98 FL (ref 80–100)
MONOCYTES # BLD AUTO: 0.49 X10*3/UL (ref 0.05–0.8)
MONOCYTES # BLD AUTO: 436 CELLS/UL (ref 200–950)
MONOCYTES NFR BLD AUTO: 10.4 %
MONOCYTES NFR BLD AUTO: 9.9 %
NEUTROPHILS # BLD AUTO: 2895 CELLS/UL (ref 1500–7800)
NEUTROPHILS # BLD AUTO: 3.07 X10*3/UL (ref 1.6–5.5)
NEUTROPHILS NFR BLD AUTO: 65.1 %
NEUTROPHILS NFR BLD AUTO: 65.8 %
NRBC BLD-RTO: 0 /100 WBCS (ref 0–0)
PLATELET # BLD AUTO: 268 X10*3/UL (ref 150–450)
PLATELET # BLD AUTO: 272 THOUSAND/UL (ref 140–400)
PMV BLD REES-ECKER: 10.2 FL (ref 7.5–12.5)
POTASSIUM SERPL-SCNC: 4.3 MMOL/L (ref 3.5–5.3)
PROT SERPL-MCNC: 6.5 G/DL (ref 6.1–8.1)
RBC # BLD AUTO: 4.56 X10*6/UL (ref 4–5.2)
RBC # BLD AUTO: 4.7 MILLION/UL (ref 3.8–5.1)
SODIUM SERPL-SCNC: 141 MMOL/L (ref 135–146)
T4 FREE SERPL-MCNC: 1.4 NG/DL (ref 0.8–1.8)
TSH SERPL-ACNC: 0.84 MIU/L (ref 0.4–4.5)
VIT B12 SERPL-MCNC: 304 PG/ML (ref 200–1100)
WBC # BLD AUTO: 4.4 THOUSAND/UL (ref 3.8–10.8)
WBC # BLD AUTO: 4.7 X10*3/UL (ref 4.4–11.3)

## 2025-03-19 LAB
CD19 CELLS # BLD: <0.01 X10E9/L
CD19 CELLS NFR BLD: <1 %
FLOW CYTOMETRY SPECIALIST REVIEW: ABNORMAL
LYMPHOCYTES # SPEC AUTO: 1 X10*3/UL
PATH REVIEW, B CELL PHENOTYPING, EXTENDED: ABNORMAL

## 2025-03-20 ENCOUNTER — OFFICE VISIT (OUTPATIENT)
Dept: NEUROLOGY | Facility: HOSPITAL | Age: 75
End: 2025-03-20
Payer: MEDICARE

## 2025-03-20 VITALS
HEIGHT: 63 IN | RESPIRATION RATE: 18 BRPM | BODY MASS INDEX: 30.12 KG/M2 | DIASTOLIC BLOOD PRESSURE: 77 MMHG | HEART RATE: 74 BPM | TEMPERATURE: 97.5 F | WEIGHT: 170 LBS | SYSTOLIC BLOOD PRESSURE: 136 MMHG

## 2025-03-20 DIAGNOSIS — G36.0 NEUROMYELITIS OPTICA (MULTI): Primary | ICD-10-CM

## 2025-03-20 PROCEDURE — 3008F BODY MASS INDEX DOCD: CPT | Performed by: PSYCHIATRY & NEUROLOGY

## 2025-03-20 PROCEDURE — 1159F MED LIST DOCD IN RCRD: CPT | Performed by: PSYCHIATRY & NEUROLOGY

## 2025-03-20 PROCEDURE — 3078F DIAST BP <80 MM HG: CPT | Performed by: PSYCHIATRY & NEUROLOGY

## 2025-03-20 PROCEDURE — 3075F SYST BP GE 130 - 139MM HG: CPT | Performed by: PSYCHIATRY & NEUROLOGY

## 2025-03-20 PROCEDURE — 1157F ADVNC CARE PLAN IN RCRD: CPT | Performed by: PSYCHIATRY & NEUROLOGY

## 2025-03-20 PROCEDURE — 1126F AMNT PAIN NOTED NONE PRSNT: CPT | Performed by: PSYCHIATRY & NEUROLOGY

## 2025-03-20 PROCEDURE — 1036F TOBACCO NON-USER: CPT | Performed by: PSYCHIATRY & NEUROLOGY

## 2025-03-20 PROCEDURE — 99214 OFFICE O/P EST MOD 30 MIN: CPT | Performed by: PSYCHIATRY & NEUROLOGY

## 2025-03-20 PROCEDURE — 1123F ACP DISCUSS/DSCN MKR DOCD: CPT | Performed by: PSYCHIATRY & NEUROLOGY

## 2025-03-20 RX ORDER — ERGOCALCIFEROL 1.25 MG/1
1.25 CAPSULE ORAL
Qty: 12 CAPSULE | Refills: 3 | Status: SHIPPED | OUTPATIENT
Start: 2025-03-23 | End: 2026-03-23

## 2025-03-20 ASSESSMENT — PAIN SCALES - GENERAL: PAINLEVEL_OUTOF10: 0-NO PAIN

## 2025-03-20 NOTE — PATIENT INSTRUCTIONS
Your last blood work was good. Your IGG level improved to some degree.     We can extend your dose by one month to see if it is safe to extend dosing to reduce the risk of infection.     I will also have Milton talk to you about actemra as a potential alternative to rituximab.     Please restart vitamin D 59223 units weekly.     Please start wellbutrin for fatigue and low mood when ready.     Follow up after six months.     Thank you for visiting the clinic today    Reji Ryan MD

## 2025-03-21 ENCOUNTER — APPOINTMENT (OUTPATIENT)
Dept: OPHTHALMOLOGY | Facility: CLINIC | Age: 75
End: 2025-03-21
Payer: MEDICARE

## 2025-03-21 LAB
ALBUMIN SERPL-MCNC: 4.4 G/DL (ref 3.6–5.1)
ALBUMIN/GLOB SERPL: 2.2 (CALC) (ref 1–2.5)
ALP SERPL-CCNC: 63 U/L (ref 37–153)
ALT SERPL-CCNC: 10 U/L (ref 6–29)
AST SERPL-CCNC: 13 U/L (ref 10–35)
BILIRUB DIRECT SERPL-MCNC: 0.1 MG/DL
BILIRUB INDIRECT SERPL-MCNC: 0.3 MG/DL (CALC) (ref 0.2–1.2)
BILIRUB SERPL-MCNC: 0.4 MG/DL (ref 0.2–1.2)
GLOBULIN SER CALC-MCNC: 2 G/DL (CALC) (ref 1.9–3.7)
HBV DNA SERPL NAA+PROBE-ACNC: NOT DETECTED IU/ML
HBV DNA SERPL NAA+PROBE-LOG IU: NOT DETECTED LOG IU/ML
HBV SURFACE AG SERPL QL IA: NORMAL
PROT SERPL-MCNC: 6.4 G/DL (ref 6.1–8.1)

## 2025-03-24 NOTE — PROGRESS NOTES
"Assessment and Plan    08/17/2021 Rashad-Hallpike negative bilateral.    11/10/2014 height 5`3\" & weight 228 pounds for BMI 40.    03/21/2018 MRI brain & orbits with contrast, which I personally reviewed previously, shows stable non-specific subcortical white matter changes on FLAIR.  10/12/2017 MRI cervical & thoracic spine, by report, shows “There is abnormal increased intramedullary signal on the T2 weighted images noted within the cervical cord centered posteriorly along the midline at the C5 level. There is the suggestion of mild cervical cord volume loss at this level. There is no corresponding abnormal enhancement on the postcontrast images. Considerations could include a focal area of myelomalacia or conceivably sequelae of a demyelinating process.”  10/10/2017 MRI brain & orbits with contrast, which I personally reviewed previously, shows right > left enhancement of the optic nerves near the orbital apices along with increased STIR signal. There is a left lentiform nucleus FLAIR hyperintense lesion likely lacunar infarction along with periventricular white matter likely consistent with ischemia.    10/27/2017 lumbar puncture tube 1: RBC 76795, WBC 51 (78% N, 17% L), tube 4: RBC 9000, WBC 14 (91%, N, 9% L, 6% M), protein 162, glucose 82. Elevated IgG synthesis, but complicated by elevated CSF albumin. Oligoclonal bands negative.    Lab Results   Component Value Date/Time    NMOAQP4 Negative 10/25/2017 1653     Lab Results   Component Value Date/Time    SEDRATE 13 10/01/2024 1247    SEDRATE 18 12/04/2019 1310    SEDRATE 19 11/14/2018 1350    SEDRATE 12 10/10/2017 1214    CRP 0.49 10/01/2024 1247    CRP 0.25 12/04/2019 1310    CRP 0.49 11/14/2018 1350    CRP 0.31 10/10/2017 1214     Lab Results   Component Value Date/Time    SVFDZUQY12 304 03/17/2025 1435    IGSRKDGP37 281 09/19/2024 1303    FHWYYGWD07 352 08/15/2018 1732    KCMKWPWE83 455 10/11/2017 0915    METHYLACID 64 10/11/2017 0915    COPPER 139 10/11/2017 " 0915     Lab Results   Component Value Date/Time    LANTIS 0.11 10/11/2017 0915     Tuberculosis studies  Lab Results   Component Value Date/Time    QFG NEGATIVE 10/11/2017 0915     Thyroid eye disease studies  Lab Results   Component Value Date/Time    RECE <1.10 10/01/2024 1247    TSI <1.0 10/01/2024 1247     11/23/2020 autoimmune encephalopathy panel negative.  11/14/2018 NMO/AQP4 & MOG antibodies negative.  11/22/2017 JCV positive.  10/25/2017 NMO antibody negative. MOG IgG negative. Anti-MAG negative. Anti-SGPG negative.  10/10/2017 yphilis IgG NR.    03/25/2025 OCT RNFL OD 66. (Stable)  10/01/2024 OCT RNFL OD 64. (Stable)  04/02/2024 OCT RNFL OD 65. (stable)  09/19/2023 OCT RNFL OD 66. (stable)  03/24/2023 OCT RNFL OD 66. (stable)  09/23/2022 OCT RNFL OD 65. (stable)  03/18/2022 OCT RNFL OD 64. (stalbe)  08/17/2021 OCT RNFL OD 66. (stable)  03/26/2021 OCT RNFL OD 66. (stable)  06/19/2020 OCT RNFL OD 66. (stable)  12/30/2019 OCT RNFL OD 65. (stable)  06/21/2019 OCT RNFL OD 68. (stable)  12/21/2018 OCT RNFL OD 68.  07/17/2018 OCT RNFL OD 70 with T & borderline N thinning.  04/20/2018 OCT RNFL OD 68 with T & borderline N & I thinning.  02/23/2018 OCT RNFL OD 63 with falsely low algorithm failure nasally.  12/07/2017 OCT RNFL OD 83.  11/10/2017 OCT RNFL OD 89.  10/24/2017 OCT RNFL OD 84.  10/09/2017 OCT RNFL OD 85.  OCT macula OD normal contour 262.  10/03/2017 OCT RNFL OD 85.  OCT macula OD normal contour 270.    03/25/2025 HVF 24-2 OD fovea 31, FL 15/15, FP 2%, FN 0%, rim scatter MD -1.91 & OS stimulus V, fovea 9, generalized depression.  10/01/2024 HVF 24-2 OD fovea 32, wnl MD +0.19.  04/02/2024 HVF 24-2 OD fovea 32, scatter MD -0.39.  09/19/2023 HVF 24-2 OD fovea 32, wnl MD -1.13.  03/24/2023 HVF 24-2 OD fovea 30, scatter MD -1.02.  09/23/2022 HVF 24-2 OD fovea 31, wnl MD -0.49.  03/18/2022 HVF 24-2 OD fovea 32, scatter MD -0.10.  08/17/2021 HVF 24-2 OD fovea 31, possible enlarged blind spot versus scatter MD  -0.89.  03/26/2021 HVF 24-2 OD fovea 31, wnl MD +0.91.  06/19/2020 HVF 24-2 OD fovea 32, wnl MD +0.64.  12/30/2019 HVF 24-2 OD fovea 34, wnl MD +0.14.  06/21/2019 HVF 24-2 OD fovea 32, wnl MD +0.23.  12/21/2018 HVF 24-2 OD fovea 32, wnl MD +0.41.  07/17/2018 HVF 24-2 OD fovea 35, wnl MD -0.23.  04/20/2018 HVF 24-2 OD fovea 31, wnl MD -1.01.  02/23/2018 HVF 24-2 OD fovea 29, wnl MD -1.81.  12/07/2017 HVF 24-2 OD wnl, fovea 27 dB MD -0.99.  11/10/2017 HVF 24-2 OD FP 0%, FN 1%, central scotoma, fovea 28, MD 2.83.  11/02/2017 HVF 24-2 OD FP 5%, FN 0%, central scotoma, fovea 13, MD -6.18.  10/24/2017 HVF 24-2 FP 3%, FN 8%, cecocentral scotoma.  10/09/2017 HVF 30-2 FP 11%, FN N/A, generalized depression with relatively preserved central island MD -26.58.    This 74 year-old woman, Jehovah`s witness, with a history of neuromyelitis optica spectrum disease, congenital cataract OS with blindness OS, transverse myelitis 3/2006, RA, hepatitis B & C, HTN, Graves / hypothyroidism, obesity, former smoker presents in follow up for right optic neuritis.    Today's examination remains stable including Lee visual field (HVF) and OCT retinal nerve fiber layer testing. These findings are consistent with sustained control of neuromyelitis optica spectrum disease. I do recommend continued immunosuppression although think changing from ritixumab is reasonable given recent infections.    Her headaches are suspicious for sleep apnea. I also considered giant cell arteritis, but ESR & CRP were low. I recommend sleep evaluation when stable enough.    Her vision episodes still are consistent with late life migraine accompaniment. I again do not think they are stroke or seizure given positive symptoms.    Thyroid eye disease remains stable. Antibody studies were low. I think thyroid eye disease is in quiescent state.    Plan    Continue rituximab with dosing per Dr. Reji Ryan with change of agent.  Refractive care with  optometry.  Pursue sleep study.    Follow up in 6-9 months with Lee visual field (HVF) OD & OCT OD. (dilation 4/2/2024)

## 2025-03-25 ENCOUNTER — APPOINTMENT (OUTPATIENT)
Dept: OPHTHALMOLOGY | Facility: CLINIC | Age: 75
End: 2025-03-25
Payer: MEDICARE

## 2025-03-25 DIAGNOSIS — G36.0 NEUROMYELITIS OPTICA (MULTI): Primary | ICD-10-CM

## 2025-03-25 DIAGNOSIS — E05.00 GRAVES DISEASE: ICD-10-CM

## 2025-03-25 PROCEDURE — 92083 EXTENDED VISUAL FIELD XM: CPT | Performed by: PSYCHIATRY & NEUROLOGY

## 2025-03-25 PROCEDURE — 99214 OFFICE O/P EST MOD 30 MIN: CPT | Performed by: PSYCHIATRY & NEUROLOGY

## 2025-03-25 PROCEDURE — 92133 CPTRZD OPH DX IMG PST SGM ON: CPT | Performed by: PSYCHIATRY & NEUROLOGY

## 2025-03-25 RX ORDER — FLUCONAZOLE 150 MG/1
1 TABLET ORAL
COMMUNITY
Start: 2025-01-16

## 2025-03-25 RX ORDER — LEVOFLOXACIN 500 MG/1
1 TABLET, FILM COATED ORAL
COMMUNITY
Start: 2024-12-19

## 2025-03-25 RX ORDER — ONDANSETRON 4 MG/1
TABLET, FILM COATED ORAL
COMMUNITY
Start: 2024-12-19

## 2025-03-25 RX ORDER — ASPIRIN 325 MG
50000 TABLET, DELAYED RELEASE (ENTERIC COATED) ORAL WEEKLY
Qty: 4 CAPSULE | Refills: 11 | Status: SHIPPED | OUTPATIENT
Start: 2025-03-25 | End: 2026-03-25

## 2025-03-25 ASSESSMENT — CONF VISUAL FIELD
OS_INFERIOR_NASAL_RESTRICTION: 1
OD_NORMAL: 1
OS_SUPERIOR_TEMPORAL_RESTRICTION: 1
OD_INFERIOR_NASAL_RESTRICTION: 0
OD_SUPERIOR_TEMPORAL_RESTRICTION: 0
OS_SUPERIOR_NASAL_RESTRICTION: 1
OD_INFERIOR_TEMPORAL_RESTRICTION: 0
OD_SUPERIOR_NASAL_RESTRICTION: 0
OS_INFERIOR_TEMPORAL_RESTRICTION: 1

## 2025-03-25 ASSESSMENT — CUP TO DISC RATIO: OD_RATIO: 0.4

## 2025-03-25 ASSESSMENT — ENCOUNTER SYMPTOMS
PSYCHIATRIC NEGATIVE: 0
CONSTITUTIONAL NEGATIVE: 0
CARDIOVASCULAR NEGATIVE: 0
NEUROLOGICAL NEGATIVE: 1
GASTROINTESTINAL NEGATIVE: 0
MUSCULOSKELETAL NEGATIVE: 0
ALLERGIC/IMMUNOLOGIC NEGATIVE: 0
RESPIRATORY NEGATIVE: 0
ENDOCRINE NEGATIVE: 0
EYES NEGATIVE: 1
HEMATOLOGIC/LYMPHATIC NEGATIVE: 0

## 2025-03-25 ASSESSMENT — REFRACTION_WEARINGRX
OD_CYLINDER: -5.25
OS_AXIS: 001
OD_AXIS: 074
OD_SPHERE: +0.25
OS_CYLINDER: -5.25
SPECS_TYPE: PAL
OD_ADD: +2.50
OS_SPHERE: +0.25
OS_ADD: +2.50

## 2025-03-25 ASSESSMENT — EXTERNAL EXAM - RIGHT EYE: OD_EXAM: NORMAL

## 2025-03-25 ASSESSMENT — SLIT LAMP EXAM - LIDS
COMMENTS: NORMAL
COMMENTS: NORMAL

## 2025-03-25 ASSESSMENT — VISUAL ACUITY
METHOD: SNELLEN - LINEAR
CORRECTION_TYPE: GLASSES
OS_CC: HM
OD_PH_CC: 20/30
OD_CC: 20/40

## 2025-03-25 ASSESSMENT — PACHYMETRY
OD_CT(UM): 589
OS_CT(UM): 580

## 2025-03-25 ASSESSMENT — EXTERNAL EXAM - LEFT EYE: OS_EXAM: NORMAL

## 2025-03-25 ASSESSMENT — TONOMETRY
OD_IOP_MMHG: 13
IOP_METHOD: GOLDMANN APPLANATION
OS_IOP_MMHG: 14

## 2025-03-26 ENCOUNTER — SPECIALTY PHARMACY (OUTPATIENT)
Dept: PHARMACY | Facility: CLINIC | Age: 75
End: 2025-03-26

## 2025-03-26 PROCEDURE — RXMED WILLOW AMBULATORY MEDICATION CHARGE

## 2025-03-27 ENCOUNTER — PHARMACY VISIT (OUTPATIENT)
Dept: PHARMACY | Facility: CLINIC | Age: 75
End: 2025-03-27
Payer: COMMERCIAL

## 2025-04-10 ENCOUNTER — APPOINTMENT (OUTPATIENT)
Dept: PRIMARY CARE | Facility: CLINIC | Age: 75
End: 2025-04-10
Payer: MEDICARE

## 2025-04-10 VITALS
DIASTOLIC BLOOD PRESSURE: 76 MMHG | HEART RATE: 76 BPM | WEIGHT: 174 LBS | BODY MASS INDEX: 29.71 KG/M2 | HEIGHT: 64 IN | TEMPERATURE: 97.5 F | SYSTOLIC BLOOD PRESSURE: 112 MMHG

## 2025-04-10 DIAGNOSIS — M25.559 HIP PAIN, UNSPECIFIED LATERALITY: ICD-10-CM

## 2025-04-10 DIAGNOSIS — G36.0 NEUROMYELITIS OPTICA (MULTI): ICD-10-CM

## 2025-04-10 DIAGNOSIS — E03.9 HYPOTHYROIDISM, UNSPECIFIED TYPE: ICD-10-CM

## 2025-04-10 DIAGNOSIS — I10 BENIGN ESSENTIAL HYPERTENSION: Primary | ICD-10-CM

## 2025-04-10 DIAGNOSIS — M54.50 LOW BACK PAIN, UNSPECIFIED BACK PAIN LATERALITY, UNSPECIFIED CHRONICITY, UNSPECIFIED WHETHER SCIATICA PRESENT: ICD-10-CM

## 2025-04-10 PROCEDURE — 3078F DIAST BP <80 MM HG: CPT | Performed by: FAMILY MEDICINE

## 2025-04-10 PROCEDURE — 1123F ACP DISCUSS/DSCN MKR DOCD: CPT | Performed by: FAMILY MEDICINE

## 2025-04-10 PROCEDURE — 1036F TOBACCO NON-USER: CPT | Performed by: FAMILY MEDICINE

## 2025-04-10 PROCEDURE — 3074F SYST BP LT 130 MM HG: CPT | Performed by: FAMILY MEDICINE

## 2025-04-10 PROCEDURE — 1126F AMNT PAIN NOTED NONE PRSNT: CPT | Performed by: FAMILY MEDICINE

## 2025-04-10 PROCEDURE — 1159F MED LIST DOCD IN RCRD: CPT | Performed by: FAMILY MEDICINE

## 2025-04-10 PROCEDURE — 3008F BODY MASS INDEX DOCD: CPT | Performed by: FAMILY MEDICINE

## 2025-04-10 PROCEDURE — 1157F ADVNC CARE PLAN IN RCRD: CPT | Performed by: FAMILY MEDICINE

## 2025-04-10 PROCEDURE — 99214 OFFICE O/P EST MOD 30 MIN: CPT | Performed by: FAMILY MEDICINE

## 2025-04-10 ASSESSMENT — ENCOUNTER SYMPTOMS
DEPRESSION: 0
LOSS OF SENSATION IN FEET: 0
OCCASIONAL FEELINGS OF UNSTEADINESS: 0

## 2025-04-10 ASSESSMENT — PATIENT HEALTH QUESTIONNAIRE - PHQ9
2. FEELING DOWN, DEPRESSED OR HOPELESS: NOT AT ALL
1. LITTLE INTEREST OR PLEASURE IN DOING THINGS: NOT AT ALL
SUM OF ALL RESPONSES TO PHQ9 QUESTIONS 1 AND 2: 0

## 2025-04-10 ASSESSMENT — PAIN SCALES - GENERAL: PAINLEVEL_OUTOF10: 0-NO PAIN

## 2025-04-10 NOTE — PROGRESS NOTES
"    /76 (BP Location: Right arm, Patient Position: Sitting)   Pulse 76   Temp 36.4 °C (97.5 °F)   Ht 1.626 m (5' 4\")   Wt 78.9 kg (174 lb)   BMI 29.87 kg/m²     Past Medical History:   Diagnosis Date    Acute atopic conjunctivitis, bilateral 05/09/2018    Allergic conjunctivitis of both eyes    Acute transverse myelitis in demyelinating disease of central nervous system 11/20/2017    Transverse myelitis    Age-related nuclear cataract, right eye 12/20/2022    Age-related nuclear cataract of right eye    Breast cancer 2021    Left    Chronic viral hepatitis C (Multi) 02/23/2018    Chronic hepatitis C without hepatic coma    Chronic viral hepatitis C (Multi) 04/20/2018    Chronic hepatitis C without hepatic coma    Chronic viral hepatitis C (Multi) 05/25/2018    Chronic hepatitis C without hepatic coma    Chronic viral hepatitis C (Multi) 07/17/2018    Chronic hepatitis C without hepatic coma    Congenital cataract     Congenital cataract    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    Neuromyelitis optica    Ocular pain, unspecified eye 03/10/2014    Eye pain    Other abnormal and inconclusive findings on diagnostic imaging of breast 11/03/2020    Abnormal finding on breast imaging    Other anomalies of pupillary function 06/21/2019    Relative afferent pupillary defect of right eye    Other microscopic hematuria     Microscopic hematuria    Other specified abnormal findings of blood chemistry     Abnormal liver function test    Other specified abnormal findings of blood chemistry 07/17/2018    Abnormal liver function test    Other specified abnormal immunological findings in serum 02/23/2018    Hepatitis B core antibody positive    Other specified abnormal immunological findings in serum 04/20/2018    Hepatitis B core antibody positive    Other specified abnormal immunological findings in serum 05/25/2018    Hepatitis B core antibody positive    Other specified abnormal immunological findings in serum " 07/17/2018    Hepatitis B core antibody positive    Other subjective visual disturbances 12/20/2022    Photopsia of right eye    Personal history of irradiation 2021    Left breast    Personal history of other diseases of the circulatory system     History of hypertension    Personal history of other diseases of the nervous system and sense organs 05/09/2018    History of acute conjunctivitis    Personal history of other diseases of the nervous system and sense organs 12/20/2022    History of acute conjunctivitis    Personal history of other diseases of the nervous system and sense organs 02/22/2013    History of cataract    Personal history of other endocrine, nutritional and metabolic disease     History of Graves' disease    Personal history of other infectious and parasitic diseases 11/13/2018    History of hepatitis C virus infection    Personal history of other specified conditions     History of abdominal pain    Personal history of other specified conditions 12/04/2019    History of headache    Pupillary abnormality, left eye 10/03/2017    Pupillary abnormality of left eye    Rheumatoid arthritis, unspecified     Rheumatoid arthritis    Spondylosis without myelopathy or radiculopathy, cervical region     Cervical spondylosis    Spondylosis without myelopathy or radiculopathy, cervical region 07/03/2014    Cervical spondylosis    Unspecified abdominal pain 04/20/2018    Abdominal pain    Unspecified abdominal pain 05/25/2018    Abdominal pain    Unspecified abdominal pain 07/17/2018    Abdominal pain    Unspecified optic neuritis 12/20/2022    Optic neuritis, right    Vitamin D deficiency, unspecified 07/17/2018    Vitamin D insufficiency       Patient Active Problem List   Diagnosis    Age-related nuclear cataract of right eye    Benign essential hypertension    Bilateral dry eyes    Bilateral keratitis    Blepharitis of both upper and lower eyelid of left eye    Central scotoma, right eye    Cervical  spondylosis    Chronic hepatitis C without hepatic coma (Multi)    Colon polyp    Conjunctival concretions of right eye    Dermatitis of face    Elevated IOP    Exposure to medical therapeutic radiation    Graves disease    Hepatitis B core antibody positive    Hepatitis C virus infection cured after antiviral drug therapy    Hip pain, right    Neuromyelitis optica (Multi)    On rituximab therapy    Pain, upper back    Photopsia of right eye    PVD (posterior vitreous detachment), right eye    Vitamin D deficiency    Hypothyroidism, unspecified    Steroid responder, bilateral    Status post bilateral breast reduction    History of adenomatous polyp of colon    Drug-induced immunodeficiency (Multi)    Acute disseminated demyelination, unspecified    Telogen effluvium    Transverse myelitis (Multi)    Discomfort of right ear    Class 1 obesity due to excess calories with serious comorbidity and body mass index (BMI) of 32.0 to 32.9 in adult    Incomplete uterovaginal prolapse    Bowel dysfunction    Cystocele, midline    Muscle weakness    OAB (overactive bladder)    Rectocele    Urge urinary incontinence    Urinary frequency    Urinary urgency    Thyroid nodule    Astigmatism of both eyes with presbyopia    Weakness    Shortness of breath at rest    Rheumatoid arthritis    Relative afferent pupillary defect of right eye    Pneumonia    Otitis media    Nausea    Microscopic hematuria    Malignant neoplasm of central portion of female breast    History of hypertension    History of Graves' disease    Headache    Female genital prolapse    Disease of nail    Disease due to severe acute respiratory syndrome coronavirus 2 (SARS-CoV-2)    Congenital cataract    Chest discomfort    Bronchospasm    Bladder outlet obstruction    Bilateral hydronephrosis    Acute conjunctivitis    Allergic rhinitis       Current Outpatient Medications   Medication Sig Dispense Refill    albuterol 2.5 mg /3 mL (0.083 %) nebulizer solution Take  3 mL (2.5 mg) by nebulization 4 times a day as needed for wheezing or shortness of breath.      albuterol 90 mcg/actuation inhaler Inhale 2 puffs every 6 hours if needed for shortness of breath. 18 g 3    artificial tears, dextran-hypomel-glycerin, 0.1-0.3-0.2 % ophthalmic solution Administer 2 drops into the left eye every 4 hours.      aspirin 325 mg tablet Take 1 tablet (325 mg) by mouth once daily as needed for headaches or moderate pain (4 - 6).      calcium carb/vitamin D3/vit K1 (VIACTIV ORAL) Take by mouth once daily.      cholecalciferol (Vitamin D-3) 1.25 mg (50,000 units) capsule Take 1 capsule (50,000 Units) by mouth 1 (one) time per week. 4 capsule 11    entecavir (Baraclude) 0.5 mg tablet TAKE ONE (1) TABLET BY MOUTH DAILY 1 OR 2 HOURS BEFORE A MEAL. 90 tablet 3    fluconazole (Diflucan) 150 mg tablet Take 1 tablet (150 mg) by mouth early in the morning..      riTUXimab (Rituxan) 10 mg/mL injection Infuse 100 mL (1,000 mg total) into a venous catheter every 6 months.      Synthroid 100 mcg tablet Take 1 tablet (100 mcg) by mouth early in the morning.. 90 tablet 3    valsartan (Diovan) 40 mg tablet Take 0.5 tablets (20 mg) by mouth once daily. 45 tablet 3    buPROPion (Wellbutrin) 75 mg tablet Take 1 tablet (75 mg) by mouth early in the morning.. 30 tablet 11    fluticasone (Flonase) 50 mcg/actuation nasal spray Administer 2 sprays into each nostril once daily. (Patient not taking: Reported on 4/10/2025)      levoFLOXacin (Levaquin) 500 mg tablet Take 1 tablet (500 mg) by mouth early in the morning.. (Patient not taking: Reported on 4/10/2025)      ondansetron (Zofran) 4 mg tablet TAKE 1 TABLET BY MOUTH EVERY 8 HOURS FOR 5 DAYS AS NEEDED FOR NAUSEA (Patient not taking: Reported on 4/10/2025)       No current facility-administered medications for this visit.       CC/HPI/ASSESSMENT/PLAN    CC follow-up medication    HPI patient with a history of hypertension hypothyroidism, neuromyelitis optica,  transverse myelitis.  Patient sees neurology on a regular basis and is receiving Rituxan infusions for her NMO.  Patient notes she is very concerned that her hair has been thinning dramatically over the last couple months.  Extensive blood work performed recently reviewed.  B12 slightly low recommend adding B12 1000 mcg daily.  Liver kidney sugar cholesterol thyroid levels look good all results were reviewed.  Patient notes she is dealing with chronic low back pain as well as hip pain.  We discussed going to physical therapy.  We spent time reviewing blood work and other testing results from [facility] from her hospitalization a couple months ago.  These will be scanned into the chart.  Patient denies fever chills abdominal pain blood in urine or stool.    Exam calm eyes no jaundice neck supple lungs CTA CV RRR Ext no edema skin no rash    A/P 1.  Hypertension chronic stable.  Blood pressure is very stable.  Patient notes she has not really been taking her valsartan, but she is monitoring her blood pressure.  If her blood pressure begins to elevate again she will start taking valsartan daily.  #2 low back pain #3 hip pain.  Physical therapy ordered.  #4 hypothyroidism chronic stable.  #5 neuromyelitis optica.  Patient will follow-up with neurology as previously arranged for Rituxan infusions.  Follow-up 3 months.  I spent in excess of 30 minutes with patient more than half that time spent in counseling discussion with regards to her medical illnesses and testing results.  Follow-up 3 months    There are no diagnoses linked to this encounter.

## 2025-04-18 ENCOUNTER — APPOINTMENT (OUTPATIENT)
Dept: HEMATOLOGY/ONCOLOGY | Facility: CLINIC | Age: 75
End: 2025-04-18
Payer: MEDICARE

## 2025-04-24 PROCEDURE — RXMED WILLOW AMBULATORY MEDICATION CHARGE

## 2025-04-29 ENCOUNTER — PHARMACY VISIT (OUTPATIENT)
Dept: PHARMACY | Facility: CLINIC | Age: 75
End: 2025-04-29
Payer: COMMERCIAL

## 2025-05-14 ENCOUNTER — OFFICE VISIT (OUTPATIENT)
Dept: GASTROENTEROLOGY | Facility: HOSPITAL | Age: 75
End: 2025-05-14
Payer: MEDICARE

## 2025-05-14 VITALS
TEMPERATURE: 97.5 F | WEIGHT: 176.6 LBS | SYSTOLIC BLOOD PRESSURE: 124 MMHG | HEART RATE: 79 BPM | DIASTOLIC BLOOD PRESSURE: 71 MMHG | BODY MASS INDEX: 30.31 KG/M2 | RESPIRATION RATE: 18 BRPM | OXYGEN SATURATION: 98 %

## 2025-05-14 DIAGNOSIS — R76.8 HEPATITIS B CORE ANTIBODY POSITIVE: Primary | ICD-10-CM

## 2025-05-14 DIAGNOSIS — Z86.19 HEPATITIS C VIRUS INFECTION CURED AFTER ANTIVIRAL DRUG THERAPY: ICD-10-CM

## 2025-05-14 PROCEDURE — 99213 OFFICE O/P EST LOW 20 MIN: CPT | Performed by: INTERNAL MEDICINE

## 2025-05-14 PROCEDURE — 1126F AMNT PAIN NOTED NONE PRSNT: CPT | Performed by: INTERNAL MEDICINE

## 2025-05-14 PROCEDURE — 3078F DIAST BP <80 MM HG: CPT | Performed by: INTERNAL MEDICINE

## 2025-05-14 PROCEDURE — 1159F MED LIST DOCD IN RCRD: CPT | Performed by: INTERNAL MEDICINE

## 2025-05-14 PROCEDURE — G2211 COMPLEX E/M VISIT ADD ON: HCPCS | Performed by: INTERNAL MEDICINE

## 2025-05-14 PROCEDURE — 3074F SYST BP LT 130 MM HG: CPT | Performed by: INTERNAL MEDICINE

## 2025-05-14 ASSESSMENT — PAIN SCALES - GENERAL: PAINLEVEL_OUTOF10: 0-NO PAIN

## 2025-05-14 NOTE — PROGRESS NOTES
Subjective     Follow up to manage risk of HBV reactivation.    History of Present Illness:   Kristina Lundberg is a 74 y.o. female who presents to GI clinic for a follow up appointment.    Last visit: 2022 (7/6/2022).    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 She continues on treatment with Rituximab, dosed every 6 months.     She has a longstanding history of C infection which has been treated and cured many years ago. She is also Hepatitis B Core Ab positive. She is on suppressive antiviral therapy with entecavir to prevent reactivation of hepatitis B while on immunosuppressive therapies, such as Rituximab. Cost has become an issues, she asks about alternative at today's appointment.     She underwent liver elastography with Fibroscan (2018) to stage her liver disease. Her LSM was 5.0 kPa, consistent with very mild fibrosis (F0-F1). Her HCV has now been treated with Epclusa x 12 weeks (completed therapy end of June 2018). She has achieved a sustained response and cure.     She is compliant with entecavir. She continues treatment with Rituximab.    Review of Systems  Review of Systems  Neg GI and liver ROS.  She is having issues with pelvic floor dysfunction.     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), Ocular pain, unspecified eye (03/10/2014), Other abnormal and inconclusive findings on diagnostic imaging of breast (11/03/2020),  Other anomalies of pupillary function (06/21/2019), Other microscopic hematuria, Other specified abnormal findings of blood chemistry, Other specified abnormal findings of blood chemistry (07/17/2018), Other specified abnormal immunological findings in serum (02/23/2018), Other specified abnormal immunological findings in serum (04/20/2018), Other specified abnormal immunological findings in serum (05/25/2018), Other specified abnormal immunological findings in serum (07/17/2018), Other subjective visual disturbances (12/20/2022), Personal history of irradiation (2021), Personal history of other diseases of the circulatory system, Personal history of other diseases of the nervous system and sense organs (05/09/2018), Personal history of other diseases of the nervous system and sense organs (12/20/2022), Personal history of other diseases of the nervous system and sense organs (02/22/2013), Personal history of other endocrine, nutritional and metabolic disease, Personal history of other infectious and parasitic diseases (11/13/2018), Personal history of other specified conditions, Personal history of other specified conditions (12/04/2019), Pupillary abnormality, left eye (10/03/2017), Rheumatoid arthritis, unspecified, Spondylosis without myelopathy or radiculopathy, cervical region, Spondylosis without myelopathy or radiculopathy, cervical region (07/03/2014), Unspecified abdominal pain (04/20/2018), Unspecified abdominal pain (05/25/2018), Unspecified abdominal pain (07/17/2018), Unspecified optic neuritis (12/20/2022), and Vitamin D deficiency, unspecified (07/17/2018).     Social History   reports that she has quit smoking. Her smoking use included cigarettes. She has been exposed to tobacco smoke. She has never used smokeless tobacco. She reports that she does not currently use alcohol. She reports that she does not use drugs.     Family History  family history includes Breast cancer (age of onset: 43) in her  "mother; Diabetes type II in her maternal grandfather; acute myocardial infarction in her father; cardiac arrest in her father; malignant neoplasm in her maternal grandfather.     Allergies  RX Allergies[1]    Medications  Current Outpatient Medications   Medication Instructions    albuterol 90 mcg/actuation inhaler 2 puffs, inhalation, Every 6 hours PRN    albuterol 2.5 mg, nebulization, 4 times daily PRN    artificial tears, dextran-hypomel-glycerin, 0.1-0.3-0.2 % ophthalmic solution 2 drops, Every 4 hours    aspirin 325 mg tablet 1 tablet, Daily PRN    buPROPion (WELLBUTRIN) 75 mg, oral, Daily    calcium carb/vitamin D3/vit K1 (VIACTIV ORAL) Daily    cholecalciferol (VITAMIN D-3) 1.25 mg, oral, Weekly    entecavir (Baraclude) 0.5 mg tablet TAKE ONE (1) TABLET BY MOUTH DAILY 1 OR 2 HOURS BEFORE A MEAL.    fluconazole (Diflucan) 150 mg tablet 1 tablet, Daily (0630)    riTUXimab (RITUXAN) 1,000 mg, Every 6 months    Synthroid 100 mcg, oral, Daily    valsartan (DIOVAN) 20 mg, oral, Daily        Objective   Visit Vitals  /71 (BP Location: Right arm, Patient Position: Sitting, BP Cuff Size: Large adult)   Pulse 79   Temp 36.4 °C (97.5 °F) (Temporal)   Resp 18          11/13/2024    11:37 AM 12/10/2024     3:28 PM 12/10/2024     3:50 PM 1/16/2025    11:44 AM 3/20/2025    11:27 AM 4/10/2025    11:28 AM 5/14/2025     1:02 PM   Vitals   Systolic 100 109  130 136 112 124   Diastolic 60 69  78 77 76 71   BP Location    Right arm  Right arm Right arm   Heart Rate 96 92 95 82 74 76 79   Temp    36.6 °C (97.8 °F) 36.4 °C (97.5 °F) 36.4 °C (97.5 °F) 36.4 °C (97.5 °F)   Resp     18  18   Height 1.613 m (5' 3.5\") 1.613 m (5' 3.5\")  1.6 m (5' 3\") 1.6 m (5' 3\") 1.626 m (5' 4\")    Weight (lb) 179 163  166 170 174 178   BMI 31.21 kg/m2 28.42 kg/m2  29.41 kg/m2 30.11 kg/m2 29.87 kg/m2 30.55 kg/m2   BSA (m2) 1.91 m2 1.82 m2  1.83 m2 1.85 m2 1.89 m2 1.91 m2   Visit Report Report Report Report Report Report Report Report     Physical " Exam  Appears well.  Chest exam is normal.  Normal heart sounds.  Normal auscultation of the lungs.,  Abdomen is flat, non distended and non tender.    Labs    WBC   Date/Time Value Ref Range Status   03/17/2025 02:31 PM 4.7 4.4 - 11.3 x10*3/uL Final     WHITE BLOOD CELL COUNT   Date/Time Value Ref Range Status   03/17/2025 02:35 PM 4.4 3.8 - 10.8 Thousand/uL Final     Hemoglobin   Date/Time Value Ref Range Status   03/17/2025 02:31 PM 14.4 12.0 - 16.0 g/dL Final     HEMOGLOBIN   Date/Time Value Ref Range Status   03/17/2025 02:35 PM 14.6 11.7 - 15.5 g/dL Final     Hematocrit   Date/Time Value Ref Range Status   03/17/2025 02:31 PM 44.5 36.0 - 46.0 % Final     HEMATOCRIT   Date/Time Value Ref Range Status   03/17/2025 02:35 PM 44.3 35.0 - 45.0 % Final     MCV   Date/Time Value Ref Range Status   03/17/2025 02:35 PM 94.3 80.0 - 100.0 fL Final   03/17/2025 02:31 PM 98 80 - 100 fL Final     Platelets   Date/Time Value Ref Range Status   03/17/2025 02:31  150 - 450 x10*3/uL Final     PLATELET COUNT   Date/Time Value Ref Range Status   03/17/2025 02:35  140 - 400 Thousand/uL Final        PROTEIN, TOTAL   Date/Time Value Ref Range Status   03/17/2025 02:35 PM 6.5 6.1 - 8.1 g/dL Final     ALBUMIN   Date/Time Value Ref Range Status   03/17/2025 02:35 PM 4.6 3.6 - 5.1 g/dL Final     AST   Date/Time Value Ref Range Status   03/17/2025 02:35 PM 13 10 - 35 U/L Final     ALT   Date/Time Value Ref Range Status   03/17/2025 02:35 PM 10 6 - 29 U/L Final     ALKALINE PHOSPHATASE   Date/Time Value Ref Range Status   03/17/2025 02:35 PM 66 37 - 153 U/L Final     BILIRUBIN, TOTAL   Date/Time Value Ref Range Status   03/17/2025 02:35 PM 0.4 0.2 - 1.2 mg/dL Final     BILIRUBIN, DIRECT   Date/Time Value Ref Range Status   03/17/2025 02:28 PM 0.1 < OR = 0.2 mg/dL Final        VITAMIN D,25-OH,TOTAL,IA   Date/Time Value Ref Range Status   03/17/2025 02:35 PM 79 30 - 100 ng/mL Final     Comment:     Vitamin D Status         25-OH  Vitamin D:     Deficiency:                    <20 ng/mL  Insufficiency:             20 - 29 ng/mL  Optimal:                 > or = 30 ng/mL     For 25-OH Vitamin D testing on patients on   D2-supplementation and patients for whom quantitation   of D2 and D3 fractions is required, the QuestAssureD(TM)  25-OH VIT D, (D2,D3), LC/MS/MS is recommended: order   code 82167 (patients >2yrs).     See Note 1     Note 1     For additional information, please refer to   http://education.Intacct/faq/DWX976   (This link is being provided for informational/  educational purposes only.)          AST   Date/Time Value Ref Range Status   03/17/2025 02:35 PM 13 10 - 35 U/L Final     ALT   Date/Time Value Ref Range Status   03/17/2025 02:35 PM 10 6 - 29 U/L Final     ALKALINE PHOSPHATASE   Date/Time Value Ref Range Status   03/17/2025 02:35 PM 66 37 - 153 U/L Final     BILIRUBIN, TOTAL   Date/Time Value Ref Range Status   03/17/2025 02:35 PM 0.4 0.2 - 1.2 mg/dL Final     BILIRUBIN, DIRECT   Date/Time Value Ref Range Status   03/17/2025 02:28 PM 0.1 < OR = 0.2 mg/dL Final     ALBUMIN   Date/Time Value Ref Range Status   03/17/2025 02:35 PM 4.6 3.6 - 5.1 g/dL Final     PROTEIN, TOTAL   Date/Time Value Ref Range Status   03/17/2025 02:35 PM 6.5 6.1 - 8.1 g/dL Final        Protime   Date/Time Value Ref Range Status   06/19/2023 02:43 PM 12.2 9.8 - 13.4 sec Final     INR   Date/Time Value Ref Range Status   06/19/2023 02:43 PM 1.1 0.9 - 1.1 Final       Assessment/Plan   Kristina Lundberg is a 74 y.o. female who presents to Liver clinic for a follow up appointment.    Impression:  Chronic HCV infection - GT1b, low LSM/fibroscan score, completed therapy with Epclusa x 12 weeks with a sustained response. This is consistent with cure from her HCV infection.     HB Core Ab + - on suppressive antiviral therapy to prevent HBV reactivation while on immunosuppressive therapy with Rituximab.  She is concerned about the cost for continuing  Entecavir. It has become more expensive and poorly covered by her insurance plan.      Plan:  I do recommend that she continues suppressive therapy as prophylaxis while on Rituximab.   I have asked her to look into the cost of the following alternative:  Lamivudine 100 mg daily.      Magnus Kim MD     Instructions      Magnus Kim MD               [1]   Allergies  Allergen Reactions    Ibuprofen Hives, Other and Swelling     Facial swelling, lips swelling, hands swelling    Lisinopril Cough and Unknown    Pregabalin Unknown

## 2025-05-14 NOTE — PATIENT INSTRUCTIONS
Welcome to Dr. Magnus Kim's [facility].  Dr. Kim sees patients at the following sites:  [place] [address] [facility] at [facility]  [address], [address] at [facility], [facility] [address]    Dr. Kim's hepatology care coordinator, Heather GARCIA, can be reached at [phone].  Dr. Kim's , Donya Dozier, can be reached at [phone].    I continue to recommend Hepatitis B treatment.  We discussed the increased cost.  Please look into the following medication: Lamivudine 100 mg daily.    Please call the office to let us know what you want to do and to schedule follow up.

## 2025-05-16 ENCOUNTER — SPECIALTY PHARMACY (OUTPATIENT)
Dept: PHARMACY | Facility: CLINIC | Age: 75
End: 2025-05-16

## 2025-05-20 ENCOUNTER — LAB (OUTPATIENT)
Dept: LAB | Facility: CLINIC | Age: 75
End: 2025-05-20
Payer: MEDICARE

## 2025-05-20 DIAGNOSIS — E88.09 OTHER DISORDERS OF PLASMA-PROTEIN METABOLISM, NOT ELSEWHERE CLASSIFIED: ICD-10-CM

## 2025-05-20 DIAGNOSIS — C50.119 MALIGNANT NEOPLASM OF CENTRAL PORTION OF FEMALE BREAST: ICD-10-CM

## 2025-05-20 DIAGNOSIS — E04.1 THYROID NODULE: ICD-10-CM

## 2025-05-20 DIAGNOSIS — Z79.620 ON RITUXIMAB THERAPY: Primary | ICD-10-CM

## 2025-05-20 DIAGNOSIS — G36.0 NEUROMYELITIS OPTICA SPECTRUM DISORDER (MULTI): ICD-10-CM

## 2025-05-20 LAB
BASOPHILS # BLD AUTO: 0.04 X10*3/UL (ref 0–0.1)
BASOPHILS NFR BLD AUTO: 0.8 %
EOSINOPHIL # BLD AUTO: 0.1 X10*3/UL (ref 0–0.4)
EOSINOPHIL NFR BLD AUTO: 2.1 %
ERYTHROCYTE [DISTWIDTH] IN BLOOD BY AUTOMATED COUNT: 12.3 % (ref 11.5–14.5)
HCT VFR BLD AUTO: 48.1 % (ref 36–46)
HGB BLD-MCNC: 15.7 G/DL (ref 12–16)
IMM GRANULOCYTES # BLD AUTO: 0.02 X10*3/UL (ref 0–0.5)
IMM GRANULOCYTES NFR BLD AUTO: 0.4 % (ref 0–0.9)
LYMPHOCYTES # BLD AUTO: 1.15 X10*3/UL (ref 0.8–3)
LYMPHOCYTES NFR BLD AUTO: 24.2 %
MCH RBC QN AUTO: 30.9 PG (ref 26–34)
MCHC RBC AUTO-ENTMCNC: 32.6 G/DL (ref 32–36)
MCV RBC AUTO: 95 FL (ref 80–100)
MONOCYTES # BLD AUTO: 0.48 X10*3/UL (ref 0.05–0.8)
MONOCYTES NFR BLD AUTO: 10.1 %
NEUTROPHILS # BLD AUTO: 2.96 X10*3/UL (ref 1.6–5.5)
NEUTROPHILS NFR BLD AUTO: 62.4 %
NRBC BLD-RTO: 0 /100 WBCS (ref 0–0)
PLATELET # BLD AUTO: 221 X10*3/UL (ref 150–450)
RBC # BLD AUTO: 5.08 X10*6/UL (ref 4–5.2)
WBC # BLD AUTO: 4.8 X10*3/UL (ref 4.4–11.3)

## 2025-05-20 PROCEDURE — 82784 ASSAY IGA/IGD/IGG/IGM EACH: CPT | Mod: PARLAB

## 2025-05-20 PROCEDURE — 36415 COLL VENOUS BLD VENIPUNCTURE: CPT

## 2025-05-20 PROCEDURE — 85025 COMPLETE CBC W/AUTO DIFF WBC: CPT

## 2025-05-20 PROCEDURE — 88185 FLOWCYTOMETRY/TC ADD-ON: CPT | Mod: PARLAB

## 2025-05-20 PROCEDURE — 88187 FLOWCYTOMETRY/READ 2-8: CPT | Performed by: PATHOLOGY

## 2025-05-21 LAB
IGA SERPL-MCNC: 59 MG/DL (ref 70–400)
IGG SERPL-MCNC: 656 MG/DL (ref 700–1600)
IGM SERPL-MCNC: 7 MG/DL (ref 40–230)

## 2025-05-22 ENCOUNTER — INFUSION (OUTPATIENT)
Dept: HEMATOLOGY/ONCOLOGY | Facility: CLINIC | Age: 75
End: 2025-05-22
Payer: MEDICARE

## 2025-05-22 VITALS
BODY MASS INDEX: 29.93 KG/M2 | TEMPERATURE: 97.7 F | DIASTOLIC BLOOD PRESSURE: 71 MMHG | HEART RATE: 86 BPM | WEIGHT: 174.38 LBS | OXYGEN SATURATION: 95 % | SYSTOLIC BLOOD PRESSURE: 109 MMHG | RESPIRATION RATE: 18 BRPM

## 2025-05-22 DIAGNOSIS — Z79.899 DRUG-INDUCED IMMUNODEFICIENCY (MULTI): ICD-10-CM

## 2025-05-22 DIAGNOSIS — G36.0 NEUROMYELITIS OPTICA (MULTI): ICD-10-CM

## 2025-05-22 DIAGNOSIS — D84.821 DRUG-INDUCED IMMUNODEFICIENCY (MULTI): ICD-10-CM

## 2025-05-22 LAB
CD19 CELLS # BLD: <0.002 X10E9/L (ref 0.07–0.91)
CD19 CELLS NFR BLD: <0.1 % (ref 6–19)
FLOW CYTOMETRY SPECIALIST REVIEW: ABNORMAL
LYMPHOCYTES # SPEC AUTO: 1.51 X10*3/UL
PATH REVIEW, B CELL PHENOTYPING, EXTENDED: ABNORMAL

## 2025-05-22 PROCEDURE — 96413 CHEMO IV INFUSION 1 HR: CPT

## 2025-05-22 PROCEDURE — 2500000001 HC RX 250 WO HCPCS SELF ADMINISTERED DRUGS (ALT 637 FOR MEDICARE OP): Performed by: PSYCHIATRY & NEUROLOGY

## 2025-05-22 PROCEDURE — 2500000004 HC RX 250 GENERAL PHARMACY W/ HCPCS (ALT 636 FOR OP/ED): Mod: JZ | Performed by: PSYCHIATRY & NEUROLOGY

## 2025-05-22 PROCEDURE — 96415 CHEMO IV INFUSION ADDL HR: CPT

## 2025-05-22 PROCEDURE — 96375 TX/PRO/DX INJ NEW DRUG ADDON: CPT | Mod: INF

## 2025-05-22 RX ORDER — DIPHENHYDRAMINE HYDROCHLORIDE 50 MG/ML
25 INJECTION, SOLUTION INTRAMUSCULAR; INTRAVENOUS ONCE
OUTPATIENT
Start: 2025-11-15 | End: 2025-11-15

## 2025-05-22 RX ORDER — DIPHENHYDRAMINE HYDROCHLORIDE 50 MG/ML
50 INJECTION, SOLUTION INTRAMUSCULAR; INTRAVENOUS AS NEEDED
OUTPATIENT
Start: 2025-11-15

## 2025-05-22 RX ORDER — ALBUTEROL SULFATE 0.83 MG/ML
3 SOLUTION RESPIRATORY (INHALATION) AS NEEDED
OUTPATIENT
Start: 2025-11-15

## 2025-05-22 RX ORDER — ACETAMINOPHEN 325 MG/1
650 TABLET ORAL ONCE
OUTPATIENT
Start: 2025-11-15 | End: 2025-11-15

## 2025-05-22 RX ORDER — ACETAMINOPHEN 325 MG/1
650 TABLET ORAL ONCE
Status: COMPLETED | OUTPATIENT
Start: 2025-05-22 | End: 2025-05-22

## 2025-05-22 RX ORDER — FAMOTIDINE 10 MG/ML
20 INJECTION, SOLUTION INTRAVENOUS ONCE AS NEEDED
OUTPATIENT
Start: 2025-11-15

## 2025-05-22 RX ORDER — EPINEPHRINE 0.3 MG/.3ML
0.3 INJECTION SUBCUTANEOUS EVERY 5 MIN PRN
OUTPATIENT
Start: 2025-11-15

## 2025-05-22 RX ORDER — DIPHENHYDRAMINE HYDROCHLORIDE 50 MG/ML
25 INJECTION, SOLUTION INTRAMUSCULAR; INTRAVENOUS ONCE
Status: COMPLETED | OUTPATIENT
Start: 2025-05-22 | End: 2025-05-22

## 2025-05-22 RX ADMIN — ACETAMINOPHEN 650 MG: 325 TABLET, FILM COATED ORAL at 09:57

## 2025-05-22 RX ADMIN — DIPHENHYDRAMINE HYDROCHLORIDE 25 MG: 50 INJECTION, SOLUTION INTRAMUSCULAR; INTRAVENOUS at 09:59

## 2025-05-22 RX ADMIN — METHYLPREDNISOLONE SODIUM SUCCINATE 100 MG: 125 INJECTION, POWDER, FOR SOLUTION INTRAMUSCULAR; INTRAVENOUS at 10:02

## 2025-05-22 RX ADMIN — RITUXIMAB 1000 MG: 10 INJECTION, SOLUTION INTRAVENOUS at 10:13

## 2025-05-22 ASSESSMENT — PAIN SCALES - GENERAL: PAINLEVEL_OUTOF10: 0-NO PAIN

## 2025-05-22 NOTE — SIGNIFICANT EVENT
05/22/25 0949   Prechemo Checklist   Has the patient been in the hospital, ED, or urgent care since last date of service Yes  (treated early in the year for pneumonia. No concerns at present)   Chemo/Immuno Consent Completed and Signed Not applicable   Protocol/Indications Verified Yes   Confirmed to previous date/time of medication Yes   Compared to previous dose Yes   All medications are dated accurately Yes   Pregnancy Test Negative Not applicable   Parameters Met Yes

## 2025-05-30 PROCEDURE — RXMED WILLOW AMBULATORY MEDICATION CHARGE

## 2025-06-02 ENCOUNTER — SPECIALTY PHARMACY (OUTPATIENT)
Dept: PHARMACY | Facility: CLINIC | Age: 75
End: 2025-06-02

## 2025-06-03 ENCOUNTER — PHARMACY VISIT (OUTPATIENT)
Dept: PHARMACY | Facility: CLINIC | Age: 75
End: 2025-06-03
Payer: COMMERCIAL

## 2025-06-20 ENCOUNTER — SPECIALTY PHARMACY (OUTPATIENT)
Dept: PHARMACY | Facility: HOSPITAL | Age: 75
End: 2025-06-20
Payer: MEDICARE

## 2025-06-20 DIAGNOSIS — R76.8 HEPATITIS B CORE ANTIBODY POSITIVE: ICD-10-CM

## 2025-06-20 RX ORDER — ENTECAVIR 0.5 MG/1
TABLET, FILM COATED ORAL
Qty: 90 TABLET | Refills: 3 | Status: CANCELLED | OUTPATIENT
Start: 2025-06-20 | End: 2026-06-20

## 2025-06-20 NOTE — PROGRESS NOTES
[facility] Clinical Note  Patient Reassessment     Introduction  Kristina Lundberg is a 74 y.o. female who is on the specialty pharmacy service for management of: Hepatology Core.      [facility] supplied medication: entecavir 0.5 mg po daily    Duration of therapy: Maintenance    The most recent encounter visit with the referring prescriber Dr. Kim on 5/14/25 was reviewed.  Pharmacy will continue to collaborate in the care of this patient with the referring prescriber.    Discussion  Kristina was contacted on 6/20/2025 at 12:33 PM for a pharmacy visit with encounter number [id number] from:   [facility]  [address]  [facility] [address]  [facility] [address]  Dept: [phone]  Dept Fax: [phone]  Loc: [phone]  Kristina consented to a/an Telephone visit, which was performed.    Efficacy  Patient has developed new symptoms of condition: No  Patient/caregiver feels medication is affecting the disease state: HBV sAg nonreactive    Goals  Provided education on goals and possible outcomes of therapy:  Adherence with therapy  Timely completion of appropriate labs  Timely and appropriate follow up with provider  Identify and address medication interactions with presciption medications, OTC medications and supplements  Optimize or maintain quality of life  Hepatology: Prevention of long-term negative clinical outcomes (e.g. cirrhosis, liver transplantation, HCC, death) by durable suppression of HBV DNA   Prevention and suppression of HBV DNA replication  Patient has documented target(s) for goals of therapy: No    Tolerance  Patient has experienced side effects from this medication: Yes - patient states she had pneumonia a few months ago and experienced loss of hair  Changes to current therapy regimen: No    The follow-up timeline was discussed. Every person responds to and reacts to therapy differently. Patient should be assessed for efficacy and  tolerability in approximately: 6 months    Adherence  Patient Information  Informant: Self (Patient)  Demonstrates Understanding of Importance of Adherence: Yes  Does the patient have any barriers to self-administration (including physical and mental?): No  Barriers to Self-Administration: patient is geriatric  Action Taken to Mitigate Barriers for Self-Administration: patient appears to be adherent to treatment  Medication Information  Medication: entecavir (Baraclude)  Patient Reported Missed Doses in the Last 4 Weeks: 0  Estimated Medication Adherence Level: %  Barriers to Adherence: Financial  Action Taken to Address Barriers to Adherence: patient's out of pocket cost increased significantly recently; reached out to [employer] to see if we could try to offer it for less.   The importance of adherence was discussed and patient/caregiver was advised to take the medication as prescribed by their provider. Encouraged patient/caregiver to call physician's office or specialty pharmacy if they have a question regarding a missed dose.    General Assessment  Changes to home medications, OTCs or supplements: No  Current Medications[1]  Reported new allergies: No  Reported new medical conditions: No  Additional monitoring reviewed: Hepatology - Hepatitis B Virus Labs:   Lab Results   Component Value Date    ALT 10 03/17/2025    AST 13 03/17/2025    BILIDIR 0.1 03/17/2025    HBVDNAPCRIUM NOT DETECTED 03/17/2025    HEPBSAG NON-REACTIVE 03/17/2025    CREATININE 0.52 (L) 03/17/2025    GFRF >90 06/19/2023   CrCl> 100 mL/min  Is laboratory follow up needed? No    Advised to contact the pharmacy if there are any changes to the patient's medication list, including prescriptions, OTC medications, herbal products, or supplements.    Impression/Plan  This patient has been identified as high risk due to Geriatric (over 65 years of age).  The following action was taken: Patient/caregiver encouraged to participate in patient  management program.    QOL/Patient Satisfaction  Rate your quality of life on scale of 1-10: 5  Rate your satisfaction with  Specialty Pharmacy on scale of 1-10: 10 - Completely satisfied    Provided contact information ([phone]) for [facility] and reviewed dispensing process, refill timeline and patient management follow up. Confirmed understanding of education conducted during assessment. All questions and concerns were addressed and patient/caregiver was encouraged to reach out for additional questions or concerns.    Based on the patient's diagnosis, medication list, progress towards goals, adherence, tolerance, and medication list, medication remains appropriate: Therapy remains appropriate with modifications or additional outreach; provider outreach/MTP documented  Patient is having affordability issues; sent a message to [employer] to ask about the cash price and created a follow up task    Bhavani Pond, RebekahD       [1]   Current Outpatient Medications   Medication Sig Dispense Refill    albuterol 2.5 mg /3 mL (0.083 %) nebulizer solution Take 3 mL (2.5 mg) by nebulization 4 times a day as needed for wheezing or shortness of breath.      albuterol 90 mcg/actuation inhaler Inhale 2 puffs every 6 hours if needed for shortness of breath. 18 g 3    artificial tears, dextran-hypomel-glycerin, 0.1-0.3-0.2 % ophthalmic solution Administer 2 drops into the left eye every 4 hours.      aspirin 325 mg tablet Take 1 tablet (325 mg) by mouth once daily as needed for headaches or moderate pain (4 - 6).      buPROPion (Wellbutrin) 75 mg tablet Take 1 tablet (75 mg) by mouth early in the morning.. (Patient not taking: Reported on 5/14/2025) 30 tablet 11    calcium carb/vitamin D3/vit K1 (VIACTIV ORAL) Take by mouth once daily.      cholecalciferol (Vitamin D-3) 1.25 mg (50,000 units) capsule Take 1 capsule (1.25 mg) by mouth 1 (one) time per week. 12 capsule 3    entecavir (Baraclude) 0.5  mg tablet TAKE ONE (1) TABLET BY MOUTH DAILY 1 OR 2 HOURS BEFORE A MEAL. 90 tablet 3    fluconazole (Diflucan) 150 mg tablet Take 1 tablet (150 mg) by mouth early in the morning.. (Patient not taking: Reported on 5/14/2025)      riTUXimab (Rituxan) 10 mg/mL injection Infuse 100 mL (1,000 mg total) into a venous catheter every 6 months.      Synthroid 100 mcg tablet Take 1 tablet (100 mcg) by mouth early in the morning.. 90 tablet 3    valsartan (Diovan) 40 mg tablet Take 0.5 tablets (20 mg) by mouth once daily. 45 tablet 3     No current facility-administered medications for this visit.

## 2025-06-20 NOTE — TELEPHONE ENCOUNTER
Patient called asking for a 90-day prescription for the medication.    Entecavir 5.0 mg tablet     She said her [place] pharmacy is able to fill prescription.

## 2025-06-23 ENCOUNTER — SPECIALTY PHARMACY (OUTPATIENT)
Dept: PHARMACY | Facility: CLINIC | Age: 75
End: 2025-06-23

## 2025-06-23 DIAGNOSIS — R76.8 HEPATITIS B CORE ANTIBODY POSITIVE: ICD-10-CM

## 2025-06-23 PROCEDURE — RXMED WILLOW AMBULATORY MEDICATION CHARGE

## 2025-06-23 RX ORDER — ENTECAVIR 0.5 MG/1
TABLET, FILM COATED ORAL
Qty: 90 TABLET | Refills: 3 | Status: SHIPPED | OUTPATIENT
Start: 2025-06-23 | End: 2026-06-23

## 2025-06-23 RX ORDER — ENTECAVIR 0.5 MG/1
TABLET, FILM COATED ORAL
Qty: 90 TABLET | Refills: 3 | Status: SHIPPED | OUTPATIENT
Start: 2025-06-23 | End: 2025-06-23 | Stop reason: SDUPTHER

## 2025-06-23 NOTE — PROGRESS NOTES
Per Alethea:  If patient wanted to pay for entecavir out of pocket, her cost would be $86.43 for a 90 days supply. This would include shipping direct to patient.    Spoke with patient and she would like to continue filling with [facility]. Will clarify the duration of the discount.

## 2025-06-23 NOTE — PROGRESS NOTES
This patient has scheduled their medication delivery with  Specialty Pharmacy.  They should receive their medication 6/27/2025.

## 2025-06-23 NOTE — TELEPHONE ENCOUNTER
Hepatology Nurse Coordinator Note  Spoke with pharmacy to clarify where she wanted her Entecavir sent to. She states she did want it sent to [facility]. She confirmed the [place] location. She is requesting a 90 day supply. She's aware I will forward request to Dr. Kim for approval. Patient verbalized understanding.     Upon sending request, chart review shows  Specialty Pharmacy is processing a new prescription. Will notify  Specialty patient wanted to have filled at [facility].

## 2025-06-24 NOTE — TELEPHONE ENCOUNTER
Hepatology Nurse Coordinator Note   Received update from Bhavani that they were able to price match patient's Entecavir prescription to [facility] pricing. She also updated that patient was called. Called patient to see if any other questions, or concerns. No answer. Left a voicemail message requesting a call back.

## 2025-06-25 NOTE — TELEPHONE ENCOUNTER
Hepatology Nurse Coordinator Note   Spoke with patient. She confirmed she is getting her Entecavir through  Specialty Pharmacy and they were able to work out pricing. Per Bhavani, it is scheduled to be delivered 6/27/25. Patient denied any additional needs at this time.

## 2025-06-26 ENCOUNTER — PHARMACY VISIT (OUTPATIENT)
Dept: PHARMACY | Facility: CLINIC | Age: 75
End: 2025-06-26
Payer: COMMERCIAL

## 2025-07-10 ENCOUNTER — APPOINTMENT (OUTPATIENT)
Dept: PRIMARY CARE | Facility: CLINIC | Age: 75
End: 2025-07-10
Payer: MEDICARE

## 2025-07-10 VITALS
DIASTOLIC BLOOD PRESSURE: 80 MMHG | HEART RATE: 76 BPM | SYSTOLIC BLOOD PRESSURE: 138 MMHG | TEMPERATURE: 98.2 F | HEIGHT: 64 IN | WEIGHT: 182 LBS | BODY MASS INDEX: 31.07 KG/M2

## 2025-07-10 DIAGNOSIS — G36.0 NEUROMYELITIS OPTICA (MULTI): ICD-10-CM

## 2025-07-10 DIAGNOSIS — E03.9 HYPOTHYROIDISM, UNSPECIFIED TYPE: ICD-10-CM

## 2025-07-10 DIAGNOSIS — I10 PRIMARY HYPERTENSION: ICD-10-CM

## 2025-07-10 DIAGNOSIS — E04.1 THYROID NODULE: Primary | ICD-10-CM

## 2025-07-10 DIAGNOSIS — R49.9 HOARSENESS OR CHANGING VOICE: ICD-10-CM

## 2025-07-10 PROBLEM — Z86.000 HISTORY OF DUCTAL CARCINOMA IN SITU (DCIS) OF BREAST: Status: ACTIVE | Noted: 2025-07-10

## 2025-07-10 PROBLEM — U07.1 DISEASE DUE TO SEVERE ACUTE RESPIRATORY SYNDROME CORONAVIRUS 2 (SARS-COV-2): Status: RESOLVED | Noted: 2024-09-19 | Resolved: 2025-07-10

## 2025-07-10 PROBLEM — L60.9 DISEASE OF NAIL: Status: RESOLVED | Noted: 2024-09-19 | Resolved: 2025-07-10

## 2025-07-10 PROBLEM — R05.9 COUGH: Status: RESOLVED | Noted: 2024-11-07 | Resolved: 2025-07-10

## 2025-07-10 PROBLEM — K21.9 LARYNGOPHARYNGEAL REFLUX: Status: ACTIVE | Noted: 2024-11-07

## 2025-07-10 PROBLEM — R09.89 PHLEGM IN THROAT: Status: ACTIVE | Noted: 2024-11-07

## 2025-07-10 PROBLEM — E87.6 HYPOKALEMIA: Status: RESOLVED | Noted: 2024-12-13 | Resolved: 2025-07-10

## 2025-07-10 PROBLEM — H11.121 CONJUNCTIVAL CONCRETIONS OF RIGHT EYE: Status: RESOLVED | Noted: 2023-08-19 | Resolved: 2025-07-10

## 2025-07-10 PROBLEM — R11.0 NAUSEA: Status: RESOLVED | Noted: 2024-09-19 | Resolved: 2025-07-10

## 2025-07-10 PROBLEM — D70.9 NEUTROPENIA: Status: ACTIVE | Noted: 2024-12-13

## 2025-07-10 PROBLEM — J96.01 ACUTE RESPIRATORY FAILURE WITH HYPOXIA: Status: RESOLVED | Noted: 2024-12-14 | Resolved: 2025-07-10

## 2025-07-10 PROBLEM — J96.01 ACUTE RESPIRATORY FAILURE WITH HYPOXIA: Status: ACTIVE | Noted: 2024-12-14

## 2025-07-10 PROBLEM — H66.90 OTITIS MEDIA: Status: RESOLVED | Noted: 2024-09-19 | Resolved: 2025-07-10

## 2025-07-10 PROBLEM — E87.6 HYPOKALEMIA: Status: ACTIVE | Noted: 2024-12-13

## 2025-07-10 PROBLEM — R09.82 POST-NASAL DRIP: Status: ACTIVE | Noted: 2024-11-07

## 2025-07-10 PROBLEM — J18.9 PNEUMONIA: Status: RESOLVED | Noted: 2024-09-19 | Resolved: 2025-07-10

## 2025-07-10 PROBLEM — R09.82 POST-NASAL DRIP: Status: RESOLVED | Noted: 2024-11-07 | Resolved: 2025-07-10

## 2025-07-10 PROBLEM — R05.9 COUGH: Status: ACTIVE | Noted: 2024-11-07

## 2025-07-10 PROBLEM — N20.0 KIDNEY STONES: Status: ACTIVE | Noted: 2025-07-10

## 2025-07-10 PROBLEM — R09.89 PHLEGM IN THROAT: Status: RESOLVED | Noted: 2024-11-07 | Resolved: 2025-07-10

## 2025-07-10 PROBLEM — L30.9 DERMATITIS OF FACE: Status: RESOLVED | Noted: 2023-08-19 | Resolved: 2025-07-10

## 2025-07-10 PROCEDURE — 3075F SYST BP GE 130 - 139MM HG: CPT | Performed by: FAMILY MEDICINE

## 2025-07-10 PROCEDURE — 99214 OFFICE O/P EST MOD 30 MIN: CPT | Performed by: FAMILY MEDICINE

## 2025-07-10 PROCEDURE — 1159F MED LIST DOCD IN RCRD: CPT | Performed by: FAMILY MEDICINE

## 2025-07-10 PROCEDURE — 3079F DIAST BP 80-89 MM HG: CPT | Performed by: FAMILY MEDICINE

## 2025-07-10 PROCEDURE — 1036F TOBACCO NON-USER: CPT | Performed by: FAMILY MEDICINE

## 2025-07-10 PROCEDURE — 3008F BODY MASS INDEX DOCD: CPT | Performed by: FAMILY MEDICINE

## 2025-07-10 RX ORDER — ERGOCALCIFEROL 1.25 MG/1
50000 CAPSULE ORAL
COMMUNITY
Start: 2024-12-13

## 2025-07-10 ASSESSMENT — PATIENT HEALTH QUESTIONNAIRE - PHQ9
1. LITTLE INTEREST OR PLEASURE IN DOING THINGS: NOT AT ALL
2. FEELING DOWN, DEPRESSED OR HOPELESS: NOT AT ALL
SUM OF ALL RESPONSES TO PHQ9 QUESTIONS 1 AND 2: 0

## 2025-07-10 NOTE — PROGRESS NOTES
"    /80   Pulse 76   Temp 36.8 °C (98.2 °F)   Ht 1.626 m (5' 4\")   Wt 82.6 kg (182 lb)   BMI 31.24 kg/m²     Medical History[1]    Problem List[2]    Current Medications[3]    CC/HPI/ASSESSMENT/PLAN    CC follow-up medication, medical illnesses    HPI patient 74-year-old female with a history of hypertension hypothyroidism, neuromyelitis optica, transverse myelitis.  She has a history of thyroid nodules.  It has been a year since her last ultrasound.  We discussed repeating the ultrasound again and if normal we will not need any further thyroid ultrasounds.  Patient monitoring blood pressure closely at home and has been under good control.  Patient notes she is experiencing a hoarseness with some scratchiness in her voice.  She also notes a small bump on your left ear.  Denies fever chills chest pain abdominal pain blood in urine or stool.  Medications reviewed.  Patient taking immunosuppressants for her neuromyelitis optica    Exam calm neck supple lungs CTA CV RRR Ext no edema skin no rash examination of the face reveals small palpable nodule pea-sized front of the left ear    A/P 1 thyroid nodule.  Ultrasound is ordered.  If ultrasound shows no change from last year no further ultrasounds will be ordered.  2 hypertension stable continue to monitor closely at home.  #3 hypothyroidism chronic stable continue Synthroid.  #4 neuromyelitis optica.  Continue immunosuppressant therapies.  #5 hoarseness, change in voice.  Recommend see ENT in consultation for proper evaluation.  Will monitor the lymph node noted above near the left ear.  Follow-up 3 months I spent in excess of 30 minutes with patient more than 50% of that time spent in counseling discussion with regards to medical conditions and treatment plans    There are no diagnoses linked to this encounter.          [1]   Past Medical History:  Diagnosis Date    Acute atopic conjunctivitis, bilateral 05/09/2018    Allergic conjunctivitis of both eyes    " Acute transverse myelitis in demyelinating disease of central nervous system 11/20/2017    Transverse myelitis    Age-related nuclear cataract, right eye 12/20/2022    Age-related nuclear cataract of right eye    Breast cancer 2021    Left    Chronic viral hepatitis C (Multi) 02/23/2018    Chronic hepatitis C without hepatic coma    Chronic viral hepatitis C (Multi) 04/20/2018    Chronic hepatitis C without hepatic coma    Chronic viral hepatitis C (Multi) 05/25/2018    Chronic hepatitis C without hepatic coma    Chronic viral hepatitis C (Multi) 07/17/2018    Chronic hepatitis C without hepatic coma    Congenital cataract     Congenital cataract    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    Neuromyelitis optica    Ocular pain, unspecified eye 03/10/2014    Eye pain    Other abnormal and inconclusive findings on diagnostic imaging of breast 11/03/2020    Abnormal finding on breast imaging    Other anomalies of pupillary function 06/21/2019    Relative afferent pupillary defect of right eye    Other microscopic hematuria     Microscopic hematuria    Other specified abnormal findings of blood chemistry     Abnormal liver function test    Other specified abnormal findings of blood chemistry 07/17/2018    Abnormal liver function test    Other specified abnormal immunological findings in serum 02/23/2018    Hepatitis B core antibody positive    Other specified abnormal immunological findings in serum 04/20/2018    Hepatitis B core antibody positive    Other specified abnormal immunological findings in serum 05/25/2018    Hepatitis B core antibody positive    Other specified abnormal immunological findings in serum 07/17/2018    Hepatitis B core antibody positive    Other subjective visual disturbances 12/20/2022    Photopsia of right eye    Personal history of irradiation 2021    Left breast    Personal history of other diseases of the circulatory system     History of hypertension    Personal history of other diseases  of the nervous system and sense organs 05/09/2018    History of acute conjunctivitis    Personal history of other diseases of the nervous system and sense organs 12/20/2022    History of acute conjunctivitis    Personal history of other diseases of the nervous system and sense organs 02/22/2013    History of cataract    Personal history of other endocrine, nutritional and metabolic disease     History of Graves' disease    Personal history of other infectious and parasitic diseases 11/13/2018    History of hepatitis C virus infection    Personal history of other specified conditions     History of abdominal pain    Personal history of other specified conditions 12/04/2019    History of headache    Pupillary abnormality, left eye 10/03/2017    Pupillary abnormality of left eye    Rheumatoid arthritis, unspecified     Rheumatoid arthritis    Spondylosis without myelopathy or radiculopathy, cervical region     Cervical spondylosis    Spondylosis without myelopathy or radiculopathy, cervical region 07/03/2014    Cervical spondylosis    Unspecified abdominal pain 04/20/2018    Abdominal pain    Unspecified abdominal pain 05/25/2018    Abdominal pain    Unspecified abdominal pain 07/17/2018    Abdominal pain    Unspecified optic neuritis 12/20/2022    Optic neuritis, right    Vitamin D deficiency, unspecified 07/17/2018    Vitamin D insufficiency   [2]   Patient Active Problem List  Diagnosis    Age-related nuclear cataract of right eye    Benign essential hypertension    Bilateral dry eyes    Bilateral keratitis    Blepharitis of both upper and lower eyelid of left eye    Central scotoma, right eye    Cervical spondylosis    Chronic hepatitis C without hepatic coma (Multi)    Colon polyp    Conjunctival concretions of right eye    Dermatitis of face    Elevated IOP    Exposure to medical therapeutic radiation    Graves disease    Hepatitis B core antibody positive    Hepatitis C virus infection cured after antiviral drug  therapy    Hip pain    Neuromyelitis optica (Multi)    On rituximab therapy    Low back pain    Photopsia of right eye    PVD (posterior vitreous detachment), right eye    Vitamin D deficiency    Hypothyroidism, unspecified    Steroid responder, bilateral    Status post bilateral breast reduction    History of adenomatous polyp of colon    Drug-induced immunodeficiency (Multi)    Acute disseminated demyelination, unspecified    Telogen effluvium    Transverse myelitis (Multi)    Discomfort of right ear    Class 1 obesity due to excess calories with serious comorbidity and body mass index (BMI) of 32.0 to 32.9 in adult    Incomplete uterovaginal prolapse    Bowel dysfunction    Cystocele, midline    Muscle weakness    OAB (overactive bladder)    Rectocele    Urge urinary incontinence    Urinary frequency    Urinary urgency    Thyroid nodule    Astigmatism of both eyes with presbyopia    Weakness    Shortness of breath at rest    Rheumatoid arthritis    Relative afferent pupillary defect of right eye    Pneumonia    Otitis media    Nausea    Microscopic hematuria    Malignant neoplasm of central portion of female breast    History of hypertension    History of Graves' disease    Headache    Female genital prolapse    Disease of nail    Disease due to severe acute respiratory syndrome coronavirus 2 (SARS-CoV-2)    Congenital cataract    Chest discomfort    Bronchospasm    Bladder outlet obstruction    Bilateral hydronephrosis    Acute conjunctivitis    Allergic rhinitis    Acute respiratory failure with hypoxia    History of ductal carcinoma in situ (DCIS) of breast    Hypertension    Hypokalemia    Kidney stones    Laryngopharyngeal reflux    Neutropenia    Phlegm in throat    Post-nasal drip    Cough   [3]   Current Outpatient Medications   Medication Sig Dispense Refill    albuterol 90 mcg/actuation inhaler Inhale 2 puffs every 6 hours if needed for shortness of breath. 18 g 3    artificial tears,  dextran-hypomel-glycerin, 0.1-0.3-0.2 % ophthalmic solution Administer 2 drops into the left eye every 4 hours.      aspirin 325 mg tablet Take 1 tablet (325 mg) by mouth once daily as needed for headaches or moderate pain (4 - 6).      calcium carb/vitamin D3/vit K1 (VIACTIV ORAL) Take by mouth once daily.      cholecalciferol (Vitamin D-3) 1.25 mg (50,000 units) capsule Take 1 capsule (1.25 mg) by mouth 1 (one) time per week. 12 capsule 3    entecavir (Baraclude) 0.5 mg tablet TAKE ONE (1) TABLET BY MOUTH DAILY 1 OR 2 HOURS BEFORE A MEAL. 90 tablet 3    ergocalciferol (Vitamin D-2) 1250 mcg (50,000 units) capsule 50,000 Int'l Units.      riTUXimab (Rituxan) 10 mg/mL injection Infuse 100 mL (1,000 mg total) into a venous catheter every 6 months.      Synthroid 100 mcg tablet Take 1 tablet (100 mcg) by mouth early in the morning.. 90 tablet 3    valsartan (Diovan) 40 mg tablet Take 0.5 tablets (20 mg) by mouth once daily. 45 tablet 3    albuterol 2.5 mg /3 mL (0.083 %) nebulizer solution Take 3 mL (2.5 mg) by nebulization 4 times a day as needed for wheezing or shortness of breath.      buPROPion (Wellbutrin) 75 mg tablet Take 1 tablet (75 mg) by mouth early in the morning.. (Patient not taking: Reported on 5/14/2025) 30 tablet 11    fluconazole (Diflucan) 150 mg tablet Take 1 tablet (150 mg) by mouth early in the morning.. (Patient not taking: Reported on 7/10/2025)       No current facility-administered medications for this visit.

## 2025-09-04 DIAGNOSIS — Z00.00 WELL ADULT EXAM: ICD-10-CM

## 2025-09-15 ENCOUNTER — APPOINTMENT (OUTPATIENT)
Dept: NEUROLOGY | Facility: HOSPITAL | Age: 75
End: 2025-09-15
Payer: MEDICARE

## 2025-09-26 ENCOUNTER — APPOINTMENT (OUTPATIENT)
Dept: OPHTHALMOLOGY | Facility: CLINIC | Age: 75
End: 2025-09-26
Payer: MEDICARE

## 2025-09-29 ENCOUNTER — APPOINTMENT (OUTPATIENT)
Dept: NEUROLOGY | Facility: HOSPITAL | Age: 75
End: 2025-09-29
Payer: MEDICARE

## 2025-10-16 ENCOUNTER — APPOINTMENT (OUTPATIENT)
Dept: PRIMARY CARE | Facility: CLINIC | Age: 75
End: 2025-10-16
Payer: MEDICARE